# Patient Record
Sex: MALE | Race: WHITE | Employment: OTHER | URBAN - METROPOLITAN AREA
[De-identification: names, ages, dates, MRNs, and addresses within clinical notes are randomized per-mention and may not be internally consistent; named-entity substitution may affect disease eponyms.]

---

## 2018-03-26 ENCOUNTER — OFFICE VISIT (OUTPATIENT)
Dept: FAMILY MEDICINE CLINIC | Age: 68
End: 2018-03-26
Payer: MEDICARE

## 2018-03-26 ENCOUNTER — HOSPITAL ENCOUNTER (OUTPATIENT)
Age: 68
Discharge: HOME | End: 2018-03-28
Payer: MEDICARE

## 2018-03-26 VITALS
DIASTOLIC BLOOD PRESSURE: 84 MMHG | WEIGHT: 219 LBS | HEART RATE: 100 BPM | BODY MASS INDEX: 35.2 KG/M2 | RESPIRATION RATE: 18 BRPM | OXYGEN SATURATION: 97 % | TEMPERATURE: 97.9 F | HEIGHT: 66 IN | SYSTOLIC BLOOD PRESSURE: 120 MMHG

## 2018-03-26 DIAGNOSIS — Z12.5 PROSTATE CANCER SCREENING: ICD-10-CM

## 2018-03-26 DIAGNOSIS — E78.5 HYPERLIPIDEMIA, UNSPECIFIED HYPERLIPIDEMIA TYPE: ICD-10-CM

## 2018-03-26 DIAGNOSIS — I10 ESSENTIAL HYPERTENSION: ICD-10-CM

## 2018-03-26 DIAGNOSIS — Z12.11 COLON CANCER SCREENING: ICD-10-CM

## 2018-03-26 DIAGNOSIS — E78.5 HYPERLIPIDEMIA, UNSPECIFIED HYPERLIPIDEMIA TYPE: Primary | ICD-10-CM

## 2018-03-26 LAB
ALBUMIN SERPL-MCNC: 3.9 G/DL (ref 3.5–5.2)
ALP BLD-CCNC: 54 U/L (ref 40–129)
ALT SERPL-CCNC: 17 U/L (ref 0–40)
ANION GAP SERPL CALCULATED.3IONS-SCNC: 15 MMOL/L (ref 7–16)
AST SERPL-CCNC: 18 U/L (ref 0–39)
BASOPHILS ABSOLUTE: 0.03 E9/L (ref 0–0.2)
BASOPHILS RELATIVE PERCENT: 0.6 % (ref 0–2)
BILIRUB SERPL-MCNC: 2 MG/DL (ref 0–1.2)
BUN BLDV-MCNC: 20 MG/DL (ref 8–23)
CALCIUM SERPL-MCNC: 9 MG/DL (ref 8.6–10.2)
CHLORIDE BLD-SCNC: 102 MMOL/L (ref 98–107)
CHOLESTEROL, TOTAL: 156 MG/DL (ref 0–199)
CO2: 24 MMOL/L (ref 22–29)
CREAT SERPL-MCNC: 1.1 MG/DL (ref 0.7–1.2)
EOSINOPHILS ABSOLUTE: 0.1 E9/L (ref 0.05–0.5)
EOSINOPHILS RELATIVE PERCENT: 2 % (ref 0–6)
GFR AFRICAN AMERICAN: >60
GFR NON-AFRICAN AMERICAN: >60 ML/MIN/1.73
GLUCOSE BLD-MCNC: 123 MG/DL (ref 74–109)
HCT VFR BLD CALC: 42.5 % (ref 37–54)
HDLC SERPL-MCNC: 52 MG/DL
HEMOGLOBIN: 13.3 G/DL (ref 12.5–16.5)
IMMATURE GRANULOCYTES #: 0.01 E9/L
IMMATURE GRANULOCYTES %: 0.2 % (ref 0–5)
LDL CHOLESTEROL CALCULATED: 81 MG/DL (ref 0–99)
LYMPHOCYTES ABSOLUTE: 1.44 E9/L (ref 1.5–4)
LYMPHOCYTES RELATIVE PERCENT: 29.2 % (ref 20–42)
MCH RBC QN AUTO: 25.8 PG (ref 26–35)
MCHC RBC AUTO-ENTMCNC: 31.3 % (ref 32–34.5)
MCV RBC AUTO: 82.4 FL (ref 80–99.9)
MONOCYTES ABSOLUTE: 0.4 E9/L (ref 0.1–0.95)
MONOCYTES RELATIVE PERCENT: 8.1 % (ref 2–12)
NEUTROPHILS ABSOLUTE: 2.95 E9/L (ref 1.8–7.3)
NEUTROPHILS RELATIVE PERCENT: 59.9 % (ref 43–80)
PDW BLD-RTO: 14.3 FL (ref 11.5–15)
PLATELET # BLD: 185 E9/L (ref 130–450)
PMV BLD AUTO: 10.8 FL (ref 7–12)
POTASSIUM SERPL-SCNC: 4.5 MMOL/L (ref 3.5–5)
PROSTATE SPECIFIC ANTIGEN: 1.58 NG/ML (ref 0–4)
RBC # BLD: 5.16 E12/L (ref 3.8–5.8)
SODIUM BLD-SCNC: 141 MMOL/L (ref 132–146)
TOTAL PROTEIN: 6.8 G/DL (ref 6.4–8.3)
TRIGL SERPL-MCNC: 115 MG/DL (ref 0–149)
VLDLC SERPL CALC-MCNC: 23 MG/DL
WBC # BLD: 4.9 E9/L (ref 4.5–11.5)

## 2018-03-26 PROCEDURE — 85025 COMPLETE CBC W/AUTO DIFF WBC: CPT

## 2018-03-26 PROCEDURE — 36415 COLL VENOUS BLD VENIPUNCTURE: CPT | Performed by: FAMILY MEDICINE

## 2018-03-26 PROCEDURE — G8417 CALC BMI ABV UP PARAM F/U: HCPCS | Performed by: FAMILY MEDICINE

## 2018-03-26 PROCEDURE — G8427 DOCREV CUR MEDS BY ELIG CLIN: HCPCS | Performed by: FAMILY MEDICINE

## 2018-03-26 PROCEDURE — 4040F PNEUMOC VAC/ADMIN/RCVD: CPT | Performed by: FAMILY MEDICINE

## 2018-03-26 PROCEDURE — 1036F TOBACCO NON-USER: CPT | Performed by: FAMILY MEDICINE

## 2018-03-26 PROCEDURE — 80053 COMPREHEN METABOLIC PANEL: CPT

## 2018-03-26 PROCEDURE — 3017F COLORECTAL CA SCREEN DOC REV: CPT | Performed by: FAMILY MEDICINE

## 2018-03-26 PROCEDURE — 1123F ACP DISCUSS/DSCN MKR DOCD: CPT | Performed by: FAMILY MEDICINE

## 2018-03-26 PROCEDURE — G8482 FLU IMMUNIZE ORDER/ADMIN: HCPCS | Performed by: FAMILY MEDICINE

## 2018-03-26 PROCEDURE — 99213 OFFICE O/P EST LOW 20 MIN: CPT | Performed by: FAMILY MEDICINE

## 2018-03-26 PROCEDURE — G0103 PSA SCREENING: HCPCS

## 2018-03-26 PROCEDURE — 80061 LIPID PANEL: CPT

## 2018-03-26 ASSESSMENT — ENCOUNTER SYMPTOMS
DIARRHEA: 0
ALLERGIC/IMMUNOLOGIC NEGATIVE: 1
SHORTNESS OF BREATH: 0
NAUSEA: 0
APNEA: 0
SORE THROAT: 0
SINUS PRESSURE: 0
CHEST TIGHTNESS: 0
ABDOMINAL PAIN: 0
VOMITING: 0
BACK PAIN: 0
FACIAL SWELLING: 0
COUGH: 0
BLOOD IN STOOL: 0
WHEEZING: 0
COLOR CHANGE: 0
PHOTOPHOBIA: 0

## 2018-03-26 NOTE — PROGRESS NOTES
are equal, round, and reactive to light. Neck: Normal range of motion. Neck supple. No JVD present. No thyromegaly present. Cardiovascular: Normal rate, regular rhythm and normal heart sounds. Exam reveals no gallop and no friction rub. No murmur heard. Pulmonary/Chest: Effort normal and breath sounds normal. No respiratory distress. He has no wheezes. Abdominal: Soft. Bowel sounds are normal. He exhibits no distension. There is no tenderness. There is no rebound and no guarding. Musculoskeletal: Normal range of motion. Lymphadenopathy:     He has no cervical adenopathy. Neurological: He is alert and oriented to person, place, and time. He has normal reflexes. No cranial nerve deficit. He exhibits normal muscle tone. Coordination normal.   Skin: Skin is warm and dry. No rash noted. No erythema. Psychiatric: He has a normal mood and affect. His behavior is normal. Judgment normal.   Nursing note and vitals reviewed. ASSESSMENT/PLAN:    Xu Collins was seen today for check-up and hypertension. Diagnoses and all orders for this visit:    Hyperlipidemia, unspecified hyperlipidemia type  -     CBC Auto Differential; Future  -     Comprehensive Metabolic Panel; Future    Essential hypertension  -     Comprehensive Metabolic Panel; Future  -     Lipid Panel; Future    Prostate cancer screening  -     PSA SCREENING;  Future            Darryn Linder DO    3/26/2018  9:24 AM

## 2018-05-14 ENCOUNTER — HOSPITAL ENCOUNTER (EMERGENCY)
Age: 68
Discharge: HOME | End: 2018-05-14
Attending: EMERGENCY MEDICINE
Payer: MEDICARE

## 2018-05-14 VITALS
TEMPERATURE: 97.8 F | RESPIRATION RATE: 16 BRPM | SYSTOLIC BLOOD PRESSURE: 147 MMHG | OXYGEN SATURATION: 97 % | HEART RATE: 79 BPM | BODY MASS INDEX: 34.07 KG/M2 | HEIGHT: 66 IN | DIASTOLIC BLOOD PRESSURE: 98 MMHG | WEIGHT: 212 LBS

## 2018-05-14 DIAGNOSIS — I48.91 NEW ONSET A-FIB (HCC): Primary | ICD-10-CM

## 2018-05-14 LAB
ANION GAP SERPL CALCULATED.3IONS-SCNC: 12 MMOL/L (ref 7–16)
APTT: 30.2 SEC (ref 24.5–35.1)
BASOPHILS ABSOLUTE: 0.03 E9/L (ref 0–0.2)
BASOPHILS RELATIVE PERCENT: 0.4 % (ref 0–2)
BUN BLDV-MCNC: 20 MG/DL (ref 8–23)
CALCIUM SERPL-MCNC: 9.3 MG/DL (ref 8.6–10.2)
CHLORIDE BLD-SCNC: 100 MMOL/L (ref 98–107)
CO2: 27 MMOL/L (ref 22–29)
CREAT SERPL-MCNC: 1 MG/DL (ref 0.7–1.2)
EOSINOPHILS ABSOLUTE: 0.11 E9/L (ref 0.05–0.5)
EOSINOPHILS RELATIVE PERCENT: 1.6 % (ref 0–6)
GFR AFRICAN AMERICAN: >60
GFR NON-AFRICAN AMERICAN: >60 ML/MIN/1.73
GLUCOSE BLD-MCNC: 145 MG/DL (ref 74–109)
HCT VFR BLD CALC: 42.8 % (ref 37–54)
HEMOGLOBIN: 14 G/DL (ref 12.5–16.5)
IMMATURE GRANULOCYTES #: 0.03 E9/L
IMMATURE GRANULOCYTES %: 0.4 % (ref 0–5)
INR BLD: 1.1
LYMPHOCYTES ABSOLUTE: 1.63 E9/L (ref 1.5–4)
LYMPHOCYTES RELATIVE PERCENT: 24.4 % (ref 20–42)
MCH RBC QN AUTO: 27.1 PG (ref 26–35)
MCHC RBC AUTO-ENTMCNC: 32.7 % (ref 32–34.5)
MCV RBC AUTO: 82.9 FL (ref 80–99.9)
MONOCYTES ABSOLUTE: 0.44 E9/L (ref 0.1–0.95)
MONOCYTES RELATIVE PERCENT: 6.6 % (ref 2–12)
NEUTROPHILS ABSOLUTE: 4.43 E9/L (ref 1.8–7.3)
NEUTROPHILS RELATIVE PERCENT: 66.6 % (ref 43–80)
PDW BLD-RTO: 14.8 FL (ref 11.5–15)
PLATELET # BLD: 181 E9/L (ref 130–450)
PMV BLD AUTO: 10.4 FL (ref 7–12)
POTASSIUM REFLEX MAGNESIUM: 4.3 MMOL/L (ref 3.5–5)
PROTHROMBIN TIME: 12.8 SEC (ref 9.3–12.4)
RBC # BLD: 5.16 E12/L (ref 3.8–5.8)
SODIUM BLD-SCNC: 139 MMOL/L (ref 132–146)
TROPONIN: <0.01 NG/ML (ref 0–0.03)
TSH SERPL DL<=0.05 MIU/L-ACNC: 1.55 UIU/ML (ref 0.27–4.2)
WBC # BLD: 6.7 E9/L (ref 4.5–11.5)

## 2018-05-14 PROCEDURE — 99285 EMERGENCY DEPT VISIT HI MDM: CPT

## 2018-05-14 PROCEDURE — 85025 COMPLETE CBC W/AUTO DIFF WBC: CPT

## 2018-05-14 PROCEDURE — 84443 ASSAY THYROID STIM HORMONE: CPT

## 2018-05-14 PROCEDURE — 85730 THROMBOPLASTIN TIME PARTIAL: CPT

## 2018-05-14 PROCEDURE — 80048 BASIC METABOLIC PNL TOTAL CA: CPT

## 2018-05-14 PROCEDURE — 84484 ASSAY OF TROPONIN QUANT: CPT

## 2018-05-14 PROCEDURE — 85610 PROTHROMBIN TIME: CPT

## 2018-05-15 ENCOUNTER — OFFICE VISIT (OUTPATIENT)
Dept: FAMILY MEDICINE CLINIC | Age: 68
End: 2018-05-15
Payer: MEDICARE

## 2018-05-15 VITALS
SYSTOLIC BLOOD PRESSURE: 120 MMHG | HEIGHT: 66 IN | DIASTOLIC BLOOD PRESSURE: 86 MMHG | TEMPERATURE: 98 F | OXYGEN SATURATION: 97 % | HEART RATE: 91 BPM | BODY MASS INDEX: 35.03 KG/M2 | WEIGHT: 218 LBS | RESPIRATION RATE: 20 BRPM

## 2018-05-15 DIAGNOSIS — I48.91 NEW ONSET A-FIB (HCC): Primary | ICD-10-CM

## 2018-05-15 DIAGNOSIS — I10 ESSENTIAL HYPERTENSION: ICD-10-CM

## 2018-05-15 DIAGNOSIS — E78.5 HYPERLIPIDEMIA, UNSPECIFIED HYPERLIPIDEMIA TYPE: ICD-10-CM

## 2018-05-15 PROCEDURE — G8427 DOCREV CUR MEDS BY ELIG CLIN: HCPCS | Performed by: FAMILY MEDICINE

## 2018-05-15 PROCEDURE — G8417 CALC BMI ABV UP PARAM F/U: HCPCS | Performed by: FAMILY MEDICINE

## 2018-05-15 PROCEDURE — 99214 OFFICE O/P EST MOD 30 MIN: CPT | Performed by: FAMILY MEDICINE

## 2018-05-15 PROCEDURE — 1036F TOBACCO NON-USER: CPT | Performed by: FAMILY MEDICINE

## 2018-05-15 PROCEDURE — 3017F COLORECTAL CA SCREEN DOC REV: CPT | Performed by: FAMILY MEDICINE

## 2018-05-15 PROCEDURE — 4040F PNEUMOC VAC/ADMIN/RCVD: CPT | Performed by: FAMILY MEDICINE

## 2018-05-15 PROCEDURE — 1123F ACP DISCUSS/DSCN MKR DOCD: CPT | Performed by: FAMILY MEDICINE

## 2018-05-15 ASSESSMENT — ENCOUNTER SYMPTOMS
FACIAL SWELLING: 0
BACK PAIN: 0
SHORTNESS OF BREATH: 0
CHEST TIGHTNESS: 0
NAUSEA: 0
SORE THROAT: 0
ALLERGIC/IMMUNOLOGIC NEGATIVE: 1
SINUS PRESSURE: 0
ABDOMINAL PAIN: 0
DIARRHEA: 0
APNEA: 0
COLOR CHANGE: 0
PHOTOPHOBIA: 0
WHEEZING: 0
COUGH: 0
VOMITING: 0
BLOOD IN STOOL: 0

## 2018-05-16 LAB
EKG ATRIAL RATE: 125 BPM
EKG Q-T INTERVAL: 392 MS
EKG QRS DURATION: 88 MS
EKG QTC CALCULATION (BAZETT): 466 MS
EKG R AXIS: -33 DEGREES
EKG T AXIS: 67 DEGREES
EKG VENTRICULAR RATE: 85 BPM

## 2018-05-19 PROBLEM — I48.19 PERSISTENT ATRIAL FIBRILLATION (HCC): Status: ACTIVE | Noted: 2018-05-19

## 2018-05-22 ENCOUNTER — OFFICE VISIT (OUTPATIENT)
Dept: CARDIOLOGY CLINIC | Age: 68
End: 2018-05-22
Payer: MEDICARE

## 2018-05-22 VITALS
RESPIRATION RATE: 16 BRPM | HEIGHT: 66 IN | DIASTOLIC BLOOD PRESSURE: 82 MMHG | WEIGHT: 217 LBS | HEART RATE: 93 BPM | SYSTOLIC BLOOD PRESSURE: 130 MMHG | BODY MASS INDEX: 34.87 KG/M2

## 2018-05-22 DIAGNOSIS — I48.19 PERSISTENT ATRIAL FIBRILLATION (HCC): ICD-10-CM

## 2018-05-22 PROCEDURE — G8417 CALC BMI ABV UP PARAM F/U: HCPCS | Performed by: INTERNAL MEDICINE

## 2018-05-22 PROCEDURE — 1036F TOBACCO NON-USER: CPT | Performed by: INTERNAL MEDICINE

## 2018-05-22 PROCEDURE — 4040F PNEUMOC VAC/ADMIN/RCVD: CPT | Performed by: INTERNAL MEDICINE

## 2018-05-22 PROCEDURE — 3017F COLORECTAL CA SCREEN DOC REV: CPT | Performed by: INTERNAL MEDICINE

## 2018-05-22 PROCEDURE — 93000 ELECTROCARDIOGRAM COMPLETE: CPT | Performed by: INTERNAL MEDICINE

## 2018-05-22 PROCEDURE — 99215 OFFICE O/P EST HI 40 MIN: CPT | Performed by: INTERNAL MEDICINE

## 2018-05-22 PROCEDURE — G8427 DOCREV CUR MEDS BY ELIG CLIN: HCPCS | Performed by: INTERNAL MEDICINE

## 2018-05-22 PROCEDURE — 1123F ACP DISCUSS/DSCN MKR DOCD: CPT | Performed by: INTERNAL MEDICINE

## 2018-05-22 RX ORDER — OFLOXACIN 3 MG/ML
SOLUTION/ DROPS OPHTHALMIC
Refills: 0 | COMMUNITY
Start: 2018-04-06 | End: 2018-05-30 | Stop reason: ALTCHOICE

## 2018-05-22 RX ORDER — KETOROLAC TROMETHAMINE 5 MG/ML
2 SOLUTION OPHTHALMIC 2 TIMES DAILY
Refills: 0 | COMMUNITY
Start: 2018-04-06 | End: 2018-10-22 | Stop reason: ALTCHOICE

## 2018-05-22 RX ORDER — PREDNISOLONE ACETATE 10 MG/ML
1 SUSPENSION/ DROPS OPHTHALMIC 2 TIMES DAILY
Refills: 0 | COMMUNITY
Start: 2018-04-06 | End: 2018-10-22 | Stop reason: ALTCHOICE

## 2018-05-30 ENCOUNTER — ANESTHESIA (OUTPATIENT)
Dept: CARDIAC CATH/INVASIVE PROCEDURES | Age: 68
End: 2018-05-30

## 2018-05-30 ENCOUNTER — HOSPITAL ENCOUNTER (OUTPATIENT)
Dept: CARDIAC CATH/INVASIVE PROCEDURES | Age: 68
Discharge: HOME | End: 2018-05-30
Payer: MEDICARE

## 2018-05-30 ENCOUNTER — ANESTHESIA EVENT (OUTPATIENT)
Dept: CARDIAC CATH/INVASIVE PROCEDURES | Age: 68
End: 2018-05-30

## 2018-05-30 VITALS
OXYGEN SATURATION: 97 % | WEIGHT: 215 LBS | DIASTOLIC BLOOD PRESSURE: 101 MMHG | HEIGHT: 66 IN | BODY MASS INDEX: 34.55 KG/M2 | SYSTOLIC BLOOD PRESSURE: 148 MMHG | TEMPERATURE: 97.7 F | HEART RATE: 85 BPM | RESPIRATION RATE: 16 BRPM

## 2018-05-30 VITALS
OXYGEN SATURATION: 94 % | DIASTOLIC BLOOD PRESSURE: 81 MMHG | RESPIRATION RATE: 7 BRPM | SYSTOLIC BLOOD PRESSURE: 142 MMHG

## 2018-05-30 PROCEDURE — 3700000000 HC ANESTHESIA ATTENDED CARE

## 2018-05-30 PROCEDURE — 93312 ECHO TRANSESOPHAGEAL: CPT

## 2018-05-30 PROCEDURE — 2709999900 HC NON-CHARGEABLE SUPPLY

## 2018-05-30 PROCEDURE — 92960 CARDIOVERSION ELECTRIC EXT: CPT | Performed by: INTERNAL MEDICINE

## 2018-05-30 PROCEDURE — 93325 DOPPLER ECHO COLOR FLOW MAPG: CPT

## 2018-05-30 PROCEDURE — 2580000003 HC RX 258: Performed by: NURSE ANESTHETIST, CERTIFIED REGISTERED

## 2018-05-30 PROCEDURE — 6360000002 HC RX W HCPCS: Performed by: NURSE ANESTHETIST, CERTIFIED REGISTERED

## 2018-05-30 PROCEDURE — 2580000003 HC RX 258: Performed by: INTERNAL MEDICINE

## 2018-05-30 RX ORDER — PROPOFOL 10 MG/ML
INJECTION, EMULSION INTRAVENOUS PRN
Status: DISCONTINUED | OUTPATIENT
Start: 2018-05-30 | End: 2018-05-30 | Stop reason: SDUPTHER

## 2018-05-30 RX ORDER — SODIUM CHLORIDE 9 MG/ML
INJECTION, SOLUTION INTRAVENOUS CONTINUOUS PRN
Status: DISCONTINUED | OUTPATIENT
Start: 2018-05-30 | End: 2018-05-30 | Stop reason: SDUPTHER

## 2018-05-30 RX ORDER — SODIUM CHLORIDE 9 MG/ML
INJECTION, SOLUTION INTRAVENOUS ONCE
Status: COMPLETED | OUTPATIENT
Start: 2018-05-30 | End: 2018-05-30

## 2018-05-30 RX ADMIN — SODIUM CHLORIDE: 9 INJECTION, SOLUTION INTRAVENOUS at 08:20

## 2018-05-30 RX ADMIN — SODIUM CHLORIDE: 9 INJECTION, SOLUTION INTRAVENOUS at 09:23

## 2018-05-30 RX ADMIN — PROPOFOL 130 MG: 10 INJECTION, EMULSION INTRAVENOUS at 09:35

## 2018-07-28 PROBLEM — I35.9 AORTIC VALVE DISEASE: Status: ACTIVE | Noted: 2018-07-28

## 2018-08-02 ENCOUNTER — OFFICE VISIT (OUTPATIENT)
Dept: CARDIOLOGY CLINIC | Age: 68
End: 2018-08-02
Payer: MEDICARE

## 2018-08-02 VITALS
WEIGHT: 214 LBS | HEART RATE: 68 BPM | RESPIRATION RATE: 14 BRPM | HEIGHT: 66 IN | SYSTOLIC BLOOD PRESSURE: 134 MMHG | BODY MASS INDEX: 34.39 KG/M2 | DIASTOLIC BLOOD PRESSURE: 82 MMHG

## 2018-08-02 DIAGNOSIS — I35.9 AORTIC VALVE DISEASE: ICD-10-CM

## 2018-08-02 DIAGNOSIS — I48.19 PERSISTENT ATRIAL FIBRILLATION (HCC): ICD-10-CM

## 2018-08-02 PROCEDURE — G8417 CALC BMI ABV UP PARAM F/U: HCPCS | Performed by: INTERNAL MEDICINE

## 2018-08-02 PROCEDURE — 99214 OFFICE O/P EST MOD 30 MIN: CPT | Performed by: INTERNAL MEDICINE

## 2018-08-02 PROCEDURE — 93000 ELECTROCARDIOGRAM COMPLETE: CPT | Performed by: INTERNAL MEDICINE

## 2018-08-02 PROCEDURE — 1036F TOBACCO NON-USER: CPT | Performed by: INTERNAL MEDICINE

## 2018-08-02 PROCEDURE — 1123F ACP DISCUSS/DSCN MKR DOCD: CPT | Performed by: INTERNAL MEDICINE

## 2018-08-02 PROCEDURE — 3017F COLORECTAL CA SCREEN DOC REV: CPT | Performed by: INTERNAL MEDICINE

## 2018-08-02 PROCEDURE — 4040F PNEUMOC VAC/ADMIN/RCVD: CPT | Performed by: INTERNAL MEDICINE

## 2018-08-02 PROCEDURE — 1101F PT FALLS ASSESS-DOCD LE1/YR: CPT | Performed by: INTERNAL MEDICINE

## 2018-08-02 PROCEDURE — G8427 DOCREV CUR MEDS BY ELIG CLIN: HCPCS | Performed by: INTERNAL MEDICINE

## 2018-08-02 NOTE — PROGRESS NOTES
Chief Complaint   Patient presents with    Atrial Fibrillation       Patient Active Problem List    Diagnosis Date Noted    Aortic valve disease 07/28/2018     Overview Note:     A. DORINDA 5/30/2018: trileaflet valve, moderate AI, mild MR,  Grossly normal EF      Persistent atrial fibrillation ([address] 05/19/2018     Overview Note:     A .  DORINDA guided DC cardioversion 5/30/2018      Hypertension 07/23/2015    Hyperlipidemia 07/23/2015       Current Outpatient Prescriptions   Medication Sig Dispense Refill    ACETAMINOPHEN PO Take 650 mg by mouth      apixaban (ELIQUIS) 5 MG TABS tablet Take 1 tablet by mouth 2 times daily 90 tablet 3    ramipril (ALTACE) 10 MG capsule TAKE 1 CAPSULE TWICE DAILY 180 capsule 3    atorvastatin (LIPITOR) 40 MG tablet TAKE 1 TABLET ONE TIME DAILY 90 tablet 3    metoprolol tartrate (LOPRESSOR) 25 MG tablet TAKE 1 TABLET TWICE DAILY 180 tablet 3    amLODIPine (NORVASC) 5 MG tablet TAKE 1 TABLET ONE TIME DAILY 90 tablet 3    pantoprazole (PROTONIX) 40 MG tablet TAKE 1 TABLET ONE TIME DAILY 90 tablet 3    cetirizine (ZYRTEC) 5 MG tablet Take 5 mg by mouth daily.  ketorolac (ACULAR) 0.5 % ophthalmic solution Place 2 drops into the left eye 2 times daily   0    prednisoLONE acetate (PRED FORTE) 1 % ophthalmic suspension Place 1 drop into the left eye 2 times daily   0     No current facility-administered medications for this visit.          Allergies   Allergen Reactions    Pcn [Penicillins] Rash       Vitals:    08/02/18 0713   BP: 134/82   Pulse: 68   Resp: 14   Weight: 214 lb (97.1 kg)   Height: 5' 6\" (1.676 m)       Social History     Social History    Marital status:      Spouse name: N/A    Number of children: N/A    Years of education: N/A     Occupational History     [employer]     Social History Main Topics    Smoking status: Former Smoker     Quit date: 5/30/1978    Smokeless tobacco: Never Used    Alcohol use Yes     3 Glasses of wine per week      Comment: gallop and no friction rub. No murmur heard. Pulmonary/Chest: Effort normal and breath sounds normal. No respiratory distress. No wheezes. No rales. No tenderness. Abdominal: Soft. Bowel sounds are normal. No distension and no mass. No tenderness. No rebound and no guarding. Musculoskeletal: Normal range of motion. No edema and no tenderness. Lymphadenopathy:   No cervical adenopathy. No groin adenopathy. Neurological: Alert and oriented to person, place, and time. Skin: Skin is warm and dry. No rash noted. Not diaphoretic. No erythema. Psychiatric: Normal mood and affect. Behavior is normal.     EKG:  Nsr, normal     ASSESSMENT AND PLAN:  Patient Active Problem List   Diagnosis    Hypertension: at target on BB and Norvasc    Hyperlipidemia: on high intensity statin     Persistent atrial fibrillation : DC cardioversion successful. Continue NOAC and BB. Discussed walking program, need to continue with light alcohol use, sleep studies if PCP feels appropriate    Aortic valve disease: moderate by DORINDA.    Will follow     OV 3 months    Margot Bates M.D  [facility]

## 2018-09-04 ENCOUNTER — OFFICE VISIT (OUTPATIENT)
Dept: FAMILY MEDICINE CLINIC | Age: 68
End: 2018-09-04
Payer: MEDICARE

## 2018-09-04 VITALS
TEMPERATURE: 98.6 F | WEIGHT: 216 LBS | BODY MASS INDEX: 34.72 KG/M2 | SYSTOLIC BLOOD PRESSURE: 126 MMHG | DIASTOLIC BLOOD PRESSURE: 70 MMHG | OXYGEN SATURATION: 96 % | HEART RATE: 50 BPM | HEIGHT: 66 IN | RESPIRATION RATE: 16 BRPM

## 2018-09-04 DIAGNOSIS — J20.9 ACUTE BRONCHITIS, UNSPECIFIED ORGANISM: Primary | ICD-10-CM

## 2018-09-04 PROCEDURE — 3017F COLORECTAL CA SCREEN DOC REV: CPT | Performed by: FAMILY MEDICINE

## 2018-09-04 PROCEDURE — 1036F TOBACCO NON-USER: CPT | Performed by: FAMILY MEDICINE

## 2018-09-04 PROCEDURE — 1123F ACP DISCUSS/DSCN MKR DOCD: CPT | Performed by: FAMILY MEDICINE

## 2018-09-04 PROCEDURE — 1101F PT FALLS ASSESS-DOCD LE1/YR: CPT | Performed by: FAMILY MEDICINE

## 2018-09-04 PROCEDURE — 4040F PNEUMOC VAC/ADMIN/RCVD: CPT | Performed by: FAMILY MEDICINE

## 2018-09-04 PROCEDURE — G8427 DOCREV CUR MEDS BY ELIG CLIN: HCPCS | Performed by: FAMILY MEDICINE

## 2018-09-04 PROCEDURE — G8417 CALC BMI ABV UP PARAM F/U: HCPCS | Performed by: FAMILY MEDICINE

## 2018-09-04 PROCEDURE — 99213 OFFICE O/P EST LOW 20 MIN: CPT | Performed by: FAMILY MEDICINE

## 2018-09-04 RX ORDER — DOXYCYCLINE HYCLATE 100 MG
100 TABLET ORAL 2 TIMES DAILY
Qty: 20 TABLET | Refills: 0 | Status: SHIPPED | OUTPATIENT
Start: 2018-09-04 | End: 2018-09-14

## 2018-09-04 RX ORDER — PREDNISONE 20 MG/1
TABLET ORAL
Qty: 11 TABLET | Refills: 0 | Status: SHIPPED | OUTPATIENT
Start: 2018-09-04 | End: 2018-09-26

## 2018-09-04 ASSESSMENT — ENCOUNTER SYMPTOMS
SHORTNESS OF BREATH: 0
BACK PAIN: 0
DIARRHEA: 0
RHINORRHEA: 1
BLOOD IN STOOL: 0
ABDOMINAL PAIN: 0
COLOR CHANGE: 0
ALLERGIC/IMMUNOLOGIC NEGATIVE: 1
SORE THROAT: 0
SINUS PRESSURE: 0
APNEA: 0
COUGH: 1
FACIAL SWELLING: 0
NAUSEA: 0
VOMITING: 0
PHOTOPHOBIA: 0
WHEEZING: 1
CHEST TIGHTNESS: 0

## 2018-09-04 NOTE — PROGRESS NOTES
Chief Complaint:   Chief Complaint   Patient presents with    Wheezing    Cough     x1 day    Drainage       Wheezing    This is a new problem. The current episode started in the past 7 days. The problem occurs intermittently. The problem has been gradually worsening. Associated symptoms include coughing and rhinorrhea. Pertinent negatives include no abdominal pain, chest pain, diarrhea, headaches, rash, shortness of breath, sore throat or vomiting. The treatment provided no relief. Cough   This is a new problem. The current episode started in the past 7 days. The problem has been gradually worsening. The problem occurs constantly. The cough is non-productive. Associated symptoms include rhinorrhea and wheezing. Pertinent negatives include no chest pain, headaches, myalgias, rash, sore throat or shortness of breath. The treatment provided mild relief. Patient Active Problem List   Diagnosis    Hypertension    Hyperlipidemia    Persistent atrial fibrillation (HCC)    Aortic valve disease       Past Medical History:   Diagnosis Date    Acid reflux     Heart murmur     Hyperlipidemia     Hypertension        Past Surgical History:   Procedure Laterality Date    CARDIOVERSION  05/30/2018    EYE SURGERY Left 05/2018    cataracts, retinal tear repair.  HERNIA REPAIR      KIDNEY REMOVAL Right     Half of kidney removed.      KIDNEY SURGERY      TRANSESOPHAGEAL ECHOCARDIOGRAM  05/30/2018       Current Outpatient Prescriptions   Medication Sig Dispense Refill    doxycycline hyclate (VIBRA-TABS) 100 MG tablet Take 1 tablet by mouth 2 times daily for 10 days 20 tablet 0    predniSONE (DELTASONE) 20 MG tablet 2 tabs qd x 3 days  1 tab qd x 3 days 0.5 tab qd x 3 days 11 tablet 0    ACETAMINOPHEN PO Take 650 mg by mouth      apixaban (ELIQUIS) 5 MG TABS tablet Take 1 tablet by mouth 2 times daily 90 tablet 3    ketorolac (ACULAR) 0.5 % ophthalmic solution Place 2 drops into the left eye 2 times frequency and urgency. Musculoskeletal: Negative for arthralgias, back pain, joint swelling and myalgias. Skin: Negative for color change and rash. Allergic/Immunologic: Negative. Neurological: Negative for syncope, weakness, light-headedness and headaches. Hematological: Negative. Psychiatric/Behavioral: Negative for agitation, behavioral problems, confusion and self-injury. The patient is not nervous/anxious. All other systems reviewed and are negative. Physical Exam   Constitutional: He is oriented to person, place, and time. He appears well-developed and well-nourished. No distress. HENT:   Head: Normocephalic and atraumatic. Nose: Nose normal.   Mouth/Throat: Oropharynx is clear and moist.   Eyes: Pupils are equal, round, and reactive to light. Conjunctivae and EOM are normal.   Neck: Normal range of motion. Neck supple. No JVD present. No thyromegaly present. Cardiovascular: Normal rate, regular rhythm and normal heart sounds. Exam reveals no gallop and no friction rub. No murmur heard. Pulmonary/Chest: Effort normal. No respiratory distress. He has wheezes. He has rhonchi. Abdominal: Soft. Bowel sounds are normal. He exhibits no distension. There is no tenderness. There is no rebound and no guarding. Musculoskeletal: Normal range of motion. Lymphadenopathy:     He has no cervical adenopathy. Neurological: He is alert and oriented to person, place, and time. He has normal reflexes. No cranial nerve deficit. He exhibits normal muscle tone. Coordination normal.   Skin: Skin is warm and dry. No rash noted. No erythema. Psychiatric: He has a normal mood and affect. His behavior is normal. Judgment normal.   Nursing note and vitals reviewed. ASSESSMENT/PLAN:    Cheyanne Burrell was seen today for wheezing, cough and drainage.     Diagnoses and all orders for this visit:    Acute bronchitis, unspecified organism  -     doxycycline hyclate (VIBRA-TABS) 100 MG tablet;  Take 1 tablet by mouth 2 times daily for 10 days  -     predniSONE (DELTASONE) 20 MG tablet; 2 tabs qd x 3 days  1 tab qd x 3 days 0.5 tab qd x 3 days            Maggie Willard,     9/4/2018  4:28 PM

## 2018-09-04 NOTE — PROGRESS NOTES
Chief Complaint: No chief complaint on file. HPI    Patient Active Problem List   Diagnosis    Hypertension    Hyperlipidemia    Persistent atrial fibrillation ([address]    Aortic valve disease       Past Medical History:   Diagnosis Date    Acid reflux     Heart murmur     Hyperlipidemia     Hypertension        Past Surgical History:   Procedure Laterality Date    CARDIOVERSION  05/30/2018    EYE SURGERY Left 05/2018    cataracts, retinal tear repair.  HERNIA REPAIR      KIDNEY REMOVAL Right     Half of kidney removed.  KIDNEY SURGERY      TRANSESOPHAGEAL ECHOCARDIOGRAM  05/30/2018       Current Outpatient Prescriptions   Medication Sig Dispense Refill    ACETAMINOPHEN PO Take 650 mg by mouth      apixaban (ELIQUIS) 5 MG TABS tablet Take 1 tablet by mouth 2 times daily 90 tablet 3    ketorolac (ACULAR) 0.5 % ophthalmic solution Place 2 drops into the left eye 2 times daily   0    prednisoLONE acetate (PRED FORTE) 1 % ophthalmic suspension Place 1 drop into the left eye 2 times daily   0    ramipril (ALTACE) 10 MG capsule TAKE 1 CAPSULE TWICE DAILY 180 capsule 3    atorvastatin (LIPITOR) 40 MG tablet TAKE 1 TABLET ONE TIME DAILY 90 tablet 3    metoprolol tartrate (LOPRESSOR) 25 MG tablet TAKE 1 TABLET TWICE DAILY 180 tablet 3    amLODIPine (NORVASC) 5 MG tablet TAKE 1 TABLET ONE TIME DAILY 90 tablet 3    pantoprazole (PROTONIX) 40 MG tablet TAKE 1 TABLET ONE TIME DAILY 90 tablet 3    cetirizine (ZYRTEC) 5 MG tablet Take 5 mg by mouth daily. No current facility-administered medications for this visit.         Allergies   Allergen Reactions    Pcn [Penicillins] Rash       Social History     Social History    Marital status:      Spouse name: N/A    Number of children: N/A    Years of education: N/A     Occupational History     [employer]     Social History Main Topics    Smoking status: Former Smoker     Quit date: 5/30/1978    Smokeless tobacco: Never Used    Alcohol use Yes     3 Glasses of wine per week      Comment: occasionally    Drug use: No    Sexual activity: Not on file     Other Topics Concern    Not on file     Social History Narrative    No narrative on file       Family History   Problem Relation Age of Onset    High Blood Pressure Father     Diabetes Brother          Review of Systems    Physical Exam                          ASSESSMENT/PLAN:    There are no diagnoses linked to this encounter.         Ben Stoddard DO    9/4/2018  4:23 PM

## 2018-09-10 RX ORDER — PANTOPRAZOLE SODIUM 40 MG/1
TABLET, DELAYED RELEASE ORAL
Qty: 90 TABLET | Refills: 3 | Status: SHIPPED | OUTPATIENT
Start: 2018-09-10 | End: 2019-02-15 | Stop reason: SDUPTHER

## 2018-09-10 RX ORDER — AMLODIPINE BESYLATE 5 MG/1
TABLET ORAL
Qty: 90 TABLET | Refills: 3 | Status: SHIPPED | OUTPATIENT
Start: 2018-09-10 | End: 2019-02-15 | Stop reason: SDUPTHER

## 2018-09-10 RX ORDER — ATORVASTATIN CALCIUM 40 MG/1
TABLET, FILM COATED ORAL
Qty: 90 TABLET | Refills: 3 | Status: SHIPPED | OUTPATIENT
Start: 2018-09-10 | End: 2019-06-02 | Stop reason: SDUPTHER

## 2018-09-10 RX ORDER — RAMIPRIL 10 MG/1
CAPSULE ORAL
Qty: 180 CAPSULE | Refills: 3 | Status: SHIPPED | OUTPATIENT
Start: 2018-09-10 | End: 2019-03-24 | Stop reason: SDUPTHER

## 2018-09-26 ENCOUNTER — OFFICE VISIT (OUTPATIENT)
Dept: FAMILY MEDICINE CLINIC | Age: 68
End: 2018-09-26
Payer: MEDICARE

## 2018-09-26 VITALS
SYSTOLIC BLOOD PRESSURE: 124 MMHG | BODY MASS INDEX: 34.55 KG/M2 | HEART RATE: 70 BPM | RESPIRATION RATE: 18 BRPM | WEIGHT: 215 LBS | HEIGHT: 66 IN | DIASTOLIC BLOOD PRESSURE: 70 MMHG

## 2018-09-26 DIAGNOSIS — E78.5 HYPERLIPIDEMIA, UNSPECIFIED HYPERLIPIDEMIA TYPE: ICD-10-CM

## 2018-09-26 DIAGNOSIS — I10 ESSENTIAL HYPERTENSION: Primary | ICD-10-CM

## 2018-09-26 DIAGNOSIS — I48.0 PAF (PAROXYSMAL ATRIAL FIBRILLATION) (HCC): ICD-10-CM

## 2018-09-26 DIAGNOSIS — Z12.11 COLON CANCER SCREENING: ICD-10-CM

## 2018-09-26 DIAGNOSIS — I35.9 AORTIC VALVE DISEASE: ICD-10-CM

## 2018-09-26 DIAGNOSIS — Z23 NEED FOR PROPHYLACTIC VACCINATION AND INOCULATION AGAINST INFLUENZA: ICD-10-CM

## 2018-09-26 DIAGNOSIS — B35.3 TINEA PEDIS OF BOTH FEET: ICD-10-CM

## 2018-09-26 PROCEDURE — 1036F TOBACCO NON-USER: CPT | Performed by: FAMILY MEDICINE

## 2018-09-26 PROCEDURE — 3017F COLORECTAL CA SCREEN DOC REV: CPT | Performed by: FAMILY MEDICINE

## 2018-09-26 PROCEDURE — G8427 DOCREV CUR MEDS BY ELIG CLIN: HCPCS | Performed by: FAMILY MEDICINE

## 2018-09-26 PROCEDURE — 1101F PT FALLS ASSESS-DOCD LE1/YR: CPT | Performed by: FAMILY MEDICINE

## 2018-09-26 PROCEDURE — 90662 IIV NO PRSV INCREASED AG IM: CPT | Performed by: FAMILY MEDICINE

## 2018-09-26 PROCEDURE — G0008 ADMIN INFLUENZA VIRUS VAC: HCPCS | Performed by: FAMILY MEDICINE

## 2018-09-26 PROCEDURE — G8417 CALC BMI ABV UP PARAM F/U: HCPCS | Performed by: FAMILY MEDICINE

## 2018-09-26 PROCEDURE — 1123F ACP DISCUSS/DSCN MKR DOCD: CPT | Performed by: FAMILY MEDICINE

## 2018-09-26 PROCEDURE — 4040F PNEUMOC VAC/ADMIN/RCVD: CPT | Performed by: FAMILY MEDICINE

## 2018-09-26 PROCEDURE — 99214 OFFICE O/P EST MOD 30 MIN: CPT | Performed by: FAMILY MEDICINE

## 2018-09-26 RX ORDER — CLOTRIMAZOLE 1 %
CREAM (GRAM) TOPICAL
Qty: 60 G | Refills: 1 | Status: SHIPPED | OUTPATIENT
Start: 2018-09-26 | End: 2018-10-03

## 2018-09-26 ASSESSMENT — ENCOUNTER SYMPTOMS
BACK PAIN: 0
DIARRHEA: 0
HEARTBURN: 1
COUGH: 0
COLOR CHANGE: 0
APNEA: 0
VOMITING: 0
ABDOMINAL PAIN: 0
WHEEZING: 0
ALLERGIC/IMMUNOLOGIC NEGATIVE: 1
NAUSEA: 0
CHEST TIGHTNESS: 0
FACIAL SWELLING: 0
BLOOD IN STOOL: 0
SORE THROAT: 0
SINUS PRESSURE: 0
PHOTOPHOBIA: 0
SHORTNESS OF BREATH: 0

## 2018-09-26 NOTE — PROGRESS NOTES
Vaccine Information Sheet, \"Influenza - Inactivated\"  given to [employer], or parent/legal guardian of  [employer] and verbalized understanding. Patient responses:    Have you ever had a reaction to a flu vaccine? No  Are you able to eat eggs without adverse effects? Yes  Do you have any current illness? No  Have you ever had Guillian [place] Syndrome? No    Flu vaccine given per order. Please see immunization tab.
HENT: Negative for congestion, facial swelling, hearing loss, nosebleeds, sinus pressure and sore throat. Eyes: Negative for photophobia and visual disturbance. Respiratory: Negative for apnea, cough, chest tightness, shortness of breath and wheezing. Cardiovascular: Negative for chest pain, palpitations and leg swelling. Gastrointestinal: Positive for heartburn. Negative for abdominal pain, blood in stool, diarrhea, nausea and vomiting. Genitourinary: Negative for difficulty urinating, frequency and urgency. Musculoskeletal: Negative for arthralgias, back pain, joint swelling and myalgias. Skin: Positive for rash. Negative for color change. Allergic/Immunologic: Negative. Neurological: Negative for syncope, weakness, light-headedness and headaches. Hematological: Negative. Psychiatric/Behavioral: Negative for agitation, behavioral problems, confusion and self-injury. The patient is not nervous/anxious. All other systems reviewed and are negative. Physical Exam   Constitutional: He is oriented to person, place, and time. He appears well-developed and well-nourished. No distress. HENT:   Head: Normocephalic and atraumatic. Nose: Nose normal.   Mouth/Throat: Oropharynx is clear and moist.   Eyes: Pupils are equal, round, and reactive to light. Conjunctivae and EOM are normal.   Neck: Normal range of motion. Neck supple. No JVD present. No thyromegaly present. Cardiovascular: Normal rate and regular rhythm. Exam reveals no gallop and no friction rub. Murmur heard. Pulmonary/Chest: Effort normal and breath sounds normal. No respiratory distress. He has no wheezes. Abdominal: Soft. Bowel sounds are normal. He exhibits no distension. There is no tenderness. There is no rebound and no guarding. Musculoskeletal: Normal range of motion. Lymphadenopathy:     He has no cervical adenopathy. Neurological: He is alert and oriented to person, place, and time.  He has normal

## 2018-10-14 ENCOUNTER — HOSPITAL ENCOUNTER (OUTPATIENT)
Age: 68
Discharge: HOME | End: 2018-10-16
Payer: MEDICARE

## 2018-10-14 ENCOUNTER — HOSPITAL ENCOUNTER (OUTPATIENT)
Dept: GENERAL RADIOLOGY | Age: 68
Discharge: HOME | End: 2018-10-16
Payer: MEDICARE

## 2018-10-14 DIAGNOSIS — N20.0 CALCULUS OF KIDNEY: ICD-10-CM

## 2018-10-14 PROCEDURE — 74018 RADEX ABDOMEN 1 VIEW: CPT

## 2018-10-15 ENCOUNTER — HOSPITAL ENCOUNTER (OUTPATIENT)
Age: 68
Discharge: HOME | End: 2018-10-17
Payer: MEDICARE

## 2018-10-15 ENCOUNTER — OFFICE VISIT (OUTPATIENT)
Dept: FAMILY MEDICINE CLINIC | Age: 68
End: 2018-10-15
Payer: MEDICARE

## 2018-10-15 ENCOUNTER — HOSPITAL ENCOUNTER (OUTPATIENT)
Dept: CT IMAGING | Age: 68
Discharge: HOME | End: 2018-10-17
Payer: MEDICARE

## 2018-10-15 VITALS
TEMPERATURE: 98.2 F | WEIGHT: 216 LBS | DIASTOLIC BLOOD PRESSURE: 80 MMHG | BODY MASS INDEX: 34.72 KG/M2 | SYSTOLIC BLOOD PRESSURE: 114 MMHG | OXYGEN SATURATION: 94 % | HEART RATE: 97 BPM | HEIGHT: 66 IN | RESPIRATION RATE: 18 BRPM

## 2018-10-15 DIAGNOSIS — N20.0 KIDNEY STONE: ICD-10-CM

## 2018-10-15 DIAGNOSIS — Q62.11 HYDRONEPHROSIS WITH URETEROPELVIC JUNCTION (UPJ) OBSTRUCTION: ICD-10-CM

## 2018-10-15 DIAGNOSIS — N20.0 KIDNEY STONE: Primary | ICD-10-CM

## 2018-10-15 DIAGNOSIS — R10.9 ACUTE RIGHT FLANK PAIN: ICD-10-CM

## 2018-10-15 DIAGNOSIS — R30.0 DYSURIA: ICD-10-CM

## 2018-10-15 LAB
ANION GAP SERPL CALCULATED.3IONS-SCNC: 15 MMOL/L (ref 7–16)
BASOPHILS ABSOLUTE: 0.03 E9/L (ref 0–0.2)
BASOPHILS RELATIVE PERCENT: 0.3 % (ref 0–2)
BILIRUBIN, POC: ABNORMAL
BLOOD URINE, POC: ABNORMAL
BUN BLDV-MCNC: 23 MG/DL (ref 8–23)
CALCIUM SERPL-MCNC: 9.2 MG/DL (ref 8.6–10.2)
CHLORIDE BLD-SCNC: 100 MMOL/L (ref 98–107)
CLARITY, POC: CLEAR
CO2: 25 MMOL/L (ref 22–29)
COLOR, POC: ABNORMAL
CREAT SERPL-MCNC: 1.6 MG/DL (ref 0.7–1.2)
EOSINOPHILS ABSOLUTE: 0.05 E9/L (ref 0.05–0.5)
EOSINOPHILS RELATIVE PERCENT: 0.5 % (ref 0–6)
GFR AFRICAN AMERICAN: 52
GFR NON-AFRICAN AMERICAN: 43 ML/MIN/1.73
GLUCOSE BLD-MCNC: 87 MG/DL (ref 74–109)
GLUCOSE URINE, POC: ABNORMAL
HCT VFR BLD CALC: 42.3 % (ref 37–54)
HEMOGLOBIN: 13.3 G/DL (ref 12.5–16.5)
IMMATURE GRANULOCYTES #: 0.05 E9/L
IMMATURE GRANULOCYTES %: 0.5 % (ref 0–5)
KETONES, POC: ABNORMAL
LEUKOCYTE EST, POC: ABNORMAL
LYMPHOCYTES ABSOLUTE: 2.01 E9/L (ref 1.5–4)
LYMPHOCYTES RELATIVE PERCENT: 18.7 % (ref 20–42)
MCH RBC QN AUTO: 27.1 PG (ref 26–35)
MCHC RBC AUTO-ENTMCNC: 31.4 % (ref 32–34.5)
MCV RBC AUTO: 86.2 FL (ref 80–99.9)
MONOCYTES ABSOLUTE: 1.46 E9/L (ref 0.1–0.95)
MONOCYTES RELATIVE PERCENT: 13.6 % (ref 2–12)
NEUTROPHILS ABSOLUTE: 7.16 E9/L (ref 1.8–7.3)
NEUTROPHILS RELATIVE PERCENT: 66.4 % (ref 43–80)
NITRITE, POC: ABNORMAL
PDW BLD-RTO: 14.2 FL (ref 11.5–15)
PH, POC: 6
PLATELET # BLD: 209 E9/L (ref 130–450)
PMV BLD AUTO: 11.1 FL (ref 7–12)
POTASSIUM SERPL-SCNC: 4.2 MMOL/L (ref 3.5–5)
PROTEIN, POC: ABNORMAL
RBC # BLD: 4.91 E12/L (ref 3.8–5.8)
SODIUM BLD-SCNC: 140 MMOL/L (ref 132–146)
SPECIFIC GRAVITY, POC: 1
UROBILINOGEN, POC: ABNORMAL
WBC # BLD: 10.8 E9/L (ref 4.5–11.5)

## 2018-10-15 PROCEDURE — 85025 COMPLETE CBC W/AUTO DIFF WBC: CPT

## 2018-10-15 PROCEDURE — 74176 CT ABD & PELVIS W/O CONTRAST: CPT

## 2018-10-15 PROCEDURE — 1123F ACP DISCUSS/DSCN MKR DOCD: CPT | Performed by: FAMILY MEDICINE

## 2018-10-15 PROCEDURE — 1036F TOBACCO NON-USER: CPT | Performed by: FAMILY MEDICINE

## 2018-10-15 PROCEDURE — 3017F COLORECTAL CA SCREEN DOC REV: CPT | Performed by: FAMILY MEDICINE

## 2018-10-15 PROCEDURE — G8417 CALC BMI ABV UP PARAM F/U: HCPCS | Performed by: FAMILY MEDICINE

## 2018-10-15 PROCEDURE — 81002 URINALYSIS NONAUTO W/O SCOPE: CPT | Performed by: FAMILY MEDICINE

## 2018-10-15 PROCEDURE — 87088 URINE BACTERIA CULTURE: CPT

## 2018-10-15 PROCEDURE — G8482 FLU IMMUNIZE ORDER/ADMIN: HCPCS | Performed by: FAMILY MEDICINE

## 2018-10-15 PROCEDURE — 80048 BASIC METABOLIC PNL TOTAL CA: CPT

## 2018-10-15 PROCEDURE — 99214 OFFICE O/P EST MOD 30 MIN: CPT | Performed by: FAMILY MEDICINE

## 2018-10-15 PROCEDURE — 1101F PT FALLS ASSESS-DOCD LE1/YR: CPT | Performed by: FAMILY MEDICINE

## 2018-10-15 PROCEDURE — G8427 DOCREV CUR MEDS BY ELIG CLIN: HCPCS | Performed by: FAMILY MEDICINE

## 2018-10-15 PROCEDURE — 4040F PNEUMOC VAC/ADMIN/RCVD: CPT | Performed by: FAMILY MEDICINE

## 2018-10-15 ASSESSMENT — ENCOUNTER SYMPTOMS
ALLERGIC/IMMUNOLOGIC NEGATIVE: 1
COUGH: 0
CHEST TIGHTNESS: 0
APNEA: 0
SINUS PRESSURE: 0
BACK PAIN: 0
WHEEZING: 0
COLOR CHANGE: 0
DIARRHEA: 0
ABDOMINAL PAIN: 0
BLOOD IN STOOL: 0
SORE THROAT: 0
FACIAL SWELLING: 0
PHOTOPHOBIA: 0
NAUSEA: 0
VOMITING: 0
SHORTNESS OF BREATH: 0

## 2018-10-15 NOTE — PROGRESS NOTES
History    Marital status:      Spouse name: N/A    Number of children: N/A    Years of education: N/A     Occupational History     [employer]     Social History Main Topics    Smoking status: Former Smoker     Packs/day: 0.25     Years: 0.50     Quit date: 5/30/1978    Smokeless tobacco: Never Used    Alcohol use Yes     3 Glasses of wine per week      Comment: occasionally    Drug use: No    Sexual activity: Not Asked     Other Topics Concern    None     Social History Narrative    None       Family History   Problem Relation Age of Onset    High Blood Pressure Father     Diabetes Brother          Review of Systems   Constitutional: Negative. HENT: Negative for congestion, facial swelling, hearing loss, nosebleeds, sinus pressure and sore throat. Eyes: Negative for photophobia and visual disturbance. Respiratory: Negative for apnea, cough, chest tightness, shortness of breath and wheezing. Cardiovascular: Negative for chest pain, palpitations and leg swelling. Gastrointestinal: Negative for abdominal pain, blood in stool, diarrhea, nausea and vomiting. Genitourinary: Positive for flank pain. Negative for difficulty urinating, frequency and urgency. Musculoskeletal: Negative for arthralgias, back pain, joint swelling and myalgias. Skin: Negative for color change and rash. Allergic/Immunologic: Negative. Neurological: Negative for syncope, weakness, light-headedness and headaches. Hematological: Negative. Psychiatric/Behavioral: Negative for agitation, behavioral problems, confusion and self-injury. The patient is not nervous/anxious. All other systems reviewed and are negative. Physical Exam   Constitutional: He is oriented to person, place, and time. He appears well-developed and well-nourished. No distress. HENT:   Head: Normocephalic and atraumatic.    Nose: Nose normal.   Mouth/Throat: Oropharynx is clear and moist.   Eyes: Pupils are equal, round, and reactive to light. Conjunctivae and EOM are normal.   Neck: Normal range of motion. Neck supple. No JVD present. No thyromegaly present. Cardiovascular: Normal rate, regular rhythm and normal heart sounds. Exam reveals no gallop and no friction rub. No murmur heard. Pulmonary/Chest: Effort normal and breath sounds normal. No respiratory distress. He has no wheezes. Abdominal: Soft. Bowel sounds are normal. He exhibits no distension. There is no tenderness. There is no rebound and no guarding. Musculoskeletal: Normal range of motion. Lymphadenopathy:     He has no cervical adenopathy. Neurological: He is alert and oriented to person, place, and time. He has normal reflexes. No cranial nerve deficit. He exhibits normal muscle tone. Coordination normal.   Skin: Skin is warm and dry. No rash noted. No erythema. Psychiatric: He has a normal mood and affect. His behavior is normal. Judgment normal.   Nursing note and vitals reviewed. ASSESSMENT/PLAN:    Shadia Murillo was seen today for nephrolithiasis. Diagnoses and all orders for this visit:    Kidney stone  -     POCT Urinalysis no Micro  -     CT ABDOMEN PELVIS WO CONTRAST; Future  -     [facility]- Rupa Red MD ([facility])    Dysuria  -     Urine Culture; Future  -     CT ABDOMEN PELVIS WO CONTRAST; Future  -     [facility]- Rupa Red MD ([facility])  -     CBC Auto Differential; Future  -     BASIC METABOLIC PANEL; Future    Acute right flank pain  -     CT ABDOMEN PELVIS WO CONTRAST; Future  -     [facility]- Rupa Red MD ([facility])    Hydronephrosis with ureteropelvic junction (UPJ) obstruction  -     [facility]- Rupa Red MD ([facility])  -     CBC Auto Differential; Future  -     BASIC METABOLIC PANEL;  Future            Lisandra Pate DO    10/15/2018  3:48 PM

## 2018-10-17 ENCOUNTER — HOSPITAL ENCOUNTER (OUTPATIENT)
Age: 68
Discharge: HOME | End: 2018-10-19
Payer: MEDICARE

## 2018-10-17 PROCEDURE — 88112 CYTOPATH CELL ENHANCE TECH: CPT

## 2018-10-18 ENCOUNTER — TELEPHONE (OUTPATIENT)
Dept: CARDIOLOGY CLINIC | Age: 68
End: 2018-10-18

## 2018-10-18 LAB — URINE CULTURE, ROUTINE: NORMAL

## 2018-10-22 ENCOUNTER — NURSE ONLY (OUTPATIENT)
Dept: FAMILY MEDICINE CLINIC | Age: 68
End: 2018-10-22
Payer: MEDICARE

## 2018-10-22 ENCOUNTER — HOSPITAL ENCOUNTER (OUTPATIENT)
Age: 68
Discharge: HOME | End: 2018-10-24
Payer: MEDICARE

## 2018-10-22 DIAGNOSIS — N28.9 LOW KIDNEY FUNCTION: Primary | ICD-10-CM

## 2018-10-22 DIAGNOSIS — N28.9 LOW KIDNEY FUNCTION: ICD-10-CM

## 2018-10-22 LAB
ANION GAP SERPL CALCULATED.3IONS-SCNC: 9 MMOL/L (ref 7–16)
BUN BLDV-MCNC: 17 MG/DL (ref 8–23)
CALCIUM SERPL-MCNC: 9.4 MG/DL (ref 8.6–10.2)
CHLORIDE BLD-SCNC: 105 MMOL/L (ref 98–107)
CO2: 28 MMOL/L (ref 22–29)
CREAT SERPL-MCNC: 1.3 MG/DL (ref 0.7–1.2)
GFR AFRICAN AMERICAN: >60
GFR NON-AFRICAN AMERICAN: 54 ML/MIN/1.73
GLUCOSE BLD-MCNC: 93 MG/DL (ref 74–109)
POTASSIUM SERPL-SCNC: 4.6 MMOL/L (ref 3.5–5)
SODIUM BLD-SCNC: 142 MMOL/L (ref 132–146)

## 2018-10-22 PROCEDURE — 80048 BASIC METABOLIC PNL TOTAL CA: CPT

## 2018-10-22 PROCEDURE — 36415 COLL VENOUS BLD VENIPUNCTURE: CPT | Performed by: FAMILY MEDICINE

## 2018-10-24 ENCOUNTER — ANESTHESIA EVENT (OUTPATIENT)
Dept: OPERATING ROOM | Age: 68
End: 2018-10-24
Payer: MEDICARE

## 2018-10-25 ENCOUNTER — HOSPITAL ENCOUNTER (OUTPATIENT)
Age: 68
Setting detail: OUTPATIENT SURGERY
Discharge: HOME | End: 2018-10-25
Attending: UROLOGY | Admitting: UROLOGY
Payer: MEDICARE

## 2018-10-25 ENCOUNTER — ANESTHESIA (OUTPATIENT)
Dept: OPERATING ROOM | Age: 68
End: 2018-10-25
Payer: MEDICARE

## 2018-10-25 VITALS
OXYGEN SATURATION: 96 % | WEIGHT: 210 LBS | TEMPERATURE: 97 F | RESPIRATION RATE: 20 BRPM | DIASTOLIC BLOOD PRESSURE: 77 MMHG | SYSTOLIC BLOOD PRESSURE: 158 MMHG | HEART RATE: 63 BPM | BODY MASS INDEX: 33.75 KG/M2 | HEIGHT: 66 IN

## 2018-10-25 VITALS
DIASTOLIC BLOOD PRESSURE: 62 MMHG | SYSTOLIC BLOOD PRESSURE: 109 MMHG | RESPIRATION RATE: 9 BRPM | OXYGEN SATURATION: 97 %

## 2018-10-25 DIAGNOSIS — Z01.812 PRE-OPERATIVE LABORATORY EXAMINATION: Primary | ICD-10-CM

## 2018-10-25 LAB
HCT VFR BLD CALC: 37.3 % (ref 37–54)
HEMOGLOBIN: 11.7 G/DL (ref 12.5–16.5)

## 2018-10-25 PROCEDURE — 87088 URINE BACTERIA CULTURE: CPT

## 2018-10-25 PROCEDURE — 6360000002 HC RX W HCPCS: Performed by: ANESTHESIOLOGY

## 2018-10-25 PROCEDURE — 3700000001 HC ADD 15 MINUTES (ANESTHESIA): Performed by: UROLOGY

## 2018-10-25 PROCEDURE — 2709999900 HC NON-CHARGEABLE SUPPLY: Performed by: UROLOGY

## 2018-10-25 PROCEDURE — 6360000002 HC RX W HCPCS: Performed by: UROLOGY

## 2018-10-25 PROCEDURE — C2617 STENT, NON-COR, TEM W/O DEL: HCPCS | Performed by: UROLOGY

## 2018-10-25 PROCEDURE — 36415 COLL VENOUS BLD VENIPUNCTURE: CPT

## 2018-10-25 PROCEDURE — C1758 CATHETER, URETERAL: HCPCS | Performed by: UROLOGY

## 2018-10-25 PROCEDURE — 7100000011 HC PHASE II RECOVERY - ADDTL 15 MIN: Performed by: UROLOGY

## 2018-10-25 PROCEDURE — 7100000000 HC PACU RECOVERY - FIRST 15 MIN: Performed by: UROLOGY

## 2018-10-25 PROCEDURE — 2580000003 HC RX 258: Performed by: UROLOGY

## 2018-10-25 PROCEDURE — 85014 HEMATOCRIT: CPT

## 2018-10-25 PROCEDURE — 2580000003 HC RX 258: Performed by: ANESTHESIOLOGY

## 2018-10-25 PROCEDURE — 7100000001 HC PACU RECOVERY - ADDTL 15 MIN: Performed by: UROLOGY

## 2018-10-25 PROCEDURE — 3700000000 HC ANESTHESIA ATTENDED CARE: Performed by: UROLOGY

## 2018-10-25 PROCEDURE — 85018 HEMOGLOBIN: CPT

## 2018-10-25 PROCEDURE — 3600000002 HC SURGERY LEVEL 2 BASE: Performed by: UROLOGY

## 2018-10-25 PROCEDURE — 2500000003 HC RX 250 WO HCPCS: Performed by: ANESTHESIOLOGY

## 2018-10-25 PROCEDURE — 7100000010 HC PHASE II RECOVERY - FIRST 15 MIN: Performed by: UROLOGY

## 2018-10-25 PROCEDURE — 3600000012 HC SURGERY LEVEL 2 ADDTL 15MIN: Performed by: UROLOGY

## 2018-10-25 DEVICE — URETERAL STENT
Type: IMPLANTABLE DEVICE | Site: URETER | Status: FUNCTIONAL
Brand: PERCUFLEX™

## 2018-10-25 RX ORDER — LIDOCAINE HYDROCHLORIDE 20 MG/ML
INJECTION, SOLUTION EPIDURAL; INFILTRATION; INTRACAUDAL; PERINEURAL PRN
Status: DISCONTINUED | OUTPATIENT
Start: 2018-10-25 | End: 2018-10-25 | Stop reason: SDUPTHER

## 2018-10-25 RX ORDER — SODIUM CHLORIDE 9 MG/ML
INJECTION, SOLUTION INTRAVENOUS CONTINUOUS
Status: DISCONTINUED | OUTPATIENT
Start: 2018-10-25 | End: 2018-10-25 | Stop reason: HOSPADM

## 2018-10-25 RX ORDER — PROPOFOL 10 MG/ML
INJECTION, EMULSION INTRAVENOUS CONTINUOUS PRN
Status: DISCONTINUED | OUTPATIENT
Start: 2018-10-25 | End: 2018-10-25 | Stop reason: SDUPTHER

## 2018-10-25 RX ORDER — OXYCODONE HYDROCHLORIDE AND ACETAMINOPHEN 5; 325 MG/1; MG/1
1 TABLET ORAL
Status: DISCONTINUED | OUTPATIENT
Start: 2018-10-25 | End: 2018-10-25 | Stop reason: HOSPADM

## 2018-10-25 RX ORDER — SODIUM CHLORIDE, SODIUM LACTATE, POTASSIUM CHLORIDE, CALCIUM CHLORIDE 600; 310; 30; 20 MG/100ML; MG/100ML; MG/100ML; MG/100ML
INJECTION, SOLUTION INTRAVENOUS CONTINUOUS
Status: DISCONTINUED | OUTPATIENT
Start: 2018-10-25 | End: 2018-10-25 | Stop reason: HOSPADM

## 2018-10-25 RX ORDER — PROMETHAZINE HYDROCHLORIDE 25 MG/ML
6.25 INJECTION, SOLUTION INTRAMUSCULAR; INTRAVENOUS
Status: DISCONTINUED | OUTPATIENT
Start: 2018-10-25 | End: 2018-10-25 | Stop reason: HOSPADM

## 2018-10-25 RX ORDER — DEXAMETHASONE SODIUM PHOSPHATE 10 MG/ML
INJECTION INTRAMUSCULAR; INTRAVENOUS PRN
Status: DISCONTINUED | OUTPATIENT
Start: 2018-10-25 | End: 2018-10-25 | Stop reason: SDUPTHER

## 2018-10-25 RX ORDER — OXYCODONE HYDROCHLORIDE AND ACETAMINOPHEN 5; 325 MG/1; MG/1
1 TABLET ORAL EVERY 4 HOURS PRN
Status: DISCONTINUED | OUTPATIENT
Start: 2018-10-25 | End: 2018-10-25 | Stop reason: HOSPADM

## 2018-10-25 RX ORDER — SODIUM CHLORIDE 9 MG/ML
INJECTION, SOLUTION INTRAVENOUS CONTINUOUS PRN
Status: DISCONTINUED | OUTPATIENT
Start: 2018-10-25 | End: 2018-10-25 | Stop reason: SDUPTHER

## 2018-10-25 RX ORDER — MEPERIDINE HYDROCHLORIDE 50 MG/ML
12.5 INJECTION INTRAMUSCULAR; INTRAVENOUS; SUBCUTANEOUS EVERY 5 MIN PRN
Status: DISCONTINUED | OUTPATIENT
Start: 2018-10-25 | End: 2018-10-25 | Stop reason: HOSPADM

## 2018-10-25 RX ORDER — DIPHENHYDRAMINE HYDROCHLORIDE 50 MG/ML
12.5 INJECTION INTRAMUSCULAR; INTRAVENOUS
Status: DISCONTINUED | OUTPATIENT
Start: 2018-10-25 | End: 2018-10-25 | Stop reason: HOSPADM

## 2018-10-25 RX ORDER — CIPROFLOXACIN 2 MG/ML
200 INJECTION, SOLUTION INTRAVENOUS
Status: COMPLETED | OUTPATIENT
Start: 2018-10-25 | End: 2018-10-25

## 2018-10-25 RX ORDER — FENTANYL CITRATE 50 UG/ML
INJECTION, SOLUTION INTRAMUSCULAR; INTRAVENOUS PRN
Status: DISCONTINUED | OUTPATIENT
Start: 2018-10-25 | End: 2018-10-25 | Stop reason: SDUPTHER

## 2018-10-25 RX ORDER — FENTANYL CITRATE 50 UG/ML
25 INJECTION, SOLUTION INTRAMUSCULAR; INTRAVENOUS EVERY 5 MIN PRN
Status: DISCONTINUED | OUTPATIENT
Start: 2018-10-25 | End: 2018-10-25 | Stop reason: HOSPADM

## 2018-10-25 RX ORDER — ONDANSETRON 2 MG/ML
INJECTION INTRAMUSCULAR; INTRAVENOUS PRN
Status: DISCONTINUED | OUTPATIENT
Start: 2018-10-25 | End: 2018-10-25 | Stop reason: SDUPTHER

## 2018-10-25 RX ORDER — FENTANYL CITRATE 50 UG/ML
50 INJECTION, SOLUTION INTRAMUSCULAR; INTRAVENOUS EVERY 5 MIN PRN
Status: DISCONTINUED | OUTPATIENT
Start: 2018-10-25 | End: 2018-10-25 | Stop reason: HOSPADM

## 2018-10-25 RX ORDER — PROPOFOL 10 MG/ML
INJECTION, EMULSION INTRAVENOUS PRN
Status: DISCONTINUED | OUTPATIENT
Start: 2018-10-25 | End: 2018-10-25 | Stop reason: SDUPTHER

## 2018-10-25 RX ORDER — MIDAZOLAM HYDROCHLORIDE 1 MG/ML
INJECTION INTRAMUSCULAR; INTRAVENOUS PRN
Status: DISCONTINUED | OUTPATIENT
Start: 2018-10-25 | End: 2018-10-25 | Stop reason: SDUPTHER

## 2018-10-25 RX ADMIN — SODIUM CHLORIDE: 9 INJECTION, SOLUTION INTRAVENOUS at 09:15

## 2018-10-25 RX ADMIN — FENTANYL CITRATE 50 MCG: 50 INJECTION, SOLUTION INTRAMUSCULAR; INTRAVENOUS at 08:26

## 2018-10-25 RX ADMIN — PROPOFOL 180 MG: 10 INJECTION, EMULSION INTRAVENOUS at 08:03

## 2018-10-25 RX ADMIN — FENTANYL CITRATE 50 MCG: 50 INJECTION, SOLUTION INTRAMUSCULAR; INTRAVENOUS at 08:13

## 2018-10-25 RX ADMIN — PHENYLEPHRINE HYDROCHLORIDE 100 MCG: 10 INJECTION INTRAVENOUS at 08:46

## 2018-10-25 RX ADMIN — PROPOFOL 80 MCG/KG/MIN: 10 INJECTION, EMULSION INTRAVENOUS at 08:36

## 2018-10-25 RX ADMIN — ONDANSETRON 4 MG: 2 INJECTION, SOLUTION INTRAMUSCULAR; INTRAVENOUS at 08:26

## 2018-10-25 RX ADMIN — PHENYLEPHRINE HYDROCHLORIDE 100 MCG: 10 INJECTION INTRAVENOUS at 08:39

## 2018-10-25 RX ADMIN — SODIUM CHLORIDE: 9 INJECTION, SOLUTION INTRAVENOUS at 07:59

## 2018-10-25 RX ADMIN — CIPROFLOXACIN 200 MG: 2 INJECTION, SOLUTION INTRAVENOUS at 07:56

## 2018-10-25 RX ADMIN — MIDAZOLAM HYDROCHLORIDE 2 MG: 1 INJECTION, SOLUTION INTRAMUSCULAR; INTRAVENOUS at 07:59

## 2018-10-25 RX ADMIN — DEXAMETHASONE SODIUM PHOSPHATE 10 MG: 10 INJECTION INTRAMUSCULAR; INTRAVENOUS at 08:03

## 2018-10-25 RX ADMIN — PHENYLEPHRINE HYDROCHLORIDE 100 MCG: 10 INJECTION INTRAVENOUS at 08:57

## 2018-10-25 RX ADMIN — PHENYLEPHRINE HYDROCHLORIDE 100 MCG: 10 INJECTION INTRAVENOUS at 08:53

## 2018-10-25 RX ADMIN — SODIUM CHLORIDE: 9 INJECTION, SOLUTION INTRAVENOUS at 07:14

## 2018-10-25 RX ADMIN — LIDOCAINE HYDROCHLORIDE 100 MG: 20 INJECTION, SOLUTION EPIDURAL; INFILTRATION; INTRACAUDAL; PERINEURAL at 08:03

## 2018-10-25 ASSESSMENT — PULMONARY FUNCTION TESTS
PIF_VALUE: 0
PIF_VALUE: 15
PIF_VALUE: 0
PIF_VALUE: 15
PIF_VALUE: 0
PIF_VALUE: 0
PIF_VALUE: 17
PIF_VALUE: 0
PIF_VALUE: 15
PIF_VALUE: 0
PIF_VALUE: 0
PIF_VALUE: 8
PIF_VALUE: 0
PIF_VALUE: 8
PIF_VALUE: 14
PIF_VALUE: 0
PIF_VALUE: 0
PIF_VALUE: 15
PIF_VALUE: 0
PIF_VALUE: 15
PIF_VALUE: 16
PIF_VALUE: 0
PIF_VALUE: 15
PIF_VALUE: 0
PIF_VALUE: 15
PIF_VALUE: 15
PIF_VALUE: 0
PIF_VALUE: 15
PIF_VALUE: 0
PIF_VALUE: 1
PIF_VALUE: 0
PIF_VALUE: 16
PIF_VALUE: 15
PIF_VALUE: 0
PIF_VALUE: 15
PIF_VALUE: 1
PIF_VALUE: 0
PIF_VALUE: 1
PIF_VALUE: 15
PIF_VALUE: 0
PIF_VALUE: 15
PIF_VALUE: 15
PIF_VALUE: 0
PIF_VALUE: 15
PIF_VALUE: 0
PIF_VALUE: 0
PIF_VALUE: 15
PIF_VALUE: 15
PIF_VALUE: 0
PIF_VALUE: 0
PIF_VALUE: 1
PIF_VALUE: 0
PIF_VALUE: 15
PIF_VALUE: 0
PIF_VALUE: 15
PIF_VALUE: 15
PIF_VALUE: 0
PIF_VALUE: 0

## 2018-10-25 ASSESSMENT — PAIN - FUNCTIONAL ASSESSMENT: PAIN_FUNCTIONAL_ASSESSMENT: 0-10

## 2018-10-25 ASSESSMENT — PAIN SCALES - GENERAL
PAINLEVEL_OUTOF10: 0
PAINLEVEL_OUTOF10: 0
PAINLEVEL_OUTOF10: 2
PAINLEVEL_OUTOF10: 2
PAINLEVEL_OUTOF10: 0

## 2018-10-25 ASSESSMENT — PAIN DESCRIPTION - DESCRIPTORS
DESCRIPTORS: DISCOMFORT;OTHER (COMMENT)
DESCRIPTORS: DISCOMFORT;OTHER (COMMENT)

## 2018-10-25 ASSESSMENT — PAIN DESCRIPTION - LOCATION
LOCATION: PENIS
LOCATION: PENIS

## 2018-10-25 NOTE — H&P
(primary) hypertension)   Gastro-esophageal reflux disease without esophagitis (Gastro-esophageal reflux disease without esophagitis)   Obesity, unspecified (Obesity, unspecified)   Pure hypercholesterolemia, unspecified (Pure hypercholesterolemia, unspecified)   Unspecified atrial fibrillation (Unspecified atrial fibrillation)         FAMILY HISTORY: Diabetes - Runs in Family  Hypertension - Runs in Family  Osteoporosis - Aunt      SOCIAL HISTORY: Marital Status:   Preferred Language: English; Ethnicity: Not  Or ; Race: White  Current Smoking Status: Patient has never smoked.      Tobacco Use Assessment Completed: Used Tobacco in last 30 days? Does not use smokeless tobacco.  Drinks 2 drinks per week. Does not use drugs. Drinks 2 caffeinated drinks per day. Has not had a blood transfusion. Patient's occupation is/was [employer]. Notes: TWO CHILDREN. GRADUATED FROM [employer]      REVIEW OF SYSTEMS:     Constitutional:   Patient denies fever, chills, and weight loss. Eyes:   Patient reports wearing glasses. Ears, Nose, Mouth, Throat:   Patient denies hearing loss. Cardiovascular:   Patient reports irregular heartbeat. Patient denies chest pains and swollen ankles. Respiratory:   Patient denies shortness of breath, wheezing, and chronic cough. Gastrointestinal:   Patient denies abdominal pain, nausea/vomiting, and change in bowels. Genitourinary:   Patient reports blood in urine. Patient denies incontinence, painful urination, auguste catheter, and suprapubic catheter. Musculoskeletal:   Patient denies using cane, using walker, and using wheelchair. Integumentary/Skin:   Patient denies rash, persistent itching, and skin cancer history. Neurological:   Patient denies numbness, tingling, dizziness, and altered mental status. Hematologic/Lymphatic:   Patient denies swollen glands, abnormal bleeding, and history blood transfusion.

## 2018-10-25 NOTE — OP NOTE
[address] [place] [address]                 [address] [place], [address] [place] [address]                               OPERATIVE REPORT    PATIENT NAME: Tony Pfeiffer                   :         1950  MED REC NO:   [id number]                            ROOM:  ACCOUNT NO:   [de-identified]                           ADMIT DATE:  10/22/2018  PROVIDER:     Janessa Garcia MD    DATE OF PROCEDURE:  10/25/2018    PREOPERATIVE DIAGNOSIS:  Right upper ureteral calculus. POSTOPERATIVE DIAGNOSIS:  Right upper ureteral calculus. OPERATION:  Cystopanendoscopy, attempted stent, stone manipulation,  stent placement, shockwave lithotripsy. SURGEON:  Janessa Garcia M.D. ANESTHESIA:  General.    OPERATION REPORT:  With the patient in the lithotomy position under  satisfactory anesthesia, the genitalia were prepped with Betadine and  draped in a sterile fashion. A 21-Sammarinese panendoscope passed easily  through the pendulous and membranous urethra. There were no  strictures or lesions seen. The prostatic fossa showed trilobar  development with some enlargement of the prostatic lateral lobes. Upon entering the bladder, the bladder was 1 to 2+ trabeculated. The  mucosal pattern was normal.  The trigone was symmetrical.  The  ureteral orifices with normal configuration and location. An  open-ended catheter was passed through the right ureteral orifice and  the fluoroscopy was directed up to the large calculus which was in the  upper ureter. I attempted to do hydraulic manipulation. This was  unsuccessful. I subsequently placed a wire, which was manipulated  around the wire into the area of the renal pelvis. Again, I tried  manipulation which was unsuccessful and placed a second wire around  the stone and again tried to nudge calculus into the renal pelvis.    When this was unsuccessful, I placed a 28-cm 6-Sammarinese double-J stent,  one end curled in the renal pelvis, the other in

## 2018-10-25 NOTE — PROGRESS NOTES
Patient recovering Phase 2 in  PACU & now in [facility]. Family called bedside.
remainder of the day due to having had anesthesia. PARKING INSTRUCTIONS:   · [x] Arrival Time 0600  · [x] Parking lot 1 is located on [facility] (the corner of [facility] and [facility]). To enter, press the button and the gate will lift. A free token will be provided to exit the lot. One car per patient is allowed to park in this lot. All other cars are to park on [address] [place] [address] Street either in the parking garage or the handicap lot. [] Free  parking is available on [address] [place] [address]. · [] To reach the [facility] lobby from [address] [place] [address], upon entering the hospital, take elevator B to the 3rd floor. EDUCATION INSTRUCTIONS:      [] Knee or hip replacement booklet & exercise pamphlets given. [] Barak Baca placed in chart. [] Pre-admission Testing educational folder given  [] Incentive Spirometry,coughing & deep breathing exercises reviewed. []Medication information sheet(s)   []Fluoroscopy-Xray used in surgery reviewed with patient. Educational pamphlet placed in chart. []Pain: Post-op pain is normal and to be expected. You will be asked to rate your pain from 0-10(a zero is not acceptable-education is needed). Your post-op pain goal is:  [] Ask your nurse for your pain medication. [] Joint camp offered. [] Joint replacement booklets given. []Other     MEDICATION INSTRUCTIONS:   [x]Bring a complete list of your medications, please write the last time you took the medicine, give this list to the nurse. [x] Take the following medications the morning of surgery with 1-2 ounces of water: SEE MED LIST  [] Stop herbal supplements and vitamins 5 days before your surgery. [] DO NOT take any diabetic medicine the morning of surgery. Follow instructions for insulin the day before surgery. [] If you are diabetic and your blood sugar is low or you feel symptomatic, you may drink 1-2 ounces of apple juice or take a glucose tablet.   The morning of your

## 2018-10-25 NOTE — ANESTHESIA PRE PROCEDURE
Department of Anesthesiology  Preprocedure Note       Name:  Kelly Laser   Age:  77 y.o.  :  1950                                          MRN:  [id number]         Date:  10/25/2018      Surgeon: Jeff Encinas):  Ibrahima Orozco MD    Procedure: ESWL EXTRACORPEAL SHOCK WAVE LITHOTRIPSY WITH CYSTO AND RIGHT STENT PLACEMENT (Right )    Medications prior to admission:   Prior to Admission medications    Medication Sig Start Date End Date Taking? Authorizing Provider   pantoprazole (PROTONIX) 40 MG tablet TAKE 1 TABLET EVERY DAY 9/10/18  Yes jessica Alpha, DO   metoprolol tartrate (LOPRESSOR) 25 MG tablet TAKE 1 TABLET TWICE DAILY 9/10/18  Yes Jannell Alpha, DO   ramipril (ALTACE) 10 MG capsule TAKE 1 CAPSULE TWICE DAILY 9/10/18  Yes Jannell Alpha, DO   ACETAMINOPHEN PO Take 650 mg by mouth 2 times daily as needed    Yes Historical Provider, MD   cetirizine (ZYRTEC) 5 MG tablet Take 5 mg by mouth daily. Yes Historical Provider, MD   atorvastatin (LIPITOR) 40 MG tablet TAKE 1 TABLET EVERY DAY 9/10/18   Jannell Alpha, DO   amLODIPine (NORVASC) 5 MG tablet TAKE 1 TABLET EVERY DAY 9/10/18   Jannell Alpha, DO   apixaban (ELIQUIS) 5 MG TABS tablet Take 1 tablet by mouth 2 times daily 18   Marcus Sanchez MD       Current medications:    Current Facility-Administered Medications   Medication Dose Route Frequency Provider Last Rate Last Dose    0.9 % sodium chloride infusion   Intravenous Continuous Atiya Miller  mL/hr at 10/25/18 0714      ciprofloxacin (CIPRO) IVPB 200 mg  200 mg Intravenous On Call to [address], MD           Allergies:     Allergies   Allergen Reactions    Pcn [Penicillins] Rash       Problem List:    Patient Active Problem List   Diagnosis Code    Hypertension I10    Hyperlipidemia E78.5    Persistent atrial fibrillation ([facility] [address] I48.1    Aortic valve disease I35.9       Past Medical History:        Diagnosis Date    Acid reflux     Heart murmur     Hyperlipidemia     Hypertension        Past Surgical History:        Procedure Laterality Date    CARDIOVERSION  05/30/2018    EYE SURGERY Left 05/2018    cataracts, retinal tear repair.  HERNIA REPAIR      KIDNEY REMOVAL Right     Half of kidney removed.  KIDNEY SURGERY      TRANSESOPHAGEAL ECHOCARDIOGRAM  05/30/2018       Social History:    Social History   Substance Use Topics    Smoking status: Former Smoker     Packs/day: 0.25     Years: 0.50     Quit date: 5/30/1978    Smokeless tobacco: Never Used    Alcohol use Yes     3 Glasses of wine per week      Comment: occasionally                                Counseling given: Not Answered      Vital Signs (Current):   Vitals:    10/22/18 1145 10/25/18 0650   BP:  128/73   Pulse:  51   Resp:  18   Temp:  97.3 °F (36.3 °C)   TempSrc:  Temporal   SpO2:  96%   Weight: 210 lb (95.3 kg) 210 lb (95.3 kg)   Height: 5' 6\" (1.676 m) 5' 6\" (1.676 m)                                              BP Readings from Last 3 Encounters:   10/25/18 128/73   10/15/18 114/80   09/26/18 124/70       NPO Status: Time of last liquid consumption: 2230                        Time of last solid consumption: 1900                        Date of last liquid consumption: 10/24/18                        Date of last solid food consumption: 10/24/18    BMI:   Wt Readings from Last 3 Encounters:   10/25/18 210 lb (95.3 kg)   10/15/18 216 lb (98 kg)   09/26/18 215 lb (97.5 kg)     Body mass index is 33.89 kg/m².     CBC:   Lab Results   Component Value Date    WBC 10.8 10/15/2018    RBC 4.91 10/15/2018    HGB 13.3 10/15/2018    HCT 42.3 10/15/2018    MCV 86.2 10/15/2018    RDW 14.2 10/15/2018     10/15/2018       CMP:   Lab Results   Component Value Date     10/22/2018    K 4.6 10/22/2018    K 4.3 05/14/2018     10/22/2018    CO2 28 10/22/2018    BUN 17 10/22/2018    CREATININE 1.3 10/22/2018    GFRAA >60 10/22/2018    LABGLOM 54 10/22/2018    GLUCOSE 93 10/22/2018

## 2018-10-27 LAB — URINE CULTURE, ROUTINE: NORMAL

## 2018-10-30 ENCOUNTER — HOSPITAL ENCOUNTER (OUTPATIENT)
Age: 68
Discharge: HOME | End: 2018-11-01
Payer: MEDICARE

## 2018-10-30 ENCOUNTER — HOSPITAL ENCOUNTER (OUTPATIENT)
Dept: GENERAL RADIOLOGY | Age: 68
Discharge: HOME | End: 2018-11-01
Payer: MEDICARE

## 2018-10-30 DIAGNOSIS — R52 PAIN: ICD-10-CM

## 2018-10-30 PROCEDURE — 74018 RADEX ABDOMEN 1 VIEW: CPT

## 2018-11-05 ENCOUNTER — OFFICE VISIT (OUTPATIENT)
Dept: CARDIOLOGY CLINIC | Age: 68
End: 2018-11-05
Payer: MEDICARE

## 2018-11-05 VITALS
HEART RATE: 72 BPM | WEIGHT: 213 LBS | RESPIRATION RATE: 16 BRPM | DIASTOLIC BLOOD PRESSURE: 74 MMHG | HEIGHT: 66 IN | SYSTOLIC BLOOD PRESSURE: 126 MMHG | BODY MASS INDEX: 34.23 KG/M2

## 2018-11-05 DIAGNOSIS — I48.19 PERSISTENT ATRIAL FIBRILLATION (HCC): Primary | ICD-10-CM

## 2018-11-05 PROCEDURE — 1036F TOBACCO NON-USER: CPT | Performed by: INTERNAL MEDICINE

## 2018-11-05 PROCEDURE — 1101F PT FALLS ASSESS-DOCD LE1/YR: CPT | Performed by: INTERNAL MEDICINE

## 2018-11-05 PROCEDURE — 1123F ACP DISCUSS/DSCN MKR DOCD: CPT | Performed by: INTERNAL MEDICINE

## 2018-11-05 PROCEDURE — 99213 OFFICE O/P EST LOW 20 MIN: CPT | Performed by: INTERNAL MEDICINE

## 2018-11-05 PROCEDURE — 3017F COLORECTAL CA SCREEN DOC REV: CPT | Performed by: INTERNAL MEDICINE

## 2018-11-05 PROCEDURE — 93000 ELECTROCARDIOGRAM COMPLETE: CPT | Performed by: INTERNAL MEDICINE

## 2018-11-05 PROCEDURE — G8427 DOCREV CUR MEDS BY ELIG CLIN: HCPCS | Performed by: INTERNAL MEDICINE

## 2018-11-05 PROCEDURE — G8417 CALC BMI ABV UP PARAM F/U: HCPCS | Performed by: INTERNAL MEDICINE

## 2018-11-05 PROCEDURE — 4040F PNEUMOC VAC/ADMIN/RCVD: CPT | Performed by: INTERNAL MEDICINE

## 2018-11-05 PROCEDURE — G8482 FLU IMMUNIZE ORDER/ADMIN: HCPCS | Performed by: INTERNAL MEDICINE

## 2018-12-14 ENCOUNTER — TELEPHONE (OUTPATIENT)
Dept: CARDIOLOGY CLINIC | Age: 68
End: 2018-12-14

## 2019-02-15 RX ORDER — AMLODIPINE BESYLATE 5 MG/1
TABLET ORAL
Qty: 90 TABLET | Refills: 3 | Status: SHIPPED | OUTPATIENT
Start: 2019-02-15 | End: 2019-10-15 | Stop reason: SINTOL

## 2019-02-15 RX ORDER — PANTOPRAZOLE SODIUM 40 MG/1
TABLET, DELAYED RELEASE ORAL
Qty: 90 TABLET | Refills: 3 | Status: SHIPPED
Start: 2019-02-15 | End: 2020-11-29 | Stop reason: SDUPTHER

## 2019-02-27 ENCOUNTER — TELEPHONE (OUTPATIENT)
Dept: FAMILY MEDICINE CLINIC | Age: 69
End: 2019-02-27

## 2019-02-27 DIAGNOSIS — L98.9 SKIN LESION OF SCALP: Primary | ICD-10-CM

## 2019-03-25 RX ORDER — RAMIPRIL 10 MG/1
10 CAPSULE ORAL 2 TIMES DAILY
Qty: 180 CAPSULE | Refills: 3 | Status: SHIPPED
Start: 2019-03-25 | End: 2020-04-28

## 2019-04-29 ENCOUNTER — HOSPITAL ENCOUNTER (OUTPATIENT)
Dept: GENERAL RADIOLOGY | Age: 69
Discharge: HOME | End: 2019-05-01
Payer: MEDICARE

## 2019-04-29 ENCOUNTER — HOSPITAL ENCOUNTER (OUTPATIENT)
Age: 69
Discharge: HOME | End: 2019-04-29
Payer: MEDICARE

## 2019-04-29 DIAGNOSIS — R52 PAIN: ICD-10-CM

## 2019-04-29 PROCEDURE — 74018 RADEX ABDOMEN 1 VIEW: CPT

## 2019-06-03 RX ORDER — ATORVASTATIN CALCIUM 40 MG/1
40 TABLET, FILM COATED ORAL DAILY
Qty: 90 TABLET | Refills: 3 | Status: SHIPPED
Start: 2019-06-03 | End: 2020-04-28

## 2019-10-15 ENCOUNTER — OFFICE VISIT (OUTPATIENT)
Dept: PRIMARY CARE CLINIC | Age: 69
End: 2019-10-15
Payer: MEDICARE

## 2019-10-15 ENCOUNTER — HOSPITAL ENCOUNTER (OUTPATIENT)
Age: 69
Discharge: HOME | End: 2019-10-17
Payer: MEDICARE

## 2019-10-15 VITALS
HEIGHT: 66 IN | BODY MASS INDEX: 32.3 KG/M2 | OXYGEN SATURATION: 98 % | RESPIRATION RATE: 18 BRPM | WEIGHT: 201 LBS | SYSTOLIC BLOOD PRESSURE: 118 MMHG | TEMPERATURE: 98.2 F | DIASTOLIC BLOOD PRESSURE: 72 MMHG | HEART RATE: 75 BPM

## 2019-10-15 DIAGNOSIS — Z00.00 ROUTINE GENERAL MEDICAL EXAMINATION AT A HEALTH CARE FACILITY: ICD-10-CM

## 2019-10-15 DIAGNOSIS — E78.5 HYPERLIPIDEMIA, UNSPECIFIED HYPERLIPIDEMIA TYPE: ICD-10-CM

## 2019-10-15 DIAGNOSIS — Z12.5 PROSTATE CANCER SCREENING: ICD-10-CM

## 2019-10-15 DIAGNOSIS — I48.19 PERSISTENT ATRIAL FIBRILLATION (HCC): ICD-10-CM

## 2019-10-15 DIAGNOSIS — Z23 NEED FOR PROPHYLACTIC VACCINATION AND INOCULATION AGAINST INFLUENZA: Primary | ICD-10-CM

## 2019-10-15 DIAGNOSIS — I10 ESSENTIAL HYPERTENSION: ICD-10-CM

## 2019-10-15 DIAGNOSIS — Z12.11 COLON CANCER SCREENING: ICD-10-CM

## 2019-10-15 LAB
ALBUMIN SERPL-MCNC: 4.3 G/DL (ref 3.5–5.2)
ALP BLD-CCNC: 60 U/L (ref 40–129)
ALT SERPL-CCNC: 27 U/L (ref 0–40)
ANION GAP SERPL CALCULATED.3IONS-SCNC: 13 MMOL/L (ref 7–16)
AST SERPL-CCNC: 22 U/L (ref 0–39)
BASOPHILS ABSOLUTE: 0.02 E9/L (ref 0–0.2)
BASOPHILS RELATIVE PERCENT: 0.4 % (ref 0–2)
BILIRUB SERPL-MCNC: 1.5 MG/DL (ref 0–1.2)
BUN BLDV-MCNC: 19 MG/DL (ref 8–23)
CALCIUM SERPL-MCNC: 9.4 MG/DL (ref 8.6–10.2)
CHLORIDE BLD-SCNC: 107 MMOL/L (ref 98–107)
CHOLESTEROL, TOTAL: 147 MG/DL (ref 0–199)
CO2: 25 MMOL/L (ref 22–29)
CREAT SERPL-MCNC: 1.1 MG/DL (ref 0.7–1.2)
EOSINOPHILS ABSOLUTE: 0.08 E9/L (ref 0.05–0.5)
EOSINOPHILS RELATIVE PERCENT: 1.5 % (ref 0–6)
GFR AFRICAN AMERICAN: >60
GFR NON-AFRICAN AMERICAN: >60 ML/MIN/1.73
GLUCOSE BLD-MCNC: 106 MG/DL (ref 74–99)
HCT VFR BLD CALC: 41.6 % (ref 37–54)
HDLC SERPL-MCNC: 50 MG/DL
HEMOGLOBIN: 12.7 G/DL (ref 12.5–16.5)
IMMATURE GRANULOCYTES #: 0.02 E9/L
IMMATURE GRANULOCYTES %: 0.4 % (ref 0–5)
LDL CHOLESTEROL CALCULATED: 82 MG/DL (ref 0–99)
LYMPHOCYTES ABSOLUTE: 1.32 E9/L (ref 1.5–4)
LYMPHOCYTES RELATIVE PERCENT: 25.3 % (ref 20–42)
MCH RBC QN AUTO: 26.9 PG (ref 26–35)
MCHC RBC AUTO-ENTMCNC: 30.5 % (ref 32–34.5)
MCV RBC AUTO: 88.1 FL (ref 80–99.9)
MONOCYTES ABSOLUTE: 0.43 E9/L (ref 0.1–0.95)
MONOCYTES RELATIVE PERCENT: 8.2 % (ref 2–12)
NEUTROPHILS ABSOLUTE: 3.35 E9/L (ref 1.8–7.3)
NEUTROPHILS RELATIVE PERCENT: 64.2 % (ref 43–80)
PDW BLD-RTO: 14 FL (ref 11.5–15)
PLATELET # BLD: 195 E9/L (ref 130–450)
PMV BLD AUTO: 10.9 FL (ref 7–12)
POTASSIUM SERPL-SCNC: 4.9 MMOL/L (ref 3.5–5)
PROSTATE SPECIFIC ANTIGEN: 1.83 NG/ML (ref 0–4)
RBC # BLD: 4.72 E12/L (ref 3.8–5.8)
SODIUM BLD-SCNC: 145 MMOL/L (ref 132–146)
TOTAL PROTEIN: 7.6 G/DL (ref 6.4–8.3)
TRIGL SERPL-MCNC: 77 MG/DL (ref 0–149)
TSH SERPL DL<=0.05 MIU/L-ACNC: 1.41 UIU/ML (ref 0.27–4.2)
VLDLC SERPL CALC-MCNC: 15 MG/DL
WBC # BLD: 5.2 E9/L (ref 4.5–11.5)

## 2019-10-15 PROCEDURE — 85025 COMPLETE CBC W/AUTO DIFF WBC: CPT

## 2019-10-15 PROCEDURE — 36415 COLL VENOUS BLD VENIPUNCTURE: CPT

## 2019-10-15 PROCEDURE — G0439 PPPS, SUBSEQ VISIT: HCPCS | Performed by: FAMILY MEDICINE

## 2019-10-15 PROCEDURE — 3017F COLORECTAL CA SCREEN DOC REV: CPT | Performed by: FAMILY MEDICINE

## 2019-10-15 PROCEDURE — G8482 FLU IMMUNIZE ORDER/ADMIN: HCPCS | Performed by: FAMILY MEDICINE

## 2019-10-15 PROCEDURE — 84443 ASSAY THYROID STIM HORMONE: CPT

## 2019-10-15 PROCEDURE — G0008 ADMIN INFLUENZA VIRUS VAC: HCPCS | Performed by: FAMILY MEDICINE

## 2019-10-15 PROCEDURE — 90653 IIV ADJUVANT VACCINE IM: CPT | Performed by: FAMILY MEDICINE

## 2019-10-15 PROCEDURE — G0103 PSA SCREENING: HCPCS

## 2019-10-15 PROCEDURE — 1123F ACP DISCUSS/DSCN MKR DOCD: CPT | Performed by: FAMILY MEDICINE

## 2019-10-15 PROCEDURE — 80061 LIPID PANEL: CPT

## 2019-10-15 PROCEDURE — 80053 COMPREHEN METABOLIC PANEL: CPT

## 2019-10-15 PROCEDURE — 4040F PNEUMOC VAC/ADMIN/RCVD: CPT | Performed by: FAMILY MEDICINE

## 2019-10-15 ASSESSMENT — LIFESTYLE VARIABLES
AUDIT TOTAL SCORE: 3
HOW OFTEN DO YOU HAVE A DRINK CONTAINING ALCOHOL: 3
HOW OFTEN DURING THE LAST YEAR HAVE YOU BEEN UNABLE TO REMEMBER WHAT HAPPENED THE NIGHT BEFORE BECAUSE YOU HAD BEEN DRINKING: 0
AUDIT-C TOTAL SCORE: 3
HAS A RELATIVE, FRIEND, DOCTOR, OR ANOTHER HEALTH PROFESSIONAL EXPRESSED CONCERN ABOUT YOUR DRINKING OR SUGGESTED YOU CUT DOWN: 0
HAVE YOU OR SOMEONE ELSE BEEN INJURED AS A RESULT OF YOUR DRINKING: 0
HOW MANY STANDARD DRINKS CONTAINING ALCOHOL DO YOU HAVE ON A TYPICAL DAY: 0
HOW OFTEN DURING THE LAST YEAR HAVE YOU HAD A FEELING OF GUILT OR REMORSE AFTER DRINKING: 0
HOW OFTEN DURING THE LAST YEAR HAVE YOU NEEDED AN ALCOHOLIC DRINK FIRST THING IN THE MORNING TO GET YOURSELF GOING AFTER A NIGHT OF HEAVY DRINKING: 0
HOW OFTEN DURING THE LAST YEAR HAVE YOU FAILED TO DO WHAT WAS NORMALLY EXPECTED FROM YOU BECAUSE OF DRINKING: 0
HOW OFTEN DO YOU HAVE SIX OR MORE DRINKS ON ONE OCCASION: 0
HOW OFTEN DURING THE LAST YEAR HAVE YOU FOUND THAT YOU WERE NOT ABLE TO STOP DRINKING ONCE YOU HAD STARTED: 0

## 2019-10-15 ASSESSMENT — PATIENT HEALTH QUESTIONNAIRE - PHQ9
SUM OF ALL RESPONSES TO PHQ QUESTIONS 1-9: 0
SUM OF ALL RESPONSES TO PHQ QUESTIONS 1-9: 0

## 2019-11-04 ENCOUNTER — OFFICE VISIT (OUTPATIENT)
Dept: CARDIOLOGY CLINIC | Age: 69
End: 2019-11-04
Payer: MEDICARE

## 2019-11-04 VITALS
SYSTOLIC BLOOD PRESSURE: 138 MMHG | RESPIRATION RATE: 16 BRPM | HEART RATE: 69 BPM | HEIGHT: 66 IN | WEIGHT: 197 LBS | BODY MASS INDEX: 31.66 KG/M2 | DIASTOLIC BLOOD PRESSURE: 84 MMHG

## 2019-11-04 DIAGNOSIS — I48.19 PERSISTENT ATRIAL FIBRILLATION (HCC): Primary | ICD-10-CM

## 2019-11-04 DIAGNOSIS — I35.1 AORTIC VALVE INSUFFICIENCY, ETIOLOGY OF CARDIAC VALVE DISEASE UNSPECIFIED: ICD-10-CM

## 2019-11-04 PROCEDURE — 4040F PNEUMOC VAC/ADMIN/RCVD: CPT | Performed by: INTERNAL MEDICINE

## 2019-11-04 PROCEDURE — 99214 OFFICE O/P EST MOD 30 MIN: CPT | Performed by: INTERNAL MEDICINE

## 2019-11-04 PROCEDURE — 93000 ELECTROCARDIOGRAM COMPLETE: CPT | Performed by: INTERNAL MEDICINE

## 2019-11-04 PROCEDURE — 1123F ACP DISCUSS/DSCN MKR DOCD: CPT | Performed by: INTERNAL MEDICINE

## 2019-11-04 PROCEDURE — G8417 CALC BMI ABV UP PARAM F/U: HCPCS | Performed by: INTERNAL MEDICINE

## 2019-11-04 PROCEDURE — 1036F TOBACCO NON-USER: CPT | Performed by: INTERNAL MEDICINE

## 2019-11-04 PROCEDURE — G8427 DOCREV CUR MEDS BY ELIG CLIN: HCPCS | Performed by: INTERNAL MEDICINE

## 2019-11-04 PROCEDURE — G8482 FLU IMMUNIZE ORDER/ADMIN: HCPCS | Performed by: INTERNAL MEDICINE

## 2019-11-04 PROCEDURE — 3017F COLORECTAL CA SCREEN DOC REV: CPT | Performed by: INTERNAL MEDICINE

## 2019-11-06 ENCOUNTER — TELEPHONE (OUTPATIENT)
Dept: CARDIOLOGY CLINIC | Age: 69
End: 2019-11-06

## 2019-11-06 ENCOUNTER — HOSPITAL ENCOUNTER (OUTPATIENT)
Dept: CARDIOLOGY | Age: 69
Discharge: HOME | End: 2019-11-06
Payer: MEDICARE

## 2019-11-06 DIAGNOSIS — I35.1 AORTIC VALVE INSUFFICIENCY, ETIOLOGY OF CARDIAC VALVE DISEASE UNSPECIFIED: ICD-10-CM

## 2019-11-06 LAB
LV EF: 55 %
LVEF MODALITY: NORMAL

## 2019-11-06 PROCEDURE — 93306 TTE W/DOPPLER COMPLETE: CPT

## 2019-11-07 ENCOUNTER — TELEPHONE (OUTPATIENT)
Dept: CARDIOLOGY CLINIC | Age: 69
End: 2019-11-07

## 2019-12-20 ENCOUNTER — OFFICE VISIT (OUTPATIENT)
Dept: PRIMARY CARE CLINIC | Age: 69
End: 2019-12-20
Payer: MEDICARE

## 2019-12-20 VITALS
HEART RATE: 64 BPM | WEIGHT: 202 LBS | SYSTOLIC BLOOD PRESSURE: 110 MMHG | BODY MASS INDEX: 32.47 KG/M2 | RESPIRATION RATE: 16 BRPM | TEMPERATURE: 97.9 F | HEIGHT: 66 IN | OXYGEN SATURATION: 98 % | DIASTOLIC BLOOD PRESSURE: 74 MMHG

## 2019-12-20 DIAGNOSIS — J02.9 PHARYNGITIS, UNSPECIFIED ETIOLOGY: Primary | ICD-10-CM

## 2019-12-20 PROCEDURE — 4040F PNEUMOC VAC/ADMIN/RCVD: CPT | Performed by: FAMILY MEDICINE

## 2019-12-20 PROCEDURE — 99213 OFFICE O/P EST LOW 20 MIN: CPT | Performed by: FAMILY MEDICINE

## 2019-12-20 PROCEDURE — 1036F TOBACCO NON-USER: CPT | Performed by: FAMILY MEDICINE

## 2019-12-20 PROCEDURE — 3017F COLORECTAL CA SCREEN DOC REV: CPT | Performed by: FAMILY MEDICINE

## 2019-12-20 PROCEDURE — G8427 DOCREV CUR MEDS BY ELIG CLIN: HCPCS | Performed by: FAMILY MEDICINE

## 2019-12-20 PROCEDURE — G8482 FLU IMMUNIZE ORDER/ADMIN: HCPCS | Performed by: FAMILY MEDICINE

## 2019-12-20 PROCEDURE — 1123F ACP DISCUSS/DSCN MKR DOCD: CPT | Performed by: FAMILY MEDICINE

## 2019-12-20 PROCEDURE — G8417 CALC BMI ABV UP PARAM F/U: HCPCS | Performed by: FAMILY MEDICINE

## 2019-12-20 RX ORDER — DOXYCYCLINE HYCLATE 100 MG
100 TABLET ORAL 2 TIMES DAILY
Qty: 20 TABLET | Refills: 0 | Status: SHIPPED | OUTPATIENT
Start: 2019-12-20 | End: 2019-12-30

## 2019-12-20 ASSESSMENT — ENCOUNTER SYMPTOMS
WHEEZING: 0
SORE THROAT: 1
SHORTNESS OF BREATH: 0
DIARRHEA: 0
SINUS PRESSURE: 0
FACIAL SWELLING: 0
COLOR CHANGE: 0
APNEA: 0
PHOTOPHOBIA: 0
COUGH: 0
NAUSEA: 0
ALLERGIC/IMMUNOLOGIC NEGATIVE: 1
VOMITING: 0
BACK PAIN: 0
BLOOD IN STOOL: 0
CHEST TIGHTNESS: 0
ABDOMINAL PAIN: 0

## 2020-01-02 PROBLEM — I38 VALVULAR HEART DISEASE: Status: ACTIVE | Noted: 2020-01-02

## 2020-01-02 PROBLEM — I71.21 THORACIC ASCENDING AORTIC ANEURYSM: Status: ACTIVE | Noted: 2020-01-02

## 2020-01-09 ENCOUNTER — OFFICE VISIT (OUTPATIENT)
Dept: CARDIOLOGY CLINIC | Age: 70
End: 2020-01-09
Payer: MEDICARE

## 2020-01-09 ENCOUNTER — TELEPHONE (OUTPATIENT)
Dept: CARDIOLOGY CLINIC | Age: 70
End: 2020-01-09

## 2020-01-09 VITALS
HEIGHT: 66 IN | WEIGHT: 200 LBS | DIASTOLIC BLOOD PRESSURE: 82 MMHG | SYSTOLIC BLOOD PRESSURE: 136 MMHG | BODY MASS INDEX: 32.14 KG/M2 | HEART RATE: 77 BPM | RESPIRATION RATE: 16 BRPM

## 2020-01-09 PROBLEM — I71.20 THORACIC AORTIC ANEURYSM WITHOUT RUPTURE: Status: ACTIVE | Noted: 2020-01-09

## 2020-01-09 PROCEDURE — G8482 FLU IMMUNIZE ORDER/ADMIN: HCPCS | Performed by: INTERNAL MEDICINE

## 2020-01-09 PROCEDURE — 99214 OFFICE O/P EST MOD 30 MIN: CPT | Performed by: INTERNAL MEDICINE

## 2020-01-09 PROCEDURE — 4040F PNEUMOC VAC/ADMIN/RCVD: CPT | Performed by: INTERNAL MEDICINE

## 2020-01-09 PROCEDURE — G8417 CALC BMI ABV UP PARAM F/U: HCPCS | Performed by: INTERNAL MEDICINE

## 2020-01-09 PROCEDURE — 1036F TOBACCO NON-USER: CPT | Performed by: INTERNAL MEDICINE

## 2020-01-09 PROCEDURE — 93000 ELECTROCARDIOGRAM COMPLETE: CPT | Performed by: INTERNAL MEDICINE

## 2020-01-09 PROCEDURE — 1123F ACP DISCUSS/DSCN MKR DOCD: CPT | Performed by: INTERNAL MEDICINE

## 2020-01-09 PROCEDURE — G8427 DOCREV CUR MEDS BY ELIG CLIN: HCPCS | Performed by: INTERNAL MEDICINE

## 2020-01-09 PROCEDURE — 3017F COLORECTAL CA SCREEN DOC REV: CPT | Performed by: INTERNAL MEDICINE

## 2020-01-09 NOTE — PROGRESS NOTES
Chief Complaint   Patient presents with    Atrial Fibrillation       Patient Active Problem List    Diagnosis Date Noted    Valvular heart disease 01/02/2020     Overview Note:     A. Echo 11/7/2019: mild to moderate MR, mild AI, EF 55%      Thoracic ascending aortic aneurysm ([address] 01/02/2020     Overview Note:     A. Echo 11/2019: 4.3 cm       Persistent atrial fibrillation 05/19/2018     Overview Note:     A. CHADS-VASC = 2  B .  DORINDA guided DC cardioversion 5/30/2018      Hypertension 07/23/2015    Hyperlipidemia 07/23/2015       Current Outpatient Medications   Medication Sig Dispense Refill    apixaban (ELIQUIS) 5 MG TABS tablet Take 1 tablet by mouth 2 times daily 180 tablet 3    atorvastatin (LIPITOR) 40 MG tablet Take 1 tablet by mouth daily 90 tablet 3    metoprolol tartrate (LOPRESSOR) 25 MG tablet Take 1 tablet by mouth 2 times daily 180 tablet 3    ramipril (ALTACE) 10 MG capsule Take 1 capsule by mouth 2 times daily 180 capsule 3    ACETAMINOPHEN PO Take 650 mg by mouth 2 times daily as needed       cetirizine (ZYRTEC) 5 MG tablet Take 5 mg by mouth daily.       pantoprazole (PROTONIX) 40 MG tablet TAKE 1 TABLET EVERY DAY (Patient not taking: Reported on 1/9/2020) 90 tablet 3     Current Facility-Administered Medications   Medication Dose Route Frequency Provider Last Rate Last Dose    perflutren lipid microspheres (DEFINITY) injection 1.65 mg  1.5 mL Intravenous ONCE PRN Dianelys Johansen MD            Allergies   Allergen Reactions    Pcn [Penicillins] Rash       Vitals:    01/09/20 0711   BP: 136/82   Pulse: 77   Resp: 16   Weight: 200 lb (90.7 kg)   Height: 5' 6\" (1.676 m)       Social History     Socioeconomic History    Marital status:      Spouse name: Not on file    Number of children: Not on file    Years of education: Not on file    Highest education level: Not on file   Occupational History     Employer: [employer]   Social Needs    Financial resource strain: Not on file   Maria T Ricardo Food insecurity:     Worry: Not on file     Inability: Not on file    Transportation needs:     Medical: Not on file     Non-medical: Not on file   Tobacco Use    Smoking status: Former Smoker     Packs/day: 0.25     Years: 0.50     Pack years: 0.12     Last attempt to quit: 1978     Years since quittin.6    Smokeless tobacco: Never Used   Substance and Sexual Activity    Alcohol use: Yes     Types: 3 Glasses of wine per week     Comment: occasionally    Drug use: No    Sexual activity: Not on file   Lifestyle    Physical activity:     Days per week: Not on file     Minutes per session: Not on file    Stress: Not on file   Relationships    Social connections:     Talks on phone: Not on file     Gets together: Not on file     Attends Mosque service: Not on file     Active member of club or organization: Not on file     Attends meetings of clubs or organizations: Not on file     Relationship status: Not on file    Intimate partner violence:     Fear of current or ex partner: Not on file     Emotionally abused: Not on file     Physically abused: Not on file     Forced sexual activity: Not on file   Other Topics Concern    Not on file   Social History Narrative    Not on file       Family History   Problem Relation Age of Onset    High Blood Pressure Father     Diabetes Brother          SUBJECTIVE: Betito Marquez presents to the office today for chronic cardiac diagnoses . He complains of no major symptosm  he denies   chest pain, chest pressure/discomfort, claudication, dyspnea, exertional chest pressure/discomfort, fatigue, irregular heart beat, lower extremity edema, near-syncope, orthopnea, palpitations, paroxysmal nocturnal dyspnea, syncope and tachypnea. NOW WALKING FOR EXERCISE  AND HAS LOST 25 LBS. No lbeeds, no neuro symps since eliquis. Review of Systems:   Heart: as above   Lungs: as above   Eyes: denies changes in vision or discharge.    Ears: denies changes in hearing or pain.   Nose: denies epistaxis or masses   Throat: denies sore throat or trouble swallowing. Neuro: denies numbness, tingling, tremors. Skin: denies rashes or itching. : denies hematuria, dysuria   GI: denies vomiting, diarrhea   Psych: denies mood changed, anxiety, depression. all others negative. Physical Exam   /82   Pulse 77   Resp 16   Ht 5' 6\" (1.676 m)   Wt 200 lb (90.7 kg)   BMI 32.28 kg/m²   Constitutional: Oriented to person, place, and time. Well-developed and well-nourished. No distress. Head: Normocephalic and atraumatic. Eyes: EOM are normal. Pupils are equal, round, and reactive to light. Neck: Normal range of motion. Neck supple. No hepatojugular reflux and no JVD present. Carotid bruit is not present. No tracheal deviation present. No thyromegaly present. Cardiovascular: Normal rate, regular rhythm, normal heart sounds and intact distal pulses. Exam reveals no gallop and no friction rub. Soft blowing murmur AI murmur heard in sitting position . Pulmonary/Chest: Effort normal and breath sounds normal. No respiratory distress. No wheezes. No rales. No tenderness. Abdominal: Soft. Bowel sounds are normal. No distension and no mass. No tenderness. No rebound and no guarding. Musculoskeletal: Normal range of motion. No edema and no tenderness. Lymphadenopathy:   No cervical adenopathy. No groin adenopathy. Neurological: Alert and oriented to person, place, and time. Skin: Skin is warm and dry. No rash noted. Not diaphoretic. No erythema. Psychiatric: Normal mood and affect. Behavior is normal.     EKG:  Nsr , normal     ASSESSMENT AND PLAN:  Patient Active Problem List   Diagnosis    Hypertension: at target    Hyperlipidemia: on high intensity statin     Persistent atrial fibrillation : DC cardioversion successful. Continue NOAC and BB.   * Aortic valve disease: mild by TTE, but audible today. Asymptomatic.   Will follow  Ascending thoracic aneurysm; Detected by TTE.   Need CT scan.will arrange       OV 6 months    Maximiliano Lynne M.D  [facility]

## 2020-01-15 ENCOUNTER — OFFICE VISIT (OUTPATIENT)
Dept: PRIMARY CARE CLINIC | Age: 70
End: 2020-01-15
Payer: MEDICARE

## 2020-01-15 VITALS
RESPIRATION RATE: 16 BRPM | BODY MASS INDEX: 32.14 KG/M2 | HEIGHT: 66 IN | DIASTOLIC BLOOD PRESSURE: 78 MMHG | TEMPERATURE: 97.1 F | OXYGEN SATURATION: 98 % | HEART RATE: 72 BPM | SYSTOLIC BLOOD PRESSURE: 124 MMHG | WEIGHT: 200 LBS

## 2020-01-15 PROCEDURE — G8427 DOCREV CUR MEDS BY ELIG CLIN: HCPCS | Performed by: FAMILY MEDICINE

## 2020-01-15 PROCEDURE — 1123F ACP DISCUSS/DSCN MKR DOCD: CPT | Performed by: FAMILY MEDICINE

## 2020-01-15 PROCEDURE — 3017F COLORECTAL CA SCREEN DOC REV: CPT | Performed by: FAMILY MEDICINE

## 2020-01-15 PROCEDURE — G8417 CALC BMI ABV UP PARAM F/U: HCPCS | Performed by: FAMILY MEDICINE

## 2020-01-15 PROCEDURE — 99213 OFFICE O/P EST LOW 20 MIN: CPT | Performed by: FAMILY MEDICINE

## 2020-01-15 PROCEDURE — G8482 FLU IMMUNIZE ORDER/ADMIN: HCPCS | Performed by: FAMILY MEDICINE

## 2020-01-15 PROCEDURE — 1036F TOBACCO NON-USER: CPT | Performed by: FAMILY MEDICINE

## 2020-01-15 PROCEDURE — 4040F PNEUMOC VAC/ADMIN/RCVD: CPT | Performed by: FAMILY MEDICINE

## 2020-01-15 RX ORDER — OSELTAMIVIR PHOSPHATE 75 MG/1
75 CAPSULE ORAL 2 TIMES DAILY
Qty: 10 CAPSULE | Refills: 0 | Status: SHIPPED | OUTPATIENT
Start: 2020-01-15 | End: 2020-01-20

## 2020-01-15 ASSESSMENT — ENCOUNTER SYMPTOMS
WHEEZING: 0
BLOOD IN STOOL: 0
PHOTOPHOBIA: 0
VOMITING: 0
ALLERGIC/IMMUNOLOGIC NEGATIVE: 1
SINUS PRESSURE: 0
NAUSEA: 0
CHEST TIGHTNESS: 0
COLOR CHANGE: 0
FACIAL SWELLING: 0
BACK PAIN: 0
DIARRHEA: 0
APNEA: 0
SORE THROAT: 1
ABDOMINAL PAIN: 0
COUGH: 0
SHORTNESS OF BREATH: 0

## 2020-01-15 ASSESSMENT — PATIENT HEALTH QUESTIONNAIRE - PHQ9
2. FEELING DOWN, DEPRESSED OR HOPELESS: 0
SUM OF ALL RESPONSES TO PHQ QUESTIONS 1-9: 0
SUM OF ALL RESPONSES TO PHQ QUESTIONS 1-9: 0
1. LITTLE INTEREST OR PLEASURE IN DOING THINGS: 0
SUM OF ALL RESPONSES TO PHQ9 QUESTIONS 1 & 2: 0

## 2020-01-15 NOTE — PROGRESS NOTES
Chief Complaint:   Chief Complaint   Patient presents with    Cough     dry cough/ x3days     Headache    Generalized Body Aches     x3days     Pharyngitis    Congestion     x3days        Cough   This is a new problem. The current episode started yesterday. The cough is non-productive. Associated symptoms include headaches and a sore throat. Pertinent negatives include no myalgias, rash, shortness of breath or wheezing. Generalized Body Aches   This is a new problem. The current episode started in the past 7 days. Associated symptoms include headaches and a sore throat. Pertinent negatives include no abdominal pain, arthralgias, congestion, coughing, joint swelling, myalgias, nausea, rash, vomiting or weakness. Patient Active Problem List   Diagnosis    Hypertension    Hyperlipidemia    Persistent atrial fibrillation    Valvular heart disease    Thoracic ascending aortic aneurysm [facility])       Past Medical History:   Diagnosis Date    Hyperlipidemia     Hypertension        Past Surgical History:   Procedure Laterality Date    CARDIOVERSION  05/30/2018    EYE SURGERY Left 05/2018    cataracts, retinal tear repair.  HERNIA REPAIR      KIDNEY REMOVAL Right     Half of kidney removed.      KIDNEY SURGERY      LITHOTRIPSY      [place] FRAGMENT KIDNEY STONE/ ESWL Right 10/25/2018    ESWL EXTRACORPEAL SHOCK WAVE LITHOTRIPSY WITH CYSTO AND RIGHT STENT PLACEMENT performed by Bashir Thomas MD at [place] [address] TRANSESOPHAGEAL ECHOCARDIOGRAM  05/30/2018       Current Outpatient Medications   Medication Sig Dispense Refill    oseltamivir (TAMIFLU) 75 MG capsule Take 1 capsule by mouth 2 times daily for 5 days 10 capsule 0    metoprolol tartrate (LOPRESSOR) 25 MG tablet Take 1 tablet by mouth 2 times daily 180 tablet 3    apixaban (ELIQUIS) 5 MG TABS tablet Take 1 tablet by mouth 2 times daily 180 tablet 3    atorvastatin (LIPITOR) 40 MG tablet Take 1 tablet by mouth daily 90 tablet 3    ramipril (ALTACE) 10 MG capsule Take 1 capsule by mouth 2 times daily 180 capsule 3    pantoprazole (PROTONIX) 40 MG tablet TAKE 1 TABLET EVERY DAY 90 tablet 3    ACETAMINOPHEN PO Take 650 mg by mouth 2 times daily as needed       cetirizine (ZYRTEC) 5 MG tablet Take 5 mg by mouth daily.        Current Facility-Administered Medications   Medication Dose Route Frequency Provider Last Rate Last Dose    perflutren lipid microspheres (DEFINITY) injection 1.65 mg  1.5 mL Intravenous ONCE PRN Dilcia Soriano MD           Allergies   Allergen Reactions    Pcn [Penicillins] Rash       Social History     Socioeconomic History    Marital status:      Spouse name: None    Number of children: None    Years of education: None    Highest education level: None   Occupational History     Employer: [employer]   Social Needs    Financial resource strain: None    Food insecurity:     Worry: None     Inability: None    Transportation needs:     Medical: None     Non-medical: None   Tobacco Use    Smoking status: Former Smoker     Packs/day: 0.25     Years: 0.50     Pack years: 0.12     Last attempt to quit: 1978     Years since quittin.6    Smokeless tobacco: Never Used   Substance and Sexual Activity    Alcohol use: Yes     Types: 3 Glasses of wine per week     Comment: occasionally    Drug use: No    Sexual activity: None   Lifestyle    Physical activity:     Days per week: None     Minutes per session: None    Stress: None   Relationships    Social connections:     Talks on phone: None     Gets together: None     Attends Episcopal service: None     Active member of club or organization: None     Attends meetings of clubs or organizations: None     Relationship status: None    Intimate partner violence:     Fear of current or ex partner: None     Emotionally abused: None     Physically abused: None     Forced sexual activity: None   Other Topics Concern    None   Social History Narrative    None       Family History   Problem Relation Age of Onset    High Blood Pressure Father     Diabetes Brother          Review of Systems   Constitutional: Negative. HENT: Positive for sore throat. Negative for congestion, facial swelling, hearing loss, nosebleeds and sinus pressure. Eyes: Negative for photophobia and visual disturbance. Respiratory: Negative for apnea, cough, chest tightness, shortness of breath and wheezing. Cardiovascular: Negative for palpitations and leg swelling. Gastrointestinal: Negative for abdominal pain, blood in stool, diarrhea, nausea and vomiting. Genitourinary: Negative for difficulty urinating, frequency and urgency. Musculoskeletal: Negative for arthralgias, back pain, joint swelling and myalgias. Skin: Negative for color change and rash. Allergic/Immunologic: Negative. Neurological: Positive for headaches. Negative for syncope, weakness and light-headedness. Hematological: Negative. Psychiatric/Behavioral: Negative for agitation, behavioral problems, confusion and self-injury. The patient is not nervous/anxious. All other systems reviewed and are negative. Physical Exam  Vitals signs and nursing note reviewed. Constitutional:       General: He is not in acute distress. Appearance: He is well-developed. HENT:      Head: Normocephalic and atraumatic. Nose: Congestion and rhinorrhea present. Eyes:      Conjunctiva/sclera: Conjunctivae normal.      Pupils: Pupils are equal, round, and reactive to light. Neck:      Musculoskeletal: Normal range of motion and neck supple. Thyroid: No thyromegaly. Vascular: No JVD. Cardiovascular:      Rate and Rhythm: Normal rate and regular rhythm. Heart sounds: Normal heart sounds. No murmur. No friction rub. No gallop. Pulmonary:      Effort: Pulmonary effort is normal. No respiratory distress. Breath sounds: Normal breath sounds. No wheezing.    Abdominal:      General: Bowel sounds are

## 2020-04-28 RX ORDER — ATORVASTATIN CALCIUM 40 MG/1
TABLET, FILM COATED ORAL
Qty: 90 TABLET | Refills: 3 | Status: SHIPPED
Start: 2020-04-28 | End: 2021-06-10 | Stop reason: SDUPTHER

## 2020-04-28 RX ORDER — RAMIPRIL 10 MG/1
CAPSULE ORAL
Qty: 180 CAPSULE | Refills: 3 | Status: SHIPPED
Start: 2020-04-28 | End: 2021-04-29 | Stop reason: SDUPTHER

## 2020-06-30 ENCOUNTER — OFFICE VISIT (OUTPATIENT)
Dept: CARDIOLOGY CLINIC | Age: 70
End: 2020-06-30
Payer: MEDICARE

## 2020-06-30 VITALS
HEIGHT: 66 IN | WEIGHT: 201.8 LBS | HEART RATE: 65 BPM | RESPIRATION RATE: 16 BRPM | BODY MASS INDEX: 32.43 KG/M2 | SYSTOLIC BLOOD PRESSURE: 128 MMHG | DIASTOLIC BLOOD PRESSURE: 78 MMHG

## 2020-06-30 PROCEDURE — G8427 DOCREV CUR MEDS BY ELIG CLIN: HCPCS | Performed by: INTERNAL MEDICINE

## 2020-06-30 PROCEDURE — 99214 OFFICE O/P EST MOD 30 MIN: CPT | Performed by: INTERNAL MEDICINE

## 2020-06-30 PROCEDURE — 3017F COLORECTAL CA SCREEN DOC REV: CPT | Performed by: INTERNAL MEDICINE

## 2020-06-30 PROCEDURE — 1123F ACP DISCUSS/DSCN MKR DOCD: CPT | Performed by: INTERNAL MEDICINE

## 2020-06-30 PROCEDURE — 93000 ELECTROCARDIOGRAM COMPLETE: CPT | Performed by: INTERNAL MEDICINE

## 2020-06-30 PROCEDURE — G8417 CALC BMI ABV UP PARAM F/U: HCPCS | Performed by: INTERNAL MEDICINE

## 2020-06-30 PROCEDURE — 1036F TOBACCO NON-USER: CPT | Performed by: INTERNAL MEDICINE

## 2020-06-30 PROCEDURE — 4040F PNEUMOC VAC/ADMIN/RCVD: CPT | Performed by: INTERNAL MEDICINE

## 2020-07-21 ENCOUNTER — TELEPHONE (OUTPATIENT)
Dept: ADMINISTRATIVE | Age: 70
End: 2020-07-21

## 2020-07-29 ENCOUNTER — HOSPITAL ENCOUNTER (OUTPATIENT)
Age: 70
Discharge: HOME | End: 2020-07-29
Payer: MEDICARE

## 2020-07-29 LAB
ANION GAP SERPL CALCULATED.3IONS-SCNC: 11 MMOL/L (ref 7–16)
BUN BLDV-MCNC: 21 MG/DL (ref 8–23)
CALCIUM SERPL-MCNC: 9.2 MG/DL (ref 8.6–10.2)
CHLORIDE BLD-SCNC: 102 MMOL/L (ref 98–107)
CO2: 26 MMOL/L (ref 22–29)
CREAT SERPL-MCNC: 1.3 MG/DL (ref 0.7–1.2)
GFR AFRICAN AMERICAN: >60
GFR NON-AFRICAN AMERICAN: 54 ML/MIN/1.73
GLUCOSE BLD-MCNC: 95 MG/DL (ref 74–99)
POTASSIUM SERPL-SCNC: 4.3 MMOL/L (ref 3.5–5)
SODIUM BLD-SCNC: 139 MMOL/L (ref 132–146)

## 2020-07-29 PROCEDURE — 80048 BASIC METABOLIC PNL TOTAL CA: CPT

## 2020-07-29 PROCEDURE — 36415 COLL VENOUS BLD VENIPUNCTURE: CPT

## 2020-07-30 ENCOUNTER — HOSPITAL ENCOUNTER (OUTPATIENT)
Dept: CT IMAGING | Age: 70
Discharge: HOME | End: 2020-08-01
Payer: MEDICARE

## 2020-07-30 ENCOUNTER — TELEPHONE (OUTPATIENT)
Dept: CARDIOLOGY CLINIC | Age: 70
End: 2020-07-30

## 2020-07-30 PROCEDURE — 71275 CT ANGIOGRAPHY CHEST: CPT

## 2020-07-30 PROCEDURE — 6360000004 HC RX CONTRAST MEDICATION: Performed by: RADIOLOGY

## 2020-07-30 RX ADMIN — IOPAMIDOL 80 ML: 755 INJECTION, SOLUTION INTRAVENOUS at 07:53

## 2020-10-07 ENCOUNTER — OFFICE VISIT (OUTPATIENT)
Dept: PRIMARY CARE CLINIC | Age: 70
End: 2020-10-07
Payer: MEDICARE

## 2020-10-07 VITALS
RESPIRATION RATE: 16 BRPM | OXYGEN SATURATION: 97 % | SYSTOLIC BLOOD PRESSURE: 126 MMHG | DIASTOLIC BLOOD PRESSURE: 74 MMHG | TEMPERATURE: 97.6 F | HEART RATE: 89 BPM | HEIGHT: 66 IN | BODY MASS INDEX: 33.11 KG/M2 | WEIGHT: 206 LBS

## 2020-10-07 PROCEDURE — 1036F TOBACCO NON-USER: CPT | Performed by: FAMILY MEDICINE

## 2020-10-07 PROCEDURE — 4040F PNEUMOC VAC/ADMIN/RCVD: CPT | Performed by: FAMILY MEDICINE

## 2020-10-07 PROCEDURE — 99213 OFFICE O/P EST LOW 20 MIN: CPT | Performed by: FAMILY MEDICINE

## 2020-10-07 PROCEDURE — G8427 DOCREV CUR MEDS BY ELIG CLIN: HCPCS | Performed by: FAMILY MEDICINE

## 2020-10-07 PROCEDURE — 3017F COLORECTAL CA SCREEN DOC REV: CPT | Performed by: FAMILY MEDICINE

## 2020-10-07 PROCEDURE — G8417 CALC BMI ABV UP PARAM F/U: HCPCS | Performed by: FAMILY MEDICINE

## 2020-10-07 PROCEDURE — 1123F ACP DISCUSS/DSCN MKR DOCD: CPT | Performed by: FAMILY MEDICINE

## 2020-10-07 PROCEDURE — G8484 FLU IMMUNIZE NO ADMIN: HCPCS | Performed by: FAMILY MEDICINE

## 2020-10-07 ASSESSMENT — ENCOUNTER SYMPTOMS
WHEEZING: 0
DIARRHEA: 0
BLOOD IN STOOL: 0
BACK PAIN: 0
FACIAL SWELLING: 0
ABDOMINAL PAIN: 0
COUGH: 0
CHEST TIGHTNESS: 0
SHORTNESS OF BREATH: 0
COLOR CHANGE: 0
ALLERGIC/IMMUNOLOGIC NEGATIVE: 1
SORE THROAT: 0
SINUS PRESSURE: 0
NAUSEA: 0
APNEA: 0
PHOTOPHOBIA: 0
VOMITING: 0

## 2020-10-07 NOTE — PROGRESS NOTES
Chief Complaint:   Chief Complaint   Patient presents with    Hand Pain     trigger finger L hand        Hand Pain    The incident occurred more than 1 week ago. There was no injury mechanism. The pain is at a severity of 4/10. The pain is moderate. Pertinent negatives include no chest pain. Patient Active Problem List   Diagnosis    Hypertension    Hyperlipidemia    Persistent atrial fibrillation    Valvular heart disease    Thoracic ascending aortic aneurysm [facility])       Past Medical History:   Diagnosis Date    Hyperlipidemia     Hypertension        Past Surgical History:   Procedure Laterality Date    CARDIOVERSION  05/30/2018    EYE SURGERY Left 05/2018    cataracts, retinal tear repair.  HERNIA REPAIR      KIDNEY REMOVAL Right     Half of kidney removed.  KIDNEY SURGERY      LITHOTRIPSY      [place] FRAGMENT KIDNEY STONE/ ESWL Right 10/25/2018    ESWL EXTRACORPEAL SHOCK WAVE LITHOTRIPSY WITH CYSTO AND RIGHT STENT PLACEMENT performed by Lindsey Burleson MD at [facility] TRANSESOPHAGEAL ECHOCARDIOGRAM  05/30/2018       Current Outpatient Medications   Medication Sig Dispense Refill    atorvastatin (LIPITOR) 40 MG tablet TAKE 1 TABLET DAILY 90 tablet 3    metoprolol tartrate (LOPRESSOR) 25 MG tablet TAKE 1 TABLET TWICE A  tablet 3    ramipril (ALTACE) 10 MG capsule TAKE 1 CAPSULE TWICE DAILY 180 capsule 3    apixaban (ELIQUIS) 5 MG TABS tablet Take 1 tablet by mouth 2 times daily 180 tablet 3    ACETAMINOPHEN PO Take 650 mg by mouth 2 times daily as needed       cetirizine (ZYRTEC) 5 MG tablet Take 5 mg by mouth daily.       pantoprazole (PROTONIX) 40 MG tablet TAKE 1 TABLET EVERY DAY 90 tablet 3     Current Facility-Administered Medications   Medication Dose Route Frequency Provider Last Rate Last Dose    perflutren lipid microspheres (DEFINITY) injection 1.65 mg  1.5 mL Intravenous ONCE PRN Evaristo Houston MD           Allergies   Allergen Reactions    Pcn [Penicillins] Rash       Social History     Socioeconomic History    Marital status:      Spouse name: None    Number of children: None    Years of education: None    Highest education level: None   Occupational History     Employer: [employer]   Social Needs    Financial resource strain: None    Food insecurity     Worry: None     Inability: None    Transportation needs     Medical: None     Non-medical: None   Tobacco Use    Smoking status: Former Smoker     Packs/day: 0.25     Years: 0.50     Pack years: 0.12     Last attempt to quit: 1978     Years since quittin.3    Smokeless tobacco: Never Used   Substance and Sexual Activity    Alcohol use: Yes     Types: 3 Glasses of wine per week     Comment: occasionally    Drug use: No    Sexual activity: None   Lifestyle    Physical activity     Days per week: None     Minutes per session: None    Stress: None   Relationships    Social connections     Talks on phone: None     Gets together: None     Attends Zoroastrianism service: None     Active member of club or organization: None     Attends meetings of clubs or organizations: None     Relationship status: None    Intimate partner violence     Fear of current or ex partner: None     Emotionally abused: None     Physically abused: None     Forced sexual activity: None   Other Topics Concern    None   Social History Narrative    None       Family History   Problem Relation Age of Onset    High Blood Pressure Father     Diabetes Brother          Review of Systems   Constitutional: Negative. HENT: Negative for congestion, facial swelling, hearing loss, nosebleeds, sinus pressure and sore throat. Eyes: Negative for photophobia and visual disturbance. Respiratory: Negative for apnea, cough, chest tightness, shortness of breath and wheezing. Cardiovascular: Negative for chest pain, palpitations and leg swelling.    Gastrointestinal: Negative for abdominal pain, blood in stool, diarrhea, nausea and vomiting. Genitourinary: Negative for difficulty urinating, frequency and urgency. Musculoskeletal: Positive for arthralgias. Negative for back pain, joint swelling and myalgias. Skin: Negative for color change and rash. Allergic/Immunologic: Negative. Neurological: Negative for syncope, weakness, light-headedness and headaches. Hematological: Negative. Psychiatric/Behavioral: Negative for agitation, behavioral problems, confusion and self-injury. The patient is not nervous/anxious. All other systems reviewed and are negative. Physical Exam  Vitals signs and nursing note reviewed. Constitutional:       General: He is not in acute distress. Appearance: He is well-developed. HENT:      Head: Normocephalic and atraumatic. Nose: Nose normal.   Eyes:      Conjunctiva/sclera: Conjunctivae normal.      Pupils: Pupils are equal, round, and reactive to light. Neck:      Musculoskeletal: Normal range of motion and neck supple. Thyroid: No thyromegaly. Vascular: No JVD. Cardiovascular:      Rate and Rhythm: Normal rate and regular rhythm. Heart sounds: Normal heart sounds. No murmur. No friction rub. No gallop. Pulmonary:      Effort: Pulmonary effort is normal. No respiratory distress. Breath sounds: Normal breath sounds. No wheezing. Abdominal:      General: Bowel sounds are normal. There is no distension. Palpations: Abdomen is soft. Tenderness: There is no abdominal tenderness. There is no guarding or rebound. Musculoskeletal:      Left hand: He exhibits decreased range of motion. Hands:    Lymphadenopathy:      Cervical: No cervical adenopathy. Skin:     General: Skin is warm and dry. Findings: No erythema or rash. Neurological:      Mental Status: He is alert and oriented to person, place, and time. Cranial Nerves: No cranial nerve deficit. Motor: No abnormal muscle tone.       Coordination: Coordination normal. Deep Tendon Reflexes: Reflexes are normal and symmetric. Psychiatric:         Behavior: Behavior normal.         Judgment: Judgment normal.                               ASSESSMENT/PLAN:    Salima Ochoa was seen today for hand pain.     Diagnoses and all orders for this visit:    Trigger index finger of left hand  -     Leandra Watt MD, Orthopaedics, [place] ([facility])            Charley Elmore DO    10/7/2020  8:56 AM

## 2020-11-27 ENCOUNTER — TELEPHONE (OUTPATIENT)
Dept: PRIMARY CARE CLINIC | Age: 70
End: 2020-11-27

## 2020-11-27 NOTE — TELEPHONE ENCOUNTER
Patient is requesting a new prescription for his medication pantoprazole 40 MG. His last refill has . He would like a 90 day script. He uses [facility]. Please follow up with patient.

## 2020-11-29 RX ORDER — PANTOPRAZOLE SODIUM 40 MG/1
TABLET, DELAYED RELEASE ORAL
Qty: 90 TABLET | Refills: 3 | Status: SHIPPED
Start: 2020-11-29 | End: 2021-02-18 | Stop reason: SDUPTHER

## 2020-12-27 ENCOUNTER — HOSPITAL ENCOUNTER (EMERGENCY)
Age: 70
Discharge: HOME | End: 2020-12-27
Attending: EMERGENCY MEDICINE
Payer: MEDICARE

## 2020-12-27 ENCOUNTER — APPOINTMENT (OUTPATIENT)
Dept: CT IMAGING | Age: 70
End: 2020-12-27
Payer: MEDICARE

## 2020-12-27 VITALS
OXYGEN SATURATION: 97 % | DIASTOLIC BLOOD PRESSURE: 88 MMHG | SYSTOLIC BLOOD PRESSURE: 178 MMHG | TEMPERATURE: 98.1 F | RESPIRATION RATE: 17 BRPM | HEART RATE: 78 BPM

## 2020-12-27 LAB
ANION GAP SERPL CALCULATED.3IONS-SCNC: 6 MMOL/L (ref 7–16)
APTT: 34.7 SEC (ref 24.5–35.1)
BASOPHILS ABSOLUTE: 0.04 E9/L (ref 0–0.2)
BASOPHILS RELATIVE PERCENT: 0.6 % (ref 0–2)
BUN BLDV-MCNC: 19 MG/DL (ref 8–23)
CALCIUM SERPL-MCNC: 9.2 MG/DL (ref 8.6–10.2)
CHLORIDE BLD-SCNC: 104 MMOL/L (ref 98–107)
CO2: 29 MMOL/L (ref 22–29)
CREAT SERPL-MCNC: 1 MG/DL (ref 0.7–1.2)
EKG ATRIAL RATE: 69 BPM
EKG P AXIS: 66 DEGREES
EKG P-R INTERVAL: 206 MS
EKG Q-T INTERVAL: 412 MS
EKG QRS DURATION: 94 MS
EKG QTC CALCULATION (BAZETT): 441 MS
EKG R AXIS: -25 DEGREES
EKG T AXIS: 17 DEGREES
EKG VENTRICULAR RATE: 69 BPM
EOSINOPHILS ABSOLUTE: 0.11 E9/L (ref 0.05–0.5)
EOSINOPHILS RELATIVE PERCENT: 1.6 % (ref 0–6)
GFR AFRICAN AMERICAN: >60
GFR NON-AFRICAN AMERICAN: >60 ML/MIN/1.73
GLUCOSE BLD-MCNC: 105 MG/DL (ref 74–99)
HCT VFR BLD CALC: 40 % (ref 37–54)
HEMOGLOBIN: 12.6 G/DL (ref 12.5–16.5)
IMMATURE GRANULOCYTES #: 0.01 E9/L
IMMATURE GRANULOCYTES %: 0.1 % (ref 0–5)
INR BLD: 1.2
LYMPHOCYTES ABSOLUTE: 1.85 E9/L (ref 1.5–4)
LYMPHOCYTES RELATIVE PERCENT: 27 % (ref 20–42)
MCH RBC QN AUTO: 27.1 PG (ref 26–35)
MCHC RBC AUTO-ENTMCNC: 31.5 % (ref 32–34.5)
MCV RBC AUTO: 86 FL (ref 80–99.9)
MONOCYTES ABSOLUTE: 0.79 E9/L (ref 0.1–0.95)
MONOCYTES RELATIVE PERCENT: 11.5 % (ref 2–12)
NEUTROPHILS ABSOLUTE: 4.05 E9/L (ref 1.8–7.3)
NEUTROPHILS RELATIVE PERCENT: 59.2 % (ref 43–80)
PDW BLD-RTO: 14 FL (ref 11.5–15)
PLATELET # BLD: 176 E9/L (ref 130–450)
PMV BLD AUTO: 11 FL (ref 7–12)
POTASSIUM REFLEX MAGNESIUM: 4.7 MMOL/L (ref 3.5–5)
PROTHROMBIN TIME: 14.4 SEC (ref 9.3–12.4)
RBC # BLD: 4.65 E12/L (ref 3.8–5.8)
SODIUM BLD-SCNC: 139 MMOL/L (ref 132–146)
TROPONIN: <0.01 NG/ML (ref 0–0.03)
WBC # BLD: 6.9 E9/L (ref 4.5–11.5)

## 2020-12-27 PROCEDURE — 85610 PROTHROMBIN TIME: CPT

## 2020-12-27 PROCEDURE — 85730 THROMBOPLASTIN TIME PARTIAL: CPT

## 2020-12-27 PROCEDURE — 80048 BASIC METABOLIC PNL TOTAL CA: CPT

## 2020-12-27 PROCEDURE — 85025 COMPLETE CBC W/AUTO DIFF WBC: CPT

## 2020-12-27 PROCEDURE — 84484 ASSAY OF TROPONIN QUANT: CPT

## 2020-12-27 PROCEDURE — 93010 ELECTROCARDIOGRAM REPORT: CPT | Performed by: INTERNAL MEDICINE

## 2020-12-27 PROCEDURE — 99283 EMERGENCY DEPT VISIT LOW MDM: CPT

## 2020-12-27 PROCEDURE — 93005 ELECTROCARDIOGRAM TRACING: CPT | Performed by: NURSE PRACTITIONER

## 2020-12-27 RX ORDER — TETRACAINE HYDROCHLORIDE 5 MG/ML
2 SOLUTION OPHTHALMIC ONCE
Status: DISCONTINUED | OUTPATIENT
Start: 2020-12-27 | End: 2020-12-27

## 2020-12-27 NOTE — ED NOTES
Patient given visual acuity screening. Right eye was 20/25,  Left eye 20/50. States he feels like there is a film over that eye.        Josiah Miguel RN  12/27/20 [id number]

## 2020-12-27 NOTE — ED PROVIDER NOTES
Surgical History:  has a past surgical history that includes Kidney removal (Right); hernia repair; Kidney surgery; eye surgery (Left, 05/2018); transesophageal echocardiogram (05/30/2018); Cardioversion (05/30/2018); pr fragment kidney stone/ eswl (Right, 10/25/2018); and Lithotripsy. Social History:  reports that he quit smoking about 42 years ago. He has a 0.13 pack-year smoking history. He has never used smokeless tobacco. He reports current alcohol use. He reports that he does not use drugs. Family History: family history includes Diabetes in his brother; High Blood Pressure in his father. Allergies: Pcn [penicillins]    Physical Exam   Oxygen Saturation Interpretation: Normal.        ED Triage Vitals [12/27/20 1316]   BP Temp Temp Source Pulse Resp SpO2 Height Weight   (!) 202/153 97.9 °F (36.6 °C) Oral 75 16 97 % -- --         Constitutional:  Alert, development consistent with age. HENT:  NC/NT. Airway patent. Neck:  Normal ROM. Supple. Respiratory: Chest expansion is symmetrical. Lung sounds are clear  Cardiac: irregular rate  Eyes:         Pupils: equal, round, reactive to light and accommodation. Eyelids: Left upper and lower Swelling/redness:  None. Conjunctiva: Left normal.          Sclera: Left non-icteric . Cornea: Left normal.       EOM:  Intact Bilaterally. Fundoscopic:  not well visualized       Visual Acuity:  Right eye was 20/25,  Left eye 20/50  Integument:  No rashes, erythema present, unless noted elsewhere. Lymphatics: No lymphangitis or adenopathy noted. Neurological:  Oriented. Motor functions intact.     Lab / Imaging Results   (All laboratory and radiology results have been personally reviewed by myself)  Labs:  Results for orders placed or performed during the hospital encounter of 12/27/20   CBC Auto Differential   Result Value Ref Range    WBC 6.9 4.5 - 11.5 E9/L    RBC 4.65 3.80 - 5.80 E12/L    Hemoglobin 12.6 12.5 - 16.5 g/dL 12/27/20       Time: 1450-reviewed all results with patient and follow-up with ophthalmologist.  He states verbal understanding and will call them first in the morning for 2 be further evaluated tomorrow. He states he is able to see better but continues to feel like there is a screen on his central vision on the left eye  Patients condition is improving. Consult(s):   IP CONSULT TO OPHTHALMOLOGY  1420- Spoke with Dr. Epi Jessica who covers for Dr. Darcie Pal. He feels it is a Posterior Vitreous separtion due to description, results and patient's age. He feel that is unlikely hemorrhage due to not being diabetic. He states he will see him in the office tomorrow to dilate his eye. Procedure(s):   1350- Intraocular pressures    Right 24 mmHg    Left 23 mmHg     1402-Dr. Lindo at bedside for ultrasound of the left eye. Mixed density noted optic nerve was evaluated. MDM:   Patient presents to the ED for vision changes on the left eye. Differential diagnoses included but not limited to retinal tear versus detachment versus hemorrhage. Workup in the ED revealed CBC was unremarkable. BMP was unremarkable. INR was 1.2. Troponin was less than 0.01. EKG was A. fib. Ultrasound was completed by ER attending revealed mixed density. Intracranial pressures were within normal limits. Consulted ophthalmologist and states they will see him tomorrow in the office to confirm posterior vitreous separation with dilating his eye. There is no neurovascular deficits or compromise. Patient continues to be non-toxic on re-evaluation. Findings were discussed with the patient and reasons to immediately return to the ED were articulated to them.  They will follow-up with their PMD and ophthalmologist. Plan of Care/Counseling:  I reviewed today's visit with the patient in addition to providing specific details for the plan of care and counseling regarding the diagnosis and prognosis. Questions are answered at this time and are agreeable with the plan. Assessment      1. Posterior vitreous detachment of left eye    2. Vision changes      Plan   Discharge to home. Patient condition is stable    New Medications     Discharge Medication List as of 12/27/2020  4:23 PM        Electronically signed by SO Wall CNP   DD: 12/27/20  **This report was transcribed using voice recognition software. Every effort was made to ensure accuracy; however, inadvertent computerized transcription errors may be present.   END OF ED PROVIDER NOTE       SO Wall CNP  12/27/20 5904

## 2020-12-27 NOTE — ED NOTES
FIRST PROVIDER CONTACT ASSESSMENT NOTE      Department of Emergency Medicine   12/27/20  1:19 PM EST    Chief Complaint: Loss of Vision (sudden left eye vision loss x 2 hours. now having floaters and halo. HX retinal tear and MD hole in the eye )      History of Present Illness:   Iona Rose is a 65 y.o. male who presents to the ED for a bright flash of the left eye and floaters. He states he can see peripheral but he sees floaters when looking straight forward. He denies any other symptoms. He denies any lightheadedness, dizziness, slurred speech, shortness breath, chest pain, extremity weakness. Medical History:  has a past medical history of Hyperlipidemia and Hypertension. Surgical History:  has a past surgical history that includes Kidney removal (Right); hernia repair; Kidney surgery; eye surgery (Left, 05/2018); transesophageal echocardiogram (05/30/2018); Cardioversion (05/30/2018); pr fragment kidney stone/ eswl (Right, 10/25/2018); and Lithotripsy. Social History:  reports that he quit smoking about 42 years ago. He has a 0.13 pack-year smoking history. He has never used smokeless tobacco. He reports current alcohol use. He reports that he does not use drugs. Family History: family history includes Diabetes in his brother; High Blood Pressure in his father. *ALLERGIES*     Pcn [penicillins]     Physical Exam:      VS:  BP (!) 202/153   Pulse 75   Temp 97.9 °F (36.6 °C) (Oral)   Resp 16   SpO2 97%      Initial Plan of Care:  Discussed with Dr. Edmond Vargas appropriate CT scan.   Initiate Treatment-Testing, Proceed toTreatment Area When Bed Available for ED Attending/MLP to Continue Care    -----------------END OF FIRST PROVIDER CONTACT ASSESSMENT NOTE--------------  Electronically signed by SO Ortiz CNP   DD: 12/27/20             SO Ortiz CNP  12/27/20 8641

## 2020-12-27 NOTE — ED NOTES
Name: Rafael Moyer  : 1950  MRN: [id number]    Date: 2020    Benefits of immediately proceeding with Radiology exam outweigh the risks and therefore the following is being waived:      [] Pregnancy test    [] Protocol for Iodine allergy    [] MRI questionnaire    [x] BUN/Creatinine        Mennie Sandifer, DO Mennie Sandifer, DO  20 [phone]

## 2020-12-27 NOTE — ED NOTES
Bed: 18  Expected date:   Expected time:   Means of arrival:   Comments:     Ghassan Dodge RN  12/27/20 3205

## 2021-01-11 ENCOUNTER — OFFICE VISIT (OUTPATIENT)
Dept: CARDIOLOGY CLINIC | Age: 71
End: 2021-01-11
Payer: MEDICARE

## 2021-01-11 VITALS
RESPIRATION RATE: 18 BRPM | WEIGHT: 211 LBS | SYSTOLIC BLOOD PRESSURE: 150 MMHG | HEIGHT: 66 IN | DIASTOLIC BLOOD PRESSURE: 100 MMHG | HEART RATE: 63 BPM | BODY MASS INDEX: 33.91 KG/M2

## 2021-01-11 DIAGNOSIS — I71.21 THORACIC ASCENDING AORTIC ANEURYSM: ICD-10-CM

## 2021-01-11 DIAGNOSIS — I48.19 PERSISTENT ATRIAL FIBRILLATION (HCC): Primary | ICD-10-CM

## 2021-01-11 PROCEDURE — 1036F TOBACCO NON-USER: CPT | Performed by: INTERNAL MEDICINE

## 2021-01-11 PROCEDURE — G8484 FLU IMMUNIZE NO ADMIN: HCPCS | Performed by: INTERNAL MEDICINE

## 2021-01-11 PROCEDURE — 93000 ELECTROCARDIOGRAM COMPLETE: CPT | Performed by: INTERNAL MEDICINE

## 2021-01-11 PROCEDURE — G8427 DOCREV CUR MEDS BY ELIG CLIN: HCPCS | Performed by: INTERNAL MEDICINE

## 2021-01-11 PROCEDURE — 4040F PNEUMOC VAC/ADMIN/RCVD: CPT | Performed by: INTERNAL MEDICINE

## 2021-01-11 PROCEDURE — 3017F COLORECTAL CA SCREEN DOC REV: CPT | Performed by: INTERNAL MEDICINE

## 2021-01-11 PROCEDURE — G8417 CALC BMI ABV UP PARAM F/U: HCPCS | Performed by: INTERNAL MEDICINE

## 2021-01-11 PROCEDURE — 1123F ACP DISCUSS/DSCN MKR DOCD: CPT | Performed by: INTERNAL MEDICINE

## 2021-01-11 PROCEDURE — 99214 OFFICE O/P EST MOD 30 MIN: CPT | Performed by: INTERNAL MEDICINE

## 2021-01-11 NOTE — PROGRESS NOTES
Chief Complaint   Patient presents with    Atrial Fibrillation    Hypertension       Patient Active Problem List    Diagnosis Date Noted    Valvular heart disease 01/02/2020     Overview Note:     A. Echo 11/7/2019: mild to moderate MR, mild AI, EF 55%      Thoracic ascending aortic aneurysm ([address] 01/02/2020     Overview Note:     A. Echo 11/2019: 4.3 cm   B. CT scan 7/2020: 4.3 cm       Persistent atrial fibrillation 05/19/2018     Overview Note:     A. CHADS-VASC = 2  B .  DORINDA guided DC cardioversion 5/30/2018      Hypertension 07/23/2015    Hyperlipidemia 07/23/2015       Current Outpatient Medications   Medication Sig Dispense Refill    pantoprazole (PROTONIX) 40 MG tablet TAKE 1 TABLET EVERY DAY 90 tablet 3    atorvastatin (LIPITOR) 40 MG tablet TAKE 1 TABLET DAILY 90 tablet 3    metoprolol tartrate (LOPRESSOR) 25 MG tablet TAKE 1 TABLET TWICE A  tablet 3    ramipril (ALTACE) 10 MG capsule TAKE 1 CAPSULE TWICE DAILY 180 capsule 3    apixaban (ELIQUIS) 5 MG TABS tablet Take 1 tablet by mouth 2 times daily 180 tablet 3    ACETAMINOPHEN PO Take 650 mg by mouth 2 times daily as needed       cetirizine (ZYRTEC) 5 MG tablet Take 5 mg by mouth daily.        Current Facility-Administered Medications   Medication Dose Route Frequency Provider Last Rate Last Admin    perflutren lipid microspheres (DEFINITY) injection 1.65 mg  1.5 mL Intravenous ONCE PRN Janae Johnson MD            Allergies   Allergen Reactions    Pcn [Penicillins] Rash       Vitals:    01/11/21 0726   BP: (!) 150/100   Pulse: 63   Resp: 18   Weight: 211 lb (95.7 kg)   Height: 5' 6\" (1.676 m)       Social History     Socioeconomic History    Marital status:      Spouse name: Not on file    Number of children: Not on file    Years of education: Not on file    Highest education level: Not on file   Occupational History     Employer: [employer]   Social Needs    Financial resource strain: Not on file    Food insecurity Worry: Not on file     Inability: Not on file    Transportation needs     Medical: Not on file     Non-medical: Not on file   Tobacco Use    Smoking status: Former Smoker     Packs/day: 0.25     Years: 0.50     Pack years: 0.12     Quit date: 1978     Years since quittin.6    Smokeless tobacco: Never Used   Substance and Sexual Activity    Alcohol use: Yes     Types: 3 Glasses of wine per week     Comment: occasionally    Drug use: No    Sexual activity: Not on file   Lifestyle    Physical activity     Days per week: Not on file     Minutes per session: Not on file    Stress: Not on file   Relationships    Social connections     Talks on phone: Not on file     Gets together: Not on file     Attends [employer] service: Not on file     Active member of club or organization: Not on file     Attends meetings of clubs or organizations: Not on file     Relationship status: Not on file    Intimate partner violence     Fear of current or ex partner: Not on file     Emotionally abused: Not on file     Physically abused: Not on file     Forced sexual activity: Not on file   Other Topics Concern    Not on file   Social History Narrative    Not on file       Family History   Problem Relation Age of Onset    High Blood Pressure Father     Diabetes Brother          SUBJECTIVE: John Mccarty presents to the office today for chronic cardiac diagnoses . He complains of no major symptosm  he denies   chest pain, chest pressure/discomfort, claudication, dyspnea, exertional chest pressure/discomfort, fatigue, irregular heart beat, lower extremity edema, near-syncope, orthopnea, palpitations, paroxysmal nocturnal dyspnea, syncope and tachypnea. since lost his job in October he is NO LONGER WALKING FOR EXERCISE  AND HAS GAINED BACK 10 25 LBS. No bleeds, no neuro symps since eliquis.  Claims home BPs 140/80    Review of Systems:   Heart: as above   Lungs: as above   Eyes: denies changes in vision or discharge. Ears: denies changes in hearing or pain. Nose: denies epistaxis or masses   Throat: denies sore throat or trouble swallowing. Neuro: denies numbness, tingling, tremors. Skin: denies rashes or itching. : denies hematuria, dysuria   GI: denies vomiting, diarrhea   Psych: denies mood changed, anxiety, depression. all others negative. Physical Exam   BP (!) 150/100   Pulse 63   Resp 18   Ht 5' 6\" (1.676 m)   Wt 211 lb (95.7 kg)   BMI 34.06 kg/m²   Constitutional: Oriented to person, place, and time. Well-developed and well-nourished. No distress. Head: Normocephalic and atraumatic. Eyes: EOM are normal. Pupils are equal, round, and reactive to light. Neck: Normal range of motion. Neck supple. No hepatojugular reflux and no JVD present. Carotid bruit is not present. No tracheal deviation present. No thyromegaly present. Cardiovascular: Normal rate, regular rhythm, normal heart sounds and intact distal pulses. Exam reveals no gallop and no friction rub. Soft blowing murmur AI murmur heard in sitting position . No MR murmur heard  Pulmonary/Chest: Effort normal and breath sounds normal. No respiratory distress. No wheezes. No rales. No tenderness. Abdominal: Soft. Bowel sounds are normal. No distension and no mass. No tenderness. No rebound and no guarding. Musculoskeletal: Normal range of motion. No edema and no tenderness. Neurological: Alert and oriented to person, place, and time. Skin: Skin is warm and dry. No rash noted. Not diaphoretic. No erythema. Psychiatric: Normal mood and affect. Behavior is normal.     EKG:  NSR, left axis deviation, remote septal MI pattern     ASSESSMENT AND PLAN:  Patient Active Problem List   Diagnosis    Hypertension: at target on two agents by home readings,, but I have asked him to up the frequency of sampling. May need addition of thiazide . Discussed home BP sampling technique as well.      Hyperlipidemia: on high intensity statin .  Paroxysmal atrial fibrillation : DC cardioversion successful 2018  . Continue NOAC and BB.     * Valve disease, Stage B:  AI mild by TTE in 2019 , but audible today. No MR heard. Asymptomatic. Will follow  Ascending thoracic aneurysm:  4.3 cm by CT scan 7/2020.         OV 6 months    Yeni Kasper M.D  [facility]

## 2021-01-28 ASSESSMENT — LIFESTYLE VARIABLES
HOW OFTEN DO YOU HAVE A DRINK CONTAINING ALCOHOL: 2
HOW OFTEN DURING THE LAST YEAR HAVE YOU FAILED TO DO WHAT WAS NORMALLY EXPECTED FROM YOU BECAUSE OF DRINKING: NEVER
AUDIT-C TOTAL SCORE: 2
HOW OFTEN DO YOU HAVE A DRINK CONTAINING ALCOHOL: TWO TO FOUR TIMES A MONTH
HAS A RELATIVE, FRIEND, DOCTOR, OR ANOTHER HEALTH PROFESSIONAL EXPRESSED CONCERN ABOUT YOUR DRINKING OR SUGGESTED YOU CUT DOWN: NO
HAVE YOU OR SOMEONE ELSE BEEN INJURED AS A RESULT OF YOUR DRINKING: 0
HOW OFTEN DURING THE LAST YEAR HAVE YOU BEEN UNABLE TO REMEMBER WHAT HAPPENED THE NIGHT BEFORE BECAUSE YOU HAD BEEN DRINKING: NEVER
AUDIT TOTAL SCORE: 0
HAVE YOU OR SOMEONE ELSE BEEN INJURED AS A RESULT OF YOUR DRINKING: NO
AUDIT TOTAL SCORE: 2
AUDIT-C TOTAL SCORE: 0
HOW OFTEN DO YOU HAVE SIX OR MORE DRINKS ON ONE OCCASION: NEVER
HOW OFTEN DURING THE LAST YEAR HAVE YOU FOUND THAT YOU WERE NOT ABLE TO STOP DRINKING ONCE YOU HAD STARTED: NEVER
HOW OFTEN DURING THE LAST YEAR HAVE YOU HAD A FEELING OF GUILT OR REMORSE AFTER DRINKING: 0
HOW OFTEN DURING THE LAST YEAR HAVE YOU HAD A FEELING OF GUILT OR REMORSE AFTER DRINKING: NEVER
HOW OFTEN DURING THE LAST YEAR HAVE YOU FOUND THAT YOU WERE NOT ABLE TO STOP DRINKING ONCE YOU HAD STARTED: 0
HOW OFTEN DURING THE LAST YEAR HAVE YOU NEEDED AN ALCOHOLIC DRINK FIRST THING IN THE MORNING TO GET YOURSELF GOING AFTER A NIGHT OF HEAVY DRINKING: NEVER
HOW MANY STANDARD DRINKS CONTAINING ALCOHOL DO YOU HAVE ON A TYPICAL DAY: ONE OR TWO

## 2021-01-28 ASSESSMENT — PATIENT HEALTH QUESTIONNAIRE - PHQ9
SUM OF ALL RESPONSES TO PHQ QUESTIONS 1-9: 0
SUM OF ALL RESPONSES TO PHQ QUESTIONS 1-9: 0
SUM OF ALL RESPONSES TO PHQ9 QUESTIONS 1 & 2: 0

## 2021-02-01 ENCOUNTER — OFFICE VISIT (OUTPATIENT)
Dept: PRIMARY CARE CLINIC | Age: 71
End: 2021-02-01
Payer: MEDICARE

## 2021-02-01 VITALS
SYSTOLIC BLOOD PRESSURE: 130 MMHG | DIASTOLIC BLOOD PRESSURE: 80 MMHG | OXYGEN SATURATION: 98 % | BODY MASS INDEX: 34.01 KG/M2 | RESPIRATION RATE: 16 BRPM | TEMPERATURE: 97.2 F | WEIGHT: 211.6 LBS | HEART RATE: 74 BPM | HEIGHT: 66 IN

## 2021-02-01 DIAGNOSIS — E78.5 HYPERLIPIDEMIA, UNSPECIFIED HYPERLIPIDEMIA TYPE: Primary | ICD-10-CM

## 2021-02-01 DIAGNOSIS — K62.5 RECTAL BLEED: ICD-10-CM

## 2021-02-01 DIAGNOSIS — Z12.5 PROSTATE CANCER SCREENING: ICD-10-CM

## 2021-02-01 DIAGNOSIS — I10 ESSENTIAL HYPERTENSION: ICD-10-CM

## 2021-02-01 DIAGNOSIS — I48.19 PERSISTENT ATRIAL FIBRILLATION (HCC): ICD-10-CM

## 2021-02-01 DIAGNOSIS — Z00.00 ROUTINE GENERAL MEDICAL EXAMINATION AT A HEALTH CARE FACILITY: ICD-10-CM

## 2021-02-01 DIAGNOSIS — E78.5 HYPERLIPIDEMIA, UNSPECIFIED HYPERLIPIDEMIA TYPE: ICD-10-CM

## 2021-02-01 LAB
ALBUMIN SERPL-MCNC: 4.1 G/DL (ref 3.5–5.2)
ALP BLD-CCNC: 55 U/L (ref 40–129)
ALT SERPL-CCNC: 18 U/L (ref 0–40)
ANION GAP SERPL CALCULATED.3IONS-SCNC: 9 MMOL/L (ref 7–16)
AST SERPL-CCNC: 26 U/L (ref 0–39)
BASOPHILS ABSOLUTE: 0.02 E9/L (ref 0–0.2)
BASOPHILS RELATIVE PERCENT: 0.4 % (ref 0–2)
BILIRUB SERPL-MCNC: 1.5 MG/DL (ref 0–1.2)
BUN BLDV-MCNC: 19 MG/DL (ref 8–23)
CALCIUM SERPL-MCNC: 9 MG/DL (ref 8.6–10.2)
CHLORIDE BLD-SCNC: 102 MMOL/L (ref 98–107)
CHOLESTEROL, TOTAL: 150 MG/DL (ref 0–199)
CO2: 27 MMOL/L (ref 22–29)
CREAT SERPL-MCNC: 1.2 MG/DL (ref 0.7–1.2)
EOSINOPHILS ABSOLUTE: 0.1 E9/L (ref 0.05–0.5)
EOSINOPHILS RELATIVE PERCENT: 1.8 % (ref 0–6)
GFR AFRICAN AMERICAN: >60
GFR NON-AFRICAN AMERICAN: 59 ML/MIN/1.73
GLUCOSE BLD-MCNC: 88 MG/DL (ref 74–99)
HCT VFR BLD CALC: 37.4 % (ref 37–54)
HDLC SERPL-MCNC: 64 MG/DL
HEMOGLOBIN: 11.7 G/DL (ref 12.5–16.5)
IMMATURE GRANULOCYTES #: 0.02 E9/L
IMMATURE GRANULOCYTES %: 0.4 % (ref 0–5)
LDL CHOLESTEROL CALCULATED: 64 MG/DL (ref 0–99)
LYMPHOCYTES ABSOLUTE: 1.81 E9/L (ref 1.5–4)
LYMPHOCYTES RELATIVE PERCENT: 32 % (ref 20–42)
MCH RBC QN AUTO: 26.4 PG (ref 26–35)
MCHC RBC AUTO-ENTMCNC: 31.3 % (ref 32–34.5)
MCV RBC AUTO: 84.2 FL (ref 80–99.9)
MONOCYTES ABSOLUTE: 0.75 E9/L (ref 0.1–0.95)
MONOCYTES RELATIVE PERCENT: 13.3 % (ref 2–12)
NEUTROPHILS ABSOLUTE: 2.95 E9/L (ref 1.8–7.3)
NEUTROPHILS RELATIVE PERCENT: 52.1 % (ref 43–80)
PDW BLD-RTO: 13.3 FL (ref 11.5–15)
PLATELET # BLD: 187 E9/L (ref 130–450)
PMV BLD AUTO: 11.3 FL (ref 7–12)
POTASSIUM SERPL-SCNC: 4.4 MMOL/L (ref 3.5–5)
PROSTATE SPECIFIC ANTIGEN: 1.66 NG/ML (ref 0–4)
RBC # BLD: 4.44 E12/L (ref 3.8–5.8)
SODIUM BLD-SCNC: 138 MMOL/L (ref 132–146)
TOTAL PROTEIN: 7.1 G/DL (ref 6.4–8.3)
TRIGL SERPL-MCNC: 112 MG/DL (ref 0–149)
TSH SERPL DL<=0.05 MIU/L-ACNC: 1.71 UIU/ML (ref 0.27–4.2)
VLDLC SERPL CALC-MCNC: 22 MG/DL
WBC # BLD: 5.7 E9/L (ref 4.5–11.5)

## 2021-02-01 PROCEDURE — G0439 PPPS, SUBSEQ VISIT: HCPCS | Performed by: FAMILY MEDICINE

## 2021-02-01 PROCEDURE — 3017F COLORECTAL CA SCREEN DOC REV: CPT | Performed by: FAMILY MEDICINE

## 2021-02-01 PROCEDURE — 1123F ACP DISCUSS/DSCN MKR DOCD: CPT | Performed by: FAMILY MEDICINE

## 2021-02-01 PROCEDURE — 4040F PNEUMOC VAC/ADMIN/RCVD: CPT | Performed by: FAMILY MEDICINE

## 2021-02-01 PROCEDURE — G8484 FLU IMMUNIZE NO ADMIN: HCPCS | Performed by: FAMILY MEDICINE

## 2021-02-01 NOTE — PROGRESS NOTES
Medicare Annual Wellness Visit  Name: Zbigniew Burt Date: 2021   MRN: [id number] Sex: Male   Age: 66 y.o. Ethnicity: Non-/Non    : 1950 Race: White      Roland GROSS Theresa Oliver is here for Medicare AWV    Screenings for behavioral, psychosocial and functional/safety risks, and cognitive dysfunction are all negative except as indicated below. These results, as well as other patient data from the [address] form, are documented in Flowsheets linked to this Encounter. Allergies   Allergen Reactions    Pcn [Penicillins] Rash         Prior to Visit Medications    Medication Sig Taking? Authorizing Provider   pantoprazole (PROTONIX) 40 MG tablet TAKE 1 TABLET EVERY DAY Yes Kandis Hayden,    atorvastatin (LIPITOR) 40 MG tablet TAKE 1 TABLET DAILY Yes Kandis Hayden,    metoprolol tartrate (LOPRESSOR) 25 MG tablet TAKE 1 TABLET TWICE A DAY Yes Kandis Hayden DO   ramipril (ALTACE) 10 MG capsule TAKE 1 CAPSULE TWICE DAILY Yes Kandis Hayden DO   apixaban (ELIQUIS) 5 MG TABS tablet Take 1 tablet by mouth 2 times daily Yes Zakia Avelar MD   ACETAMINOPHEN PO Take 650 mg by mouth 2 times daily as needed  Yes Historical Provider, MD   cetirizine (ZYRTEC) 5 MG tablet Take 5 mg by mouth daily. Yes Historical Provider, MD         Past Medical History:   Diagnosis Date    Hyperlipidemia     Hypertension        Past Surgical History:   Procedure Laterality Date    CARDIOVERSION  2018    EYE SURGERY Left 2018    cataracts, retinal tear repair.  HERNIA REPAIR      KIDNEY REMOVAL Right     Half of kidney removed.      KIDNEY SURGERY      LITHOTRIPSY      NC FRAGMENT KIDNEY STONE/ ESWL Right 10/25/2018    ESWL EXTRACORPEAL SHOCK WAVE LITHOTRIPSY WITH CYSTO AND RIGHT STENT PLACEMENT performed [facility]il [facility] MD at [facility] TRANSESOPHAGEAL ECHOCARDIOGRAM  2018         Family History   Problem Relation Age of Onset    High Blood Pressure Father     Diabetes Brother        CareTeam (Including outside providers/suppliers regularly involved in providing care):   Patient Care Team:  Silva Ruiz DO as PCP - General  Silva Ruiz DO as PCP - [facility]  Prakash Mckeon MD as Consulting Physician (Cardiology)    Wt Readings from Last 3 Encounters:   02/01/21 211 lb 9.6 oz (96 kg)   01/11/21 211 lb (95.7 kg)   10/07/20 206 lb (93.4 kg)     Vitals:    02/01/21 1121   BP: 130/80   Pulse: 74   Resp: 16   Temp: 97.2 °F (36.2 °C)   SpO2: 98%   Weight: 211 lb 9.6 oz (96 kg)   Height: 5' 6\" (1.676 m)     Body mass index is 34.15 kg/m². Based upon direct observation of the patient, evaluation of cognition reveals recent and remote memory intact.     General Appearance: alert and oriented to person, place and time, well developed and well- nourished, in no acute distress  Skin: warm and dry, no rash or erythema  Head: normocephalic and atraumatic  Eyes: pupils equal, round, and reactive to light, extraocular eye movements intact, conjunctivae normal  ENT: tympanic membrane, external ear and ear canal normal bilaterally, nose without deformity, nasal mucosa and turbinates normal without polyps  Neck: supple and non-tender without mass, no thyromegaly or thyroid nodules, no cervical lymphadenopathy  Pulmonary/Chest: clear to auscultation bilaterally- no wheezes, rales or rhonchi, normal air movement, no respiratory distress  Cardiovascular: normal rate, regular rhythm, normal S1 and S2, no murmurs, rubs, clicks, or gallops, distal pulses intact, no carotid bruits  Abdomen: soft, non-tender, non-distended, normal bowel sounds, no masses or organomegaly  Extremities: no cyanosis, clubbing or edema  Musculoskeletal: normal range of motion, no joint swelling, deformity or tenderness  Neurologic: reflexes normal and symmetric, no cranial nerve deficit, gait, coordination and speech normal    Patient's complete Health Risk Assessment and screening values have been reviewed and are found in [facility]. The following problems were reviewed today and where indicated follow up appointments were made and/or referrals ordered. Positive Risk Factor Screenings with Interventions:            General Health and ACP:  General  In general, how would you say your health is?: Very Good  In the past 7 days, have you experienced any of the following?  New or Increased Pain, New or Increased Fatigue, Loneliness, Social Isolation, Stress or Anger?: (!) New or Increased Pain  Do you get the social and emotional support that you need?: Yes  Do you have a Living Will?: (!) No  Advance Directives     Power of  Living Will ACP-Advance Directive ACP-Power of     Not on File Not on File Not on File Not on File      General Health Risk Interventions:  · Fatigue: regular exercise recommended- 3-5 times per week, 30-45 minutes per session    Health Habits/Nutrition:  Health Habits/Nutrition  Do you exercise for at least 20 minutes 2-3 times per week?: (!) No  Have you lost any weight without trying in the past 3 months?: No  Do you eat fewer than 2 meals per day?: No  Have you seen a dentist within the past year?: Yes  Body mass index: (!) 34.15  Health Habits/Nutrition Interventions:  · Inadequate physical activity:  patient agrees to exercise for at least 150 minutes/week     Safety:  Safety  Do you have working smoke detectors?: Yes  Have all throw rugs been removed or fastened?: (!) No  Do you have non-slip mats or surfaces in all bathtubs/showers?: Yes  Do all of your stairways have a railing or banister?: Yes  Are your doorways, halls and stairs free of clutter?: Yes  Do you always fasten your seatbelt when you are in a car?: Yes  Safety Interventions:  · Home safety tips provided     Personalized Preventive Plan   Current Health Maintenance Status  Immunization History   Administered Date(s) Administered    Influenza Vaccine, unspecified formulation 10/08/2014    Influenza, High Dose (Fluzone 65 yrs and older) 09/26/2017, 09/26/2018    Influenza, Triv, inactivated, subunit, adjuvanted, IM (Fluad 65 yrs and older) 10/15/2019    Pneumococcal Conjugate 13-valent ([id number]) 02/03/2017    Pneumococcal Polysaccharide ([id number]) 10/08/2014        Health Maintenance   Topic Date Due    Hepatitis C screen  1950    COVID-19 Vaccine (1 of 2) 01/22/1964    DTaP/Tdap/Td vaccine (1 - Tdap) 01/22/1967    Colon cancer screen colonoscopy  01/22/1998    Annual Wellness Visit (AWV)  06/19/2019    Lipid screen  10/15/2020    Potassium monitoring  12/27/2021    Creatinine monitoring  12/27/2021    Flu vaccine  Completed    Shingles Vaccine  Completed    Pneumococcal 65+ years Vaccine  Completed    AAA screen  Completed    Hepatitis A vaccine  Aged Out    Hepatitis B vaccine  Aged Out    Hib vaccine  Aged Out    Meningococcal (ACWY) vaccine  Aged Out     Recommendations for [employer] Due: see orders and patient instructions/AVS.  . Recommended screening schedule for the next 5-10 years is provided to the patient in written form: see Patient Instructions/AVS.    Дмитрий Gordillo was seen today for medicare awv. Diagnoses and all orders for this visit:    Hyperlipidemia, unspecified hyperlipidemia type  -     Comprehensive Metabolic Panel; Future  -     CBC Auto Differential; Future  -     Lipid Panel; Future  -     TSH without Reflex; Future    Essential hypertension  -     Comprehensive Metabolic Panel; Future  -     CBC Auto Differential; Future  -     Lipid Panel; Future  -     TSH without Reflex; Future    Persistent atrial fibrillation  -     Comprehensive Metabolic Panel; Future  -     CBC Auto Differential; Future  -     Lipid Panel; Future  -     TSH without Reflex; Future    Prostate cancer screening  -     PSA SCREENING;  Future    Routine general medical examination at a health care facility    Rectal bleed  -     External Referral To Gastroenterology c/o rectal bleeding- dr Sukhjinder Harp

## 2021-02-01 NOTE — PATIENT INSTRUCTIONS
Personalized Preventive Plan for Erick Doctor Severiano Viveros - 2/1/2021  Medicare offers a range of preventive health benefits. Some of the tests and screenings are paid in full while other may be subject to a deductible, co-insurance, and/or copay. Some of these benefits include a comprehensive review of your medical history including lifestyle, illnesses that may run in your family, and various assessments and screenings as appropriate. After reviewing your medical record and screening and assessments performed today your provider may have ordered immunizations, labs, imaging, and/or referrals for you. A list of these orders (if applicable) as well as your Preventive Care list are included within your After Visit Summary for your review. Other Preventive Recommendations:    · A preventive eye exam performed by an eye specialist is recommended every 1-2 years to screen for glaucoma; cataracts, macular degeneration, and other eye disorders. · A preventive dental visit is recommended every 6 months. · Try to get at least 150 minutes of exercise per week or 10,000 steps per day on a pedometer . · Order or download the FREE \"Exercise & Physical Activity: Your Everyday Guide\" from [employer]. Call 2-957.402.6052 or search [employer] online. · You need 0803-3550 mg of calcium and 8388-6384 IU of vitamin D per day. It is possible to meet your calcium requirement with diet alone, but a vitamin D supplement is usually necessary to meet this goal.  · When exposed to the sun, use a sunscreen that protects against both UVA and UVB radiation with an SPF of 30 or greater. Reapply every 2 to 3 hours or after sweating, drying off with a towel, or swimming. · Always wear a seat belt when traveling in a car. Always wear a helmet when riding a bicycle or motorcycle.

## 2021-02-18 RX ORDER — PANTOPRAZOLE SODIUM 40 MG/1
TABLET, DELAYED RELEASE ORAL
Qty: 90 TABLET | Refills: 3 | Status: SHIPPED
Start: 2021-02-18 | End: 2021-02-19 | Stop reason: SDUPTHER

## 2021-02-19 RX ORDER — PANTOPRAZOLE SODIUM 40 MG/1
TABLET, DELAYED RELEASE ORAL
Qty: 90 TABLET | Refills: 3 | Status: SHIPPED
Start: 2021-02-19 | End: 2022-02-23

## 2021-02-23 ENCOUNTER — HOSPITAL ENCOUNTER (EMERGENCY)
Age: 71
Discharge: HOME | End: 2021-02-23
Payer: MEDICARE

## 2021-02-23 ENCOUNTER — APPOINTMENT (OUTPATIENT)
Dept: CT IMAGING | Age: 71
End: 2021-02-23
Payer: MEDICARE

## 2021-02-23 VITALS
RESPIRATION RATE: 16 BRPM | TEMPERATURE: 96.9 F | SYSTOLIC BLOOD PRESSURE: 177 MMHG | HEART RATE: 68 BPM | OXYGEN SATURATION: 96 % | DIASTOLIC BLOOD PRESSURE: 85 MMHG

## 2021-02-23 DIAGNOSIS — Z79.01 CHRONIC ANTICOAGULATION: ICD-10-CM

## 2021-02-23 DIAGNOSIS — S00.81XA FACIAL ABRASION, INITIAL ENCOUNTER: Primary | ICD-10-CM

## 2021-02-23 DIAGNOSIS — S00.83XA CONTUSION OF FACE, INITIAL ENCOUNTER: ICD-10-CM
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
## 2021-02-23 PROCEDURE — 90715 TDAP VACCINE 7 YRS/> IM: CPT | Performed by: NURSE PRACTITIONER

## 2021-02-23 PROCEDURE — 6360000002 HC RX W HCPCS: Performed by: NURSE PRACTITIONER

## 2021-02-23 PROCEDURE — 99283 EMERGENCY DEPT VISIT LOW MDM: CPT

## 2021-02-23 PROCEDURE — 90471 IMMUNIZATION ADMIN: CPT | Performed by: NURSE PRACTITIONER

## 2021-02-23 PROCEDURE — 6370000000 HC RX 637 (ALT 250 FOR IP): Performed by: NURSE PRACTITIONER

## 2021-02-23 PROCEDURE — 70450 CT HEAD/BRAIN W/O DYE: CPT

## 2021-02-23 PROCEDURE — 72125 CT NECK SPINE W/O DYE: CPT

## 2021-02-23 PROCEDURE — 70486 CT MAXILLOFACIAL W/O DYE: CPT

## 2021-02-23 RX ORDER — DIAPER,BRIEF,INFANT-TODD,DISP
EACH MISCELLANEOUS ONCE
Status: COMPLETED | OUTPATIENT
Start: 2021-02-23 | End: 2021-02-23

## 2021-02-23 RX ORDER — ACETAMINOPHEN 500 MG
1000 TABLET ORAL ONCE
Status: DISCONTINUED | OUTPATIENT
Start: 2021-02-23 | End: 2021-02-23

## 2021-02-23 RX ADMIN — BACITRACIN: 500 OINTMENT TOPICAL at 12:32

## 2021-02-23 RX ADMIN — TETANUS TOXOID, REDUCED DIPHTHERIA TOXOID AND ACELLULAR PERTUSSIS VACCINE, ADSORBED 0.5 ML: 5; 2.5; 8; 8; 2.5 SUSPENSION INTRAMUSCULAR at 12:32

## 2021-02-23 NOTE — ED PROVIDER NOTES
[facility]  Department of Emergency Medicine   ED  Encounter Note  Admit Date/RoomTime: 2021 11:09 AM  ED Room:     NAME: John Mccarty  : 1950  MRN: [id number]     Chief Complaint:  Fall (slip on ice this morning, hit face on eloquis. -LOC) and Head Injury (abrasion on nose. )    History of Present Illness       Elio Polanco is a 66 y.o. old male who presents to the emergency department by private vehicle with spouse, for a mechanical fall which occured 10:45 AM prior to arrival. He reportedly fell while walking while on an icy driveway at home prior to incident with complaints of abrasions to his nose and right knee. He reports he put his hands out to brace the fall and landed directly on his right knee and face and denies any LOC, headache, dizziness, blurred vision, nausea, vomiting, chest pain or shortness of breath. Patient was ambulatory on arrival and has no bony tenderness to the knees. The patients tetanus status is unknown. Since onset the symptoms have been stable and persistent. His pain is aggraveated by pressure on or palpation of the face of and relieved by nothing, as no treatment has been provided prior to this visit. He denies any abdominal pain, back pain, extremity injury, numbness or weakness. He takes Eliquis for a history of atrial fib. ROS   Pertinent positives and negatives are stated within HPI, all other systems reviewed and are negative. Past Medical History:  has a past medical history of Hyperlipidemia and Hypertension. Surgical History:  has a past surgical history that includes Kidney removal (Right); hernia repair; Kidney surgery; eye surgery (Left, 2018); transesophageal echocardiogram (2018); Cardioversion (2018); pr fragment kidney stone/ eswl (Right, 10/25/2018); and Lithotripsy. Social History:  reports that he quit smoking about 42 years ago.  He has a 0.13 pack-year smoking history. He has never used smokeless tobacco. He reports current alcohol use. He reports that he does not use drugs. Family History: family history includes Diabetes in his brother; High Blood Pressure in his father. Allergies: Pcn [penicillins]    Physical Exam   Oxygen Saturation Interpretation: Normal.        ED Triage Vitals   BP Temp Temp src Pulse Resp SpO2 Height Weight   02/23/21 1111 02/23/21 1110 -- 02/23/21 1110 02/23/21 1110 02/23/21 1110 -- --   (!) 177/85 96.9 °F (36.1 °C)  68 16 96 %           Physical Exam  Constitutional:  Alert, development consistent with age. HEENT:  NC/NT. PERRLA. 4/3. Airway patent. No racoon's or sarabia's sign noted. No septal hematoma or hemotympanum. Superficial abrasion noted over the nose with mild tenderness. No intranasal abrasions noted. Neck:  No midline or paravertebral tenderness. Normal ROM. Supple. Chest:  Symmetrical without visible rash or tenderness. Respiratory:  Lungs Clear to auscultation and breath sounds equal.  CV:  Regular rate and rhythm, normal heart sounds, without pathological murmurs, ectopy, gallops, or rubs. GI:  Abdomen Soft, nontender, good bowel sounds. No firm or pulsatile mass. Pelvis:  Stable, nontender to palpation. Back:  No midline or paravertebral tenderness in the thoracic, lumbar or sacral spine. No costovertebral tenderness. Extremities: No tenderness or edema noted. Bilateral hand grasp symmetrically strong. Strength in upper and lower extremities 5/5. Superficial abrasion noted to the right patella region with full range of motion and no bony tenderness or laxity. 2+ pedal and posterior tibial pulses intact. No wrist pain with palpation full range of motion. Integument:  Normal turgor. Warm, dry, without visible rash, unless noted elsewhere. Lymphatic: no lymphadenopathy noted  Neurological:  Oriented x3, GCS 15. Motor functions intact. Gait is steady. Upper and lower body strength 5/5.     Kody Estrada / Anmol Barber Results   (All laboratory and radiology results have been personally reviewed by myself)  Labs:  No results found for this visit on 02/23/21. Imaging: All Radiology results interpreted by Radiologist unless otherwise noted. CT HEAD WO CONTRAST   Final Result   1. There is no acute intracranial abnormality. Specifically, there is no   intracranial hemorrhage. 2. Atrophy and periventricular leukomalacia,      CT CERVICAL SPINE WO CONTRAST   Final Result   1. There is no acute compression fracture or subluxation of the cervical   spine. 2. Multilevel degenerative disc and degenerative joint disease. CT FACIAL BONES WO CONTRAST   Final Result   No acute maxillofacial fracture with mild soft tissue swelling at the central   aspect of the forehead along the nasal bridge. ED Course / Medical Decision Making     Medications   Tetanus-Diphth-Acell Pertussis (BOOSTRIX) injection 0.5 mL (0.5 mLs Intramuscular Given 2/23/21 1232)   bacitracin zinc ointment ( Topical Given 2/23/21 1232)        Re-examination:  2/23/21     Time: Discussed findings with patient and spouse and given specific closed head injury instructions and signs and symptoms warranting immediate return. Patients symptoms show no change and did not want any Tylenol during today's visit. Consult(s):   None    Procedure(s):   none    MDM: This is a 38-year-old gentleman who is on chronic anticoagulation for history of A. fib who presents today for abrasion on his nose and right knee after slipping on the ice in his driveway. Patient has no bony tenderness to the knee and was able to ambulate to the ED and drove himself here and does not want the knee x-rayed. Will obtain CT of the head and neck and facial bones. Patient has a superficial abrasion and tetanus was updated today. Wounds were cleaned and bacitracin applied. No septal hematoma or hemotympanum noted.   CT of the head was negative for any acute intracranial injury; CT of the facial bones were negative for any acute fractures; CT of cervical spine was negative for fractures. Patient is no acute distress. Patient advised to follow-up with his PCP for reevaluation. Patient given close head injury instructions and safety precautions while taking anticoagulation. Advised on signs and symptoms warranting immediate return to the ED for reevaluation at any time. Plan of Care/Counseling:  I reviewed today's visit with the patient and spouse / life partner in addition to providing specific details for the plan of care and counseling regarding the diagnosis and prognosis. Questions are answered at this time and are agreeable with the plan. Assessment      1. Facial abrasion, initial encounter    2. Contusion of face, initial encounter    3. Fall due to slipping on ice or snow, initial encounter    4. Chronic anticoagulation      Plan   Discharge home. Patient condition is good    New Medications     Discharge Medication List as of 2/23/2021  1:47 PM        Electronically signed by SO Nicole CNP   DD: 2/23/21  **This report was transcribed using voice recognition software. Every effort was made to ensure accuracy; however, inadvertent computerized transcription errors may be present.   END OF ED PROVIDER NOTE      SO Nicole CNP  02/24/21 [id number]

## 2021-02-28 ENCOUNTER — IMMUNIZATION (OUTPATIENT)
Dept: PRIMARY CARE CLINIC | Age: 71
End: 2021-02-28
Payer: MEDICARE

## 2021-02-28 PROCEDURE — 0002A PR IMM ADMN SARSCOV2 30MCG/0.3ML DIL RECON 2ND DOSE: CPT | Performed by: PHYSICIAN ASSISTANT

## 2021-02-28 PROCEDURE — 91300 COVID-19, PFIZER VACCINE 30MCG/0.3ML DOSE: CPT | Performed by: PHYSICIAN ASSISTANT

## 2021-03-24 ENCOUNTER — IMMUNIZATION (OUTPATIENT)
Dept: PRIMARY CARE CLINIC | Age: 71
End: 2021-03-24
Payer: MEDICARE

## 2021-03-24 PROCEDURE — 0002A COVID-19, PFIZER VACCINE 30MCG/0.3ML DOSE: CPT | Performed by: INTERNAL MEDICINE

## 2021-03-24 PROCEDURE — 91300 COVID-19, PFIZER VACCINE 30MCG/0.3ML DOSE: CPT | Performed by: INTERNAL MEDICINE

## 2021-03-31 ENCOUNTER — OFFICE VISIT (OUTPATIENT)
Dept: PRIMARY CARE CLINIC | Age: 71
End: 2021-03-31
Payer: MEDICARE

## 2021-03-31 VITALS
HEIGHT: 66 IN | TEMPERATURE: 96.6 F | BODY MASS INDEX: 34.75 KG/M2 | SYSTOLIC BLOOD PRESSURE: 138 MMHG | HEART RATE: 83 BPM | DIASTOLIC BLOOD PRESSURE: 80 MMHG | WEIGHT: 216.2 LBS | OXYGEN SATURATION: 97 %

## 2021-03-31 DIAGNOSIS — S69.92XA INJURY OF FINGER OF LEFT HAND, INITIAL ENCOUNTER: Primary | ICD-10-CM

## 2021-03-31 DIAGNOSIS — S67.10XA CRUSHING INJURY OF FINGER, INITIAL ENCOUNTER: ICD-10-CM

## 2021-03-31 PROCEDURE — 1036F TOBACCO NON-USER: CPT | Performed by: FAMILY MEDICINE

## 2021-03-31 PROCEDURE — 4040F PNEUMOC VAC/ADMIN/RCVD: CPT | Performed by: FAMILY MEDICINE

## 2021-03-31 PROCEDURE — 3017F COLORECTAL CA SCREEN DOC REV: CPT | Performed by: FAMILY MEDICINE

## 2021-03-31 PROCEDURE — 99213 OFFICE O/P EST LOW 20 MIN: CPT | Performed by: FAMILY MEDICINE

## 2021-03-31 PROCEDURE — G8427 DOCREV CUR MEDS BY ELIG CLIN: HCPCS | Performed by: FAMILY MEDICINE

## 2021-03-31 PROCEDURE — G8417 CALC BMI ABV UP PARAM F/U: HCPCS | Performed by: FAMILY MEDICINE

## 2021-03-31 PROCEDURE — 1123F ACP DISCUSS/DSCN MKR DOCD: CPT | Performed by: FAMILY MEDICINE

## 2021-03-31 PROCEDURE — G8484 FLU IMMUNIZE NO ADMIN: HCPCS | Performed by: FAMILY MEDICINE

## 2021-03-31 RX ORDER — DOXYCYCLINE HYCLATE 100 MG
100 TABLET ORAL 2 TIMES DAILY
Qty: 14 TABLET | Refills: 0 | Status: SHIPPED | OUTPATIENT
Start: 2021-03-31 | End: 2021-04-07

## 2021-03-31 ASSESSMENT — ENCOUNTER SYMPTOMS
BACK PAIN: 0
COUGH: 0
PHOTOPHOBIA: 0
BLOOD IN STOOL: 0
SORE THROAT: 0
SHORTNESS OF BREATH: 0
FACIAL SWELLING: 0
WHEEZING: 0
ALLERGIC/IMMUNOLOGIC NEGATIVE: 1
NAUSEA: 0
SINUS PRESSURE: 0
VOMITING: 0
CHEST TIGHTNESS: 0
DIARRHEA: 0
COLOR CHANGE: 0
ABDOMINAL PAIN: 0
APNEA: 0

## 2021-03-31 NOTE — PROGRESS NOTES
Chief Complaint:   Chief Complaint   Patient presents with    Finger Injury     slammed finger in car door       Hand Injury   The incident occurred 2 days ago. The incident occurred at home. The injury mechanism was a direct blow. The pain is present in the left fingers. The quality of the pain is described as aching. The pain does not radiate. The pain is at a severity of 3/10. The pain is mild. The pain has been fluctuating since the incident. Pertinent negatives include no chest pain. He has tried acetaminophen for the symptoms. The treatment provided moderate relief. Patient Active Problem List   Diagnosis    Hypertension    Hyperlipidemia    Persistent atrial fibrillation    Valvular heart disease    Thoracic ascending aortic aneurysm [facility])       Past Medical History:   Diagnosis Date    Hyperlipidemia     Hypertension        Past Surgical History:   Procedure Laterality Date    CARDIOVERSION  05/30/2018    EYE SURGERY Left 05/2018    cataracts, retinal tear repair.  HERNIA REPAIR      KIDNEY REMOVAL Right     Half of kidney removed.      KIDNEY SURGERY      LITHOTRIPSY      [place] FRAGMENT KIDNEY STONE/ ESWL Right 10/25/2018    ESWL EXTRACORPEAL SHOCK WAVE LITHOTRIPSY WITH CYSTO AND RIGHT STENT PLACEMENT performed by Rebeka Carlos MD at [facility] TRANSESOPHAGEAL ECHOCARDIOGRAM  05/30/2018       Current Outpatient Medications   Medication Sig Dispense Refill    doxycycline hyclate (VIBRA-TABS) 100 MG tablet Take 1 tablet by mouth 2 times daily for 7 days 14 tablet 0    pantoprazole (PROTONIX) 40 MG tablet TAKE 1 TABLET EVERY DAY 90 tablet 3    apixaban (ELIQUIS) 5 MG TABS tablet Take 1 tablet by mouth 2 times daily 180 tablet 3    atorvastatin (LIPITOR) 40 MG tablet TAKE 1 TABLET DAILY 90 tablet 3    metoprolol tartrate (LOPRESSOR) 25 MG tablet TAKE 1 TABLET TWICE A  tablet 3    ramipril (ALTACE) 10 MG capsule TAKE 1 CAPSULE TWICE DAILY 180 capsule 3    ACETAMINOPHEN PO Take 650 mg by mouth 2 times daily as needed       cetirizine (ZYRTEC) 5 MG tablet Take 5 mg by mouth daily. Current Facility-Administered Medications   Medication Dose Route Frequency Provider Last Rate Last Admin    perflutren lipid microspheres (DEFINITY) injection 1.65 mg  1.5 mL Intravenous ONCE PRN Pau Loredo MD           Allergies   Allergen Reactions    Pcn [Penicillins] Rash       Social History     Socioeconomic History    Marital status:      Spouse name: None    Number of children: None    Years of education: None    Highest education level: None   Occupational History     Employer: [employer]   Social Needs    Financial resource strain: None    Food insecurity     Worry: None     Inability: None    Transportation needs     Medical: None     Non-medical: None   Tobacco Use    Smoking status: Former Smoker     Packs/day: 0.25     Years: 0.50     Pack years: 0.12     Quit date: 1978     Years since quittin.8    Smokeless tobacco: Never Used   Substance and Sexual Activity    Alcohol use: Yes     Types: 3 Glasses of wine per week     Comment: occasionally    Drug use: No    Sexual activity: None   Lifestyle    Physical activity     Days per week: None     Minutes per session: None    Stress: None   Relationships    Social connections     Talks on phone: None     Gets together: None     Attends Shinto service: None     Active member of club or organization: None     Attends meetings of clubs or organizations: None     Relationship status: None    Intimate partner violence     Fear of current or ex partner: None     Emotionally abused: None     Physically abused: None     Forced sexual activity: None   Other Topics Concern    None   Social History Narrative    None       Family History   Problem Relation Age of Onset    High Blood Pressure Father     Diabetes Brother          Review of Systems   Constitutional: Negative.     HENT: Negative for congestion, facial swelling, hearing loss, nosebleeds, sinus pressure and sore throat. Eyes: Negative for photophobia and visual disturbance. Respiratory: Negative for apnea, cough, chest tightness, shortness of breath and wheezing. Cardiovascular: Negative for chest pain, palpitations and leg swelling. Gastrointestinal: Negative for abdominal pain, blood in stool, diarrhea, nausea and vomiting. Genitourinary: Negative for difficulty urinating, frequency and urgency. Musculoskeletal: Positive for joint swelling. Negative for arthralgias, back pain and myalgias. Skin: Negative for color change and rash. Allergic/Immunologic: Negative. Neurological: Negative for syncope, weakness, light-headedness and headaches. Hematological: Negative. Psychiatric/Behavioral: Negative for agitation, behavioral problems, confusion and self-injury. The patient is not nervous/anxious. All other systems reviewed and are negative. Physical Exam  Vitals signs and nursing note reviewed. Constitutional:       General: He is not in acute distress. Appearance: He is well-developed. HENT:      Head: Normocephalic and atraumatic. Nose: Nose normal.   Eyes:      Conjunctiva/sclera: Conjunctivae normal.      Pupils: Pupils are equal, round, and reactive to light. Neck:      Musculoskeletal: Normal range of motion and neck supple. Thyroid: No thyromegaly. Vascular: No JVD. Cardiovascular:      Rate and Rhythm: Normal rate and regular rhythm. Heart sounds: Normal heart sounds. No murmur. No friction rub. No gallop. Pulmonary:      Effort: Pulmonary effort is normal. No respiratory distress. Breath sounds: Normal breath sounds. No wheezing. Abdominal:      General: Bowel sounds are normal. There is no distension. Palpations: Abdomen is soft. Tenderness: There is no abdominal tenderness. There is no guarding or rebound. Musculoskeletal: Normal range of motion. Hands:    Lymphadenopathy:      Cervical: No cervical adenopathy. Skin:     General: Skin is warm and dry. Findings: Erythema present. No rash. Neurological:      Mental Status: He is alert and oriented to person, place, and time. Cranial Nerves: No cranial nerve deficit. Motor: No abnormal muscle tone. Coordination: Coordination normal.      Deep Tendon Reflexes: Reflexes are normal and symmetric. Psychiatric:         Behavior: Behavior normal.         Judgment: Judgment normal.                               ASSESSMENT/PLAN:    Tiffanie Jones was seen today for finger injury. Diagnoses and all orders for this visit:    Injury of finger of left hand, initial encounter  -     XR FINGER LEFT (MIN 2 VIEWS); Future  -     doxycycline hyclate (VIBRA-TABS) 100 MG tablet; Take 1 tablet by mouth 2 times daily for 7 days    Crushing injury of finger, initial encounter  -     doxycycline hyclate (VIBRA-TABS) 100 MG tablet;  Take 1 tablet by mouth 2 times daily for 7 days            Jose Pillai DO    3/31/2021  11:19 AM

## 2021-04-29 RX ORDER — RAMIPRIL 10 MG/1
CAPSULE ORAL
Qty: 180 CAPSULE | Refills: 3 | Status: SHIPPED
Start: 2021-04-29 | End: 2022-02-16

## 2021-06-10 RX ORDER — ATORVASTATIN CALCIUM 40 MG/1
TABLET, FILM COATED ORAL
Qty: 90 TABLET | Refills: 3 | Status: SHIPPED
Start: 2021-06-10 | End: 2022-02-23

## 2021-07-19 ENCOUNTER — OFFICE VISIT (OUTPATIENT)
Dept: CARDIOLOGY CLINIC | Age: 71
End: 2021-07-19
Payer: MEDICARE

## 2021-07-19 VITALS
SYSTOLIC BLOOD PRESSURE: 169 MMHG | WEIGHT: 205.6 LBS | DIASTOLIC BLOOD PRESSURE: 83 MMHG | BODY MASS INDEX: 33.04 KG/M2 | HEIGHT: 66 IN | RESPIRATION RATE: 16 BRPM | HEART RATE: 57 BPM

## 2021-07-19 DIAGNOSIS — I10 ESSENTIAL HYPERTENSION: ICD-10-CM

## 2021-07-19 DIAGNOSIS — I71.20 THORACIC AORTIC ANEURYSM WITHOUT RUPTURE: Primary | ICD-10-CM

## 2021-07-19 DIAGNOSIS — I48.0 PAROXYSMAL ATRIAL FIBRILLATION (HCC): Primary | ICD-10-CM

## 2021-07-19 DIAGNOSIS — I71.21 THORACIC ASCENDING AORTIC ANEURYSM: ICD-10-CM

## 2021-07-19 PROCEDURE — 1123F ACP DISCUSS/DSCN MKR DOCD: CPT | Performed by: INTERNAL MEDICINE

## 2021-07-19 PROCEDURE — 4040F PNEUMOC VAC/ADMIN/RCVD: CPT | Performed by: INTERNAL MEDICINE

## 2021-07-19 PROCEDURE — G8427 DOCREV CUR MEDS BY ELIG CLIN: HCPCS | Performed by: INTERNAL MEDICINE

## 2021-07-19 PROCEDURE — G8417 CALC BMI ABV UP PARAM F/U: HCPCS | Performed by: INTERNAL MEDICINE

## 2021-07-19 PROCEDURE — 1036F TOBACCO NON-USER: CPT | Performed by: INTERNAL MEDICINE

## 2021-07-19 PROCEDURE — 99214 OFFICE O/P EST MOD 30 MIN: CPT | Performed by: INTERNAL MEDICINE

## 2021-07-19 PROCEDURE — 3017F COLORECTAL CA SCREEN DOC REV: CPT | Performed by: INTERNAL MEDICINE

## 2021-07-19 PROCEDURE — 93000 ELECTROCARDIOGRAM COMPLETE: CPT | Performed by: INTERNAL MEDICINE

## 2021-07-19 NOTE — PROGRESS NOTES
Chief Complaint   Patient presents with    Hypertension    Atrial Fibrillation       Patient Active Problem List    Diagnosis Date Noted    Valvular heart disease 2020     Overview Note:     A. Echo 2019: mild to moderate MR, mild AI, EF 55%      Thoracic ascending aortic aneurysm ([facility] [address] 2020     Overview Note:     A. Echo 2019: 4.3 cm   B. CT scan 2020: 4.3 cm       Paroxysmal atrial fibrillation ([facility] [address] 2018     Overview Note:     A. CHADS-VASC = 2  B .  DORINDA guided DC cardioversion 2018      Hypertension 2015    Hyperlipidemia 2015       Current Outpatient Medications   Medication Sig Dispense Refill    atorvastatin (LIPITOR) 40 MG tablet TAKE 1 TABLET DAILY 90 tablet 3    metoprolol tartrate (LOPRESSOR) 25 MG tablet Take 1 tablet by mouth 2 times daily 180 tablet 3    ramipril (ALTACE) 10 MG capsule TAKE 1 CAPSULE TWICE DAILY 180 capsule 3    pantoprazole (PROTONIX) 40 MG tablet TAKE 1 TABLET EVERY DAY 90 tablet 3    apixaban (ELIQUIS) 5 MG TABS tablet Take 1 tablet by mouth 2 times daily 180 tablet 3    ACETAMINOPHEN PO Take 650 mg by mouth 2 times daily as needed       cetirizine (ZYRTEC) 5 MG tablet Take 5 mg by mouth daily. No current facility-administered medications for this visit.         Allergies   Allergen Reactions    Pcn [Penicillins] Rash       Vitals:    21 1014 21 1019   BP: (!) 192/94 (!) 169/83   Pulse: 57    Resp: 16    Weight: 205 lb 9.6 oz (93.3 kg)    Height: 5' 6\" (1.676 m)        Social History     Socioeconomic History    Marital status:      Spouse name: Not on file    Number of children: Not on file    Years of education: Not on file    Highest education level: Not on file   Occupational History     Employer: [employer]   Tobacco Use    Smoking status: Former Smoker     Packs/day: 0.25     Years: 0.50     Pack years: 0.12     Quit date: 1978     Years since quittin.1    Smokeless tobacco: Never Throat: denies sore throat or trouble swallowing. Neuro: denies numbness, tingling, tremors. Skin: denies rashes or itching. : denies hematuria, dysuria   GI: denies vomiting, diarrhea   Psych: denies mood changed, anxiety, depression. all others negative. Physical Exam   BP (!) 169/83   Pulse 57   Resp 16   Ht 5' 6\" (1.676 m)   Wt 205 lb 9.6 oz (93.3 kg)   BMI 33.18 kg/m²   Constitutional: Oriented to person, place, and time. Well-developed and well-nourished. No distress. Head: Normocephalic and atraumatic. Eyes: EOM are normal. Pupils are equal, round, and reactive to light. Neck: Normal range of motion. Neck supple. No hepatojugular reflux and no JVD present. Carotid bruit is not present. No tracheal deviation present. No thyromegaly present. Cardiovascular: Normal rate, regular rhythm, normal heart sounds and intact distal pulses. Exam reveals no gallop and no friction rub. Soft blowing murmur AI murmur heard in sitting position . No MR murmur heard  Pulmonary/Chest: Effort normal and breath sounds normal. No respiratory distress. No wheezes. No rales. No tenderness. Abdominal: Soft. Bowel sounds are normal. No distension and no mass. No tenderness. No rebound and no guarding. Musculoskeletal: Normal range of motion. No edema and no tenderness. Neurological: Alert and oriented to person, place, and time. Skin: Skin is warm and dry. No rash noted. Not diaphoretic. No erythema. Psychiatric: Normal mood and affect. Behavior is normal.     EKG:  NSR, rate 57, axis -28,  left axis deviation, remote septal MI pattern, no change    ASSESSMENT AND PLAN:  Patient Active Problem List   Diagnosis    Hypertension: at target on two agents by home readings,, but I have asked him to up the frequency of sampling and use an arm cuff.  Hyperlipidemia: on high intensity statin .  Paroxysmal atrial fibrillation : DC cardioversion successful 2018  . Continue NOAC and BB.          * Valve disease, Stage B:  AI mild by TTE in 2019 , and inaudible today. No MR heard. Asymptomatic. Will follow  Ascending thoracic aneurysm:  4.3 cm by CT scan 7/2020.   Will arrange scan        OV 1 year    Hermila Mitchell M.D  [facility]

## 2021-08-09 ENCOUNTER — HOSPITAL ENCOUNTER (OUTPATIENT)
Dept: CT IMAGING | Age: 71
Discharge: HOME | End: 2021-08-11
Payer: MEDICARE

## 2021-08-09 DIAGNOSIS — I71.20 THORACIC AORTIC ANEURYSM WITHOUT RUPTURE: ICD-10-CM

## 2021-08-09 PROCEDURE — 71250 CT THORAX DX C-: CPT

## 2021-08-10 ENCOUNTER — TELEPHONE (OUTPATIENT)
Dept: CARDIOLOGY CLINIC | Age: 71
End: 2021-08-10

## 2021-08-10 NOTE — TELEPHONE ENCOUNTER
----- Message from Merry Sol MD sent at 8/10/2021 10:38 AM EDT -----  Ct shows aneurysm still 4.3  Ov  6 months

## 2021-11-11 ENCOUNTER — TELEPHONE (OUTPATIENT)
Dept: CARDIOLOGY CLINIC | Age: 71
End: 2021-11-11

## 2021-11-11 NOTE — TELEPHONE ENCOUNTER
Patient called in and would like to know if there is ome thing other than Eliquis he can take that would work as ythis but he does not want Coumadin. If not he will just continue this medication.

## 2022-01-19 RX ORDER — APIXABAN 5 MG/1
TABLET, FILM COATED ORAL
Qty: 180 TABLET | Refills: 3 | Status: SHIPPED
Start: 2022-01-19 | End: 2022-02-23

## 2022-02-15 ENCOUNTER — OFFICE VISIT (OUTPATIENT)
Dept: CARDIOLOGY CLINIC | Age: 72
End: 2022-02-15
Payer: MEDICARE

## 2022-02-15 VITALS
SYSTOLIC BLOOD PRESSURE: 178 MMHG | RESPIRATION RATE: 12 BRPM | BODY MASS INDEX: 33.27 KG/M2 | WEIGHT: 207 LBS | DIASTOLIC BLOOD PRESSURE: 86 MMHG | HEART RATE: 73 BPM | HEIGHT: 66 IN

## 2022-02-15 DIAGNOSIS — I48.0 PAROXYSMAL ATRIAL FIBRILLATION (HCC): ICD-10-CM

## 2022-02-15 DIAGNOSIS — I38 VALVULAR HEART DISEASE: ICD-10-CM

## 2022-02-15 DIAGNOSIS — I71.21 THORACIC ASCENDING AORTIC ANEURYSM: ICD-10-CM

## 2022-02-15 DIAGNOSIS — R06.09 DOE (DYSPNEA ON EXERTION): ICD-10-CM

## 2022-02-15 DIAGNOSIS — I10 PRIMARY HYPERTENSION: Primary | ICD-10-CM

## 2022-02-15 PROCEDURE — 99215 OFFICE O/P EST HI 40 MIN: CPT | Performed by: INTERNAL MEDICINE

## 2022-02-15 PROCEDURE — G8417 CALC BMI ABV UP PARAM F/U: HCPCS | Performed by: INTERNAL MEDICINE

## 2022-02-15 PROCEDURE — 4040F PNEUMOC VAC/ADMIN/RCVD: CPT | Performed by: INTERNAL MEDICINE

## 2022-02-15 PROCEDURE — 93000 ELECTROCARDIOGRAM COMPLETE: CPT | Performed by: INTERNAL MEDICINE

## 2022-02-15 PROCEDURE — G8484 FLU IMMUNIZE NO ADMIN: HCPCS | Performed by: INTERNAL MEDICINE

## 2022-02-15 PROCEDURE — 1036F TOBACCO NON-USER: CPT | Performed by: INTERNAL MEDICINE

## 2022-02-15 PROCEDURE — 3017F COLORECTAL CA SCREEN DOC REV: CPT | Performed by: INTERNAL MEDICINE

## 2022-02-15 PROCEDURE — G8427 DOCREV CUR MEDS BY ELIG CLIN: HCPCS | Performed by: INTERNAL MEDICINE

## 2022-02-15 PROCEDURE — 1123F ACP DISCUSS/DSCN MKR DOCD: CPT | Performed by: INTERNAL MEDICINE

## 2022-02-15 NOTE — PROGRESS NOTES
Chief Complaint   Patient presents with    Atrial Fibrillation    Hypertension       Patient Active Problem List    Diagnosis Date Noted    Valvular heart disease 01/02/2020     Overview Note:     A. Echo 11/7/2019: mild to moderate MR, mild AI, EF 55%      Thoracic ascending aortic aneurysm ([facility] [address] 01/02/2020     Overview Note:     A. Echo 11/2019: 4.3 cm   B. CT scan 7/2020: 4.3 cm   C. CT 8/2021: 4.3 cm      Paroxysmal atrial fibrillation ([address] 05/19/2018     Overview Note:     A. CHADS-VASC = 2  B .  DORINDA guided DC cardioversion 5/30/2018      Hypertension 07/23/2015    Hyperlipidemia 07/23/2015       Current Outpatient Medications   Medication Sig Dispense Refill    ELIQUIS 5 MG TABS tablet TAKE 1 TABLET BY MOUTH  TWICE DAILY 180 tablet 3    atorvastatin (LIPITOR) 40 MG tablet TAKE 1 TABLET DAILY 90 tablet 3    metoprolol tartrate (LOPRESSOR) 25 MG tablet Take 1 tablet by mouth 2 times daily 180 tablet 3    ramipril (ALTACE) 10 MG capsule TAKE 1 CAPSULE TWICE DAILY 180 capsule 3    pantoprazole (PROTONIX) 40 MG tablet TAKE 1 TABLET EVERY DAY 90 tablet 3    ACETAMINOPHEN PO Take 650 mg by mouth 2 times daily as needed       cetirizine (ZYRTEC) 5 MG tablet Take 5 mg by mouth daily.        Current Facility-Administered Medications   Medication Dose Route Frequency Provider Last Rate Last Admin    perflutren lipid microspheres (DEFINITY) injection 1.65 mg  1.5 mL IntraVENous ONCE PRN El Don MD            Allergies   Allergen Reactions    Pcn [Penicillins] Rash       Vitals:    02/15/22 0804 02/15/22 0805   BP: (!) 178/101 (!) 178/86   Pulse: 73    Resp: 12    Weight: 207 lb (93.9 kg)    Height: 5' 6\" (1.676 m)        Social History     Socioeconomic History    Marital status:      Spouse name: Not on file    Number of children: Not on file    Years of education: Not on file    Highest education level: Not on file   Occupational History     Employer: [employer]   Tobacco Use    Smoking status: Former Smoker     Packs/day: 0.25     Years: 0.50     Pack years: 0.12     Quit date: 1978     Years since quittin.7    Smokeless tobacco: Never Used   Substance and Sexual Activity    Alcohol use: Yes     Types: 3 Glasses of wine per week     Comment: occasionally    Drug use: No    Sexual activity: Not on file   Other Topics Concern    Not on file   Social History Narrative    Not on file     Social Determinants of Health     Financial Resource Strain:     Difficulty of Paying Living Expenses: Not on file   Food Insecurity:     Worried About Running Out of Food in the Last Year: Not on file    Tia of Food in the Last Year: Not on file   Transportation Needs:     Lack of Transportation (Medical): Not on file    Lack of Transportation (Non-Medical): Not on file   Physical Activity:     Days of Exercise per Week: Not on file    Minutes of Exercise per Session: Not on file   Stress:     Feeling of Stress : Not on file   Social Connections:     Frequency of Communication with Friends and Family: Not on file    Frequency of Social Gatherings with Friends and Family: Not on file    Attends Yazidi Services: Not on file    Active Member of [address] [place] [address] [employer] or Organizations: Not on file    Attends Club or Organization Meetings: Not on file    Marital Status: Not on file   Intimate Partner Violence:     Fear of Current or Ex-Partner: Not on file    Emotionally Abused: Not on file    Physically Abused: Not on file    Sexually Abused: Not on file   Housing Stability:     Unable to Pay for Housing in the Last Year: Not on file    Number of [place] in the Last Year: Not on file    Unstable Housing in the Last Year: Not on file       Family History   Problem Relation Age of Onset    High Blood Pressure Father     Diabetes Brother          SUBJECTIVE: Soila Bustos presents to the office today for chronic cardiac diagnoses .   He complains of an episode of profound weakness and sob while shoveling snow (although not accustomed to that activity) and he denies   chest pain, chest pressure/discomfort, claudication,  exertional chest pressure/discomfort, irregular heart beat, lower extremity edema, near-syncope, orthopnea, palpitations, paroxysmal nocturnal dyspnea, syncope and tachypnea. No bleeds, no neuro symps since eliquis. Claims home BPs better than today's, although some systolics in 803'H    Review of Systems:   Heart: as above   Lungs: as above   Eyes: denies changes in vision or discharge. Ears: denies changes in hearing or pain. Nose: denies epistaxis or masses   Throat: denies sore throat or trouble swallowing. Neuro: denies numbness, tingling, tremors. Skin: denies rashes or itching. : denies hematuria, dysuria   GI: denies vomiting, diarrhea   Psych: denies mood changed, anxiety, depression. all others negative. Physical Exam   BP (!) 178/86   Pulse 73   Resp 12   Ht 5' 6\" (1.676 m)   Wt 207 lb (93.9 kg)   BMI 33.41 kg/m²   Constitutional: Oriented to person, place, and time. Well-developed and well-nourished. No distress. Head: Normocephalic and atraumatic. Eyes: EOM are normal. Pupils are equal, round, and reactive to light. Neck: Normal range of motion. Neck supple. No hepatojugular reflux and no JVD present. Carotid bruit is not present. No tracheal deviation present. No thyromegaly present. Cardiovascular: Normal rate, regular rhythm, normal heart sounds and intact distal pulses. Exam reveals no gallop and no friction rub. Soft blowing murmur AI murmur heard in sitting position . Grade II/ plateau murmur of MR heard at apex with radiation to axilla, best appreciated in LLD positoinn  Pulmonary/Chest: Effort normal and breath sounds normal. No respiratory distress. No wheezes. No rales. No tenderness. Abdominal: Soft. Bowel sounds are normal. No distension and no mass. No tenderness. No rebound and no guarding.    Musculoskeletal: Normal range of motion. No edema and no tenderness. Neurological: Alert and oriented to person, place, and time. Skin: Skin is warm and dry. No rash noted. Not diaphoretic. No erythema. Psychiatric: Normal mood and affect. Behavior is normal.     EKG:  NSR, frequent PVCs,   left axis deviation    ASSESSMENT AND PLAN:  Patient Active Problem List   Diagnosis    Hypertension:  May need upward titration of ACE    Hyperlipidemia: on high intensity statin .  Paroxysmal atrial fibrillation : DC cardioversion successful 2018  . Continue NOAC and BB.         *    * Valve disease, Stage B:  AI mild by TTE in 2019 , and inaudible today. MR heard today and may be progressive given symptoms. Will echo   Ascending thoracic aneurysm:  4.3 cm by CT scan 2021, stable three years running.  No need scan this year  JUAREZ: will get exercise nuclear            González Lester M.D  [facility]

## 2022-02-16 ENCOUNTER — TELEPHONE (OUTPATIENT)
Dept: PRIMARY CARE CLINIC | Age: 72
End: 2022-02-16

## 2022-02-16 RX ORDER — RAMIPRIL 10 MG/1
CAPSULE ORAL
Qty: 180 CAPSULE | Refills: 3 | Status: SHIPPED
Start: 2022-02-16 | End: 2022-02-23

## 2022-02-16 NOTE — TELEPHONE ENCOUNTER
----- Message from Nathanindira Светлана sent at 2/16/2022 12:18 PM EST -----  Subject: Appointment Request    Reason for Call: Routine Medicare AWV    QUESTIONS  Type of Appointment? Established Patient  Reason for appointment request? No appointments available during search  Additional Information for Provider? pt would like a call back from office   in regards to schedule a medicare well visit  ---------------------------------------------------------------------------  --------------  CALL BACK INFO  What is the best way for the office to contact you? OK to leave message on   voicemail  Preferred Call Back Phone Number? [phone]  ---------------------------------------------------------------------------  --------------  SCRIPT ANSWERS  Relationship to Patient? Self  (If the patient has Medicare as their primary insurance coverage ask this   question) Are you requesting a Medicare Annual Wellness Visit? Yes   (Is the patient requesting a pap smear with their physical exam?)? No  (Is the patient requesting their annual physical and does not need PAP or   AWV per above?)? No  Have you been diagnosed with, awaiting test results for, or told that you   are suspected of having COVID-19 (Coronavirus)? (If patient has tested   negative or was tested as a requirement for work, school, or travel and   not based on symptoms, answer no)? No  Within the past 10 days have you developed any of the following symptoms   (answer no if symptoms have been present longer than 10 days or began   more than 10 days ago)? Fever or Chills, Cough, Shortness of breath or   difficulty breathing, Loss of taste or smell, Sore throat, Nasal   congestion, Sneezing or runny nose, Fatigue or generalized body aches   (answer no if pain is specific to a body part e.g. back pain), Diarrhea,   Headache? No  Have you had close contact with someone with COVID-19 in the last 7 days?    No  (Service Expert  click yes below to proceed with [employer] As Usual   Scheduling)?  Yes

## 2022-02-22 ENCOUNTER — OFFICE VISIT (OUTPATIENT)
Dept: PRIMARY CARE CLINIC | Age: 72
End: 2022-02-22
Payer: MEDICARE

## 2022-02-22 VITALS
TEMPERATURE: 97.2 F | RESPIRATION RATE: 18 BRPM | SYSTOLIC BLOOD PRESSURE: 122 MMHG | DIASTOLIC BLOOD PRESSURE: 76 MMHG | HEART RATE: 84 BPM | BODY MASS INDEX: 33.91 KG/M2 | OXYGEN SATURATION: 99 % | HEIGHT: 66 IN | WEIGHT: 211 LBS

## 2022-02-22 DIAGNOSIS — I10 ESSENTIAL HYPERTENSION: ICD-10-CM

## 2022-02-22 DIAGNOSIS — E78.5 HYPERLIPIDEMIA, UNSPECIFIED HYPERLIPIDEMIA TYPE: ICD-10-CM

## 2022-02-22 DIAGNOSIS — Z12.5 PROSTATE CANCER SCREENING: ICD-10-CM

## 2022-02-22 DIAGNOSIS — Z11.59 NEED FOR HEPATITIS C SCREENING TEST: ICD-10-CM

## 2022-02-22 DIAGNOSIS — L60.2 LONG TOENAIL: ICD-10-CM

## 2022-02-22 DIAGNOSIS — Z72.89 OTHER PROBLEMS RELATED TO LIFESTYLE: ICD-10-CM

## 2022-02-22 DIAGNOSIS — E78.5 HYPERLIPIDEMIA, UNSPECIFIED HYPERLIPIDEMIA TYPE: Primary | ICD-10-CM

## 2022-02-22 DIAGNOSIS — Z00.00 MEDICARE ANNUAL WELLNESS VISIT, SUBSEQUENT: ICD-10-CM

## 2022-02-22 LAB
ANISOCYTOSIS: ABNORMAL
BASOPHILS ABSOLUTE: 0.04 E9/L (ref 0–0.2)
BASOPHILS RELATIVE PERCENT: 0.8 % (ref 0–2)
BURR CELLS: ABNORMAL
EOSINOPHILS ABSOLUTE: 0.07 E9/L (ref 0.05–0.5)
EOSINOPHILS RELATIVE PERCENT: 1.4 % (ref 0–6)
HCT VFR BLD CALC: 28.9 % (ref 37–54)
HEMOGLOBIN: 7.7 G/DL (ref 12.5–16.5)
HYPOCHROMIA: ABNORMAL
IMMATURE GRANULOCYTES #: 0.02 E9/L
IMMATURE GRANULOCYTES %: 0.4 % (ref 0–5)
LYMPHOCYTES ABSOLUTE: 1.37 E9/L (ref 1.5–4)
LYMPHOCYTES RELATIVE PERCENT: 26.8 % (ref 20–42)
MCH RBC QN AUTO: 17.5 PG (ref 26–35)
MCHC RBC AUTO-ENTMCNC: 26.6 % (ref 32–34.5)
MCV RBC AUTO: 65.8 FL (ref 80–99.9)
MONOCYTES ABSOLUTE: 0.73 E9/L (ref 0.1–0.95)
MONOCYTES RELATIVE PERCENT: 14.3 % (ref 2–12)
NEUTROPHILS ABSOLUTE: 2.88 E9/L (ref 1.8–7.3)
NEUTROPHILS RELATIVE PERCENT: 56.3 % (ref 43–80)
OVALOCYTES: ABNORMAL
PDW BLD-RTO: 20.4 FL (ref 11.5–15)
PLATELET # BLD: 182 E9/L (ref 130–450)
PMV BLD AUTO: ABNORMAL FL (ref 7–12)
POIKILOCYTES: ABNORMAL
POLYCHROMASIA: ABNORMAL
RBC # BLD: 4.39 E12/L (ref 3.8–5.8)
SCHISTOCYTES: ABNORMAL
TARGET CELLS: ABNORMAL
TEAR DROP CELLS: ABNORMAL
WBC # BLD: 5.1 E9/L (ref 4.5–11.5)

## 2022-02-22 PROCEDURE — 4040F PNEUMOC VAC/ADMIN/RCVD: CPT | Performed by: FAMILY MEDICINE

## 2022-02-22 PROCEDURE — 1123F ACP DISCUSS/DSCN MKR DOCD: CPT | Performed by: FAMILY MEDICINE

## 2022-02-22 PROCEDURE — G0439 PPPS, SUBSEQ VISIT: HCPCS | Performed by: FAMILY MEDICINE

## 2022-02-22 PROCEDURE — 3017F COLORECTAL CA SCREEN DOC REV: CPT | Performed by: FAMILY MEDICINE

## 2022-02-22 PROCEDURE — G8484 FLU IMMUNIZE NO ADMIN: HCPCS | Performed by: FAMILY MEDICINE

## 2022-02-22 ASSESSMENT — PATIENT HEALTH QUESTIONNAIRE - PHQ9
2. FEELING DOWN, DEPRESSED OR HOPELESS: 0
1. LITTLE INTEREST OR PLEASURE IN DOING THINGS: 0
SUM OF ALL RESPONSES TO PHQ QUESTIONS 1-9: 0
SUM OF ALL RESPONSES TO PHQ9 QUESTIONS 1 & 2: 0
SUM OF ALL RESPONSES TO PHQ QUESTIONS 1-9: 0

## 2022-02-22 ASSESSMENT — LIFESTYLE VARIABLES: HOW OFTEN DO YOU HAVE A DRINK CONTAINING ALCOHOL: 2-4 TIMES A MONTH

## 2022-02-22 NOTE — PROGRESS NOTES
Medicare Annual Wellness Visit    Kameron Heath is here for Medicare AWV, Hyperlipidemia, and Hypertension    Assessment & Plan   Mary Ellen Agustin was seen today for medicare awv, hyperlipidemia and hypertension. Diagnoses and all orders for this visit:    Hyperlipidemia, unspecified hyperlipidemia type  -     CBC with Auto Differential; Future  -     Comprehensive Metabolic Panel; Future  -     Lipid Panel; Future  -     TSH; Future    Essential hypertension  -     CBC with Auto Differential; Future  -     Comprehensive Metabolic Panel; Future  -     Lipid Panel; Future  -     TSH; Future    Prostate cancer screening  -     PSA Screening; Future         Recommendations for Preventive Services Due: see orders and patient instructions/AVS.  Recommended screening schedule for the next 5-10 years is provided to the patient in written form: see Patient Instructions/AVS.     No follow-ups on file. Reviewed and updated this visit by clinical staff:  Tobacco  Allergies  Meds  Med Hx  Surg Hx  Soc Hx  Fam Hx      Subjective   Doing well having echocardiogram done soon    Patient's complete Health Risk Assessment and screening values have been reviewed and are found in Flowsheets. The following problems were reviewed today and where indicated follow up appointments were made and/or referrals ordered.     Positive Risk Factor Screenings with Interventions:             General Health and ACP:  General  In general, how would you say your health is?: Very Good  In the past 7 days, have you experienced any of the following: New or Increased Pain, New or Increased Fatigue, Loneliness, Social Isolation, Stress or Anger?: No  Do you get the social and emotional support that you need?: Yes  Do you have a Living Will?: (!) No    Advance Directives     Power of  Living Will ACP-Advance Directive ACP-Power of     Not on File Not on File Not on File Not on File      General Health Risk Interventions:  · Fatigue: regular exercise recommended- 3-5 times per week, 30-45 minutes per session    Health Habits/Nutrition:     Physical Activity: Insufficiently Active    Days of Exercise per Week: 2 days    Minutes of Exercise per Session: 10 min     Have you lost any weight without trying in the past 3 months?: No    Body mass index: (!) 34.05    Have you seen the dentist within the past year?: Yes      Health Habits/Nutrition Interventions:  · Inadequate physical activity:  patient agrees to exercise for at least 150 minutes/week          Objective         General Appearance: alert and oriented to person, place and time, well developed and well- nourished, in no acute distress  Skin: warm and dry, no rash or erythema  Head: normocephalic and atraumatic  Eyes: pupils equal, round, and reactive to light, extraocular eye movements intact, conjunctivae normal  ENT: tympanic membrane, external ear and ear canal normal bilaterally, nose without deformity, nasal mucosa and turbinates normal without polyps  Neck: supple and non-tender without mass, no thyromegaly or thyroid nodules, no cervical lymphadenopathy  Pulmonary/Chest: clear to auscultation bilaterally- no wheezes, rales or rhonchi, normal air movement, no respiratory distress  Cardiovascular: normal rate, regular rhythm, normal S1 and S2, pos murmurs, rubs, clicks, or gallops, distal pulses intact, no carotid bruits  Abdomen: soft, non-tender, non-distended, normal bowel sounds, no masses or organomegaly  Extremities: no cyanosis, clubbing or edema  Musculoskeletal: normal range of motion, no joint swelling, deformity or tenderness  Neurologic: reflexes normal and symmetric, no cranial nerve deficit, gait, coordination and speech normal      Allergies   Allergen Reactions    Pcn [Penicillins] Rash     Prior to Visit Medications    Medication Sig Taking?  Authorizing Provider   ramipril (ALTACE) 10 MG capsule TAKE 1 CAPSULE BY MOUTH  TWICE DAILY  Vincent Adams DO   ELIQUIS 5 MG TABS tablet TAKE 1 TABLET BY MOUTH  TWICE DAILY  Elder Dasilva DO   atorvastatin (LIPITOR) 40 MG tablet TAKE 1 TABLET DAILY  Willian Swartz,    metoprolol tartrate (LOPRESSOR) 25 MG tablet Take 1 tablet by mouth 2 times daily  Willian F Sehridan, DO   pantoprazole (PROTONIX) 40 MG tablet TAKE 1 TABLET EVERY DAY  Elder Dasilva DO   ACETAMINOPHEN PO Take 650 mg by mouth 2 times daily as needed   Historical Provider, MD   cetirizine (ZYRTEC) 5 MG tablet Take 5 mg by mouth daily.   Historical Provider, MD Rubin (Including outside providers/suppliers regularly involved in providing care):   Patient Care Team:  Elder Dasilva DO as PCP - General  Elder Dasilva DO as PCP - [facility] Empaneled Provider  Darrius Don MD as Consulting Physician (Cardiology)

## 2022-02-22 NOTE — PATIENT INSTRUCTIONS
Personalized Preventive Plan for Huyen Mcgowan - 2/22/2022  Medicare offers a range of preventive health benefits. Some of the tests and screenings are paid in full while other may be subject to a deductible, co-insurance, and/or copay. Some of these benefits include a comprehensive review of your medical history including lifestyle, illnesses that may run in your family, and various assessments and screenings as appropriate. After reviewing your medical record and screening and assessments performed today your provider may have ordered immunizations, labs, imaging, and/or referrals for you. A list of these orders (if applicable) as well as your Preventive Care list are included within your After Visit Summary for your review. Other Preventive Recommendations:    · A preventive eye exam performed by an eye specialist is recommended every 1-2 years to screen for glaucoma; cataracts, macular degeneration, and other eye disorders. · A preventive dental visit is recommended every 6 months. · Try to get at least 150 minutes of exercise per week or 10,000 steps per day on a pedometer . · Order or download the FREE \"Exercise & Physical Activity: Your Everyday Guide\" from [employer]. Call 7-655.915.9944 or search [employer] online. · You need 8275-6157 mg of calcium and 5365-8966 IU of vitamin D per day. It is possible to meet your calcium requirement with diet alone, but a vitamin D supplement is usually necessary to meet this goal.  · When exposed to the sun, use a sunscreen that protects against both UVA and UVB radiation with an SPF of 30 or greater. Reapply every 2 to 3 hours or after sweating, drying off with a towel, or swimming. · Always wear a seat belt when traveling in a car. Always wear a helmet when riding a bicycle or motorcycle.

## 2022-02-23 ENCOUNTER — APPOINTMENT (OUTPATIENT)
Dept: CT IMAGING | Age: 72
End: 2022-02-23
Payer: MEDICARE

## 2022-02-23 ENCOUNTER — HOSPITAL ENCOUNTER (OUTPATIENT)
Age: 72
Setting detail: OBSERVATION
LOS: 1 days | Discharge: HOME | End: 2022-02-25
Attending: EMERGENCY MEDICINE | Admitting: FAMILY MEDICINE
Payer: MEDICARE

## 2022-02-23 DIAGNOSIS — Z79.01 CHRONIC ANTICOAGULATION: ICD-10-CM

## 2022-02-23 DIAGNOSIS — D64.9 ANEMIA, UNSPECIFIED TYPE: Primary | ICD-10-CM

## 2022-02-23 DIAGNOSIS — I71.20 THORACIC AORTIC ANEURYSM WITHOUT RUPTURE: ICD-10-CM

## 2022-02-23 LAB
ABO/RH: NORMAL
ACANTHOCYTES: ABNORMAL
ACANTHOCYTES: ABNORMAL
ALBUMIN SERPL-MCNC: 4.1 G/DL (ref 3.5–5.2)
ALBUMIN SERPL-MCNC: 4.3 G/DL (ref 3.5–5.2)
ALP BLD-CCNC: 49 U/L (ref 40–129)
ALP BLD-CCNC: 51 U/L (ref 40–129)
ALT SERPL-CCNC: 12 U/L (ref 0–40)
ALT SERPL-CCNC: 13 U/L (ref 0–40)
ANION GAP SERPL CALCULATED.3IONS-SCNC: 10 MMOL/L (ref 7–16)
ANION GAP SERPL CALCULATED.3IONS-SCNC: 11 MMOL/L (ref 7–16)
ANISOCYTOSIS: ABNORMAL
ANISOCYTOSIS: ABNORMAL
ANTIBODY SCREEN: NORMAL
AST SERPL-CCNC: 14 U/L (ref 0–39)
AST SERPL-CCNC: 17 U/L (ref 0–39)
BASOPHILS ABSOLUTE: 0 E9/L (ref 0–0.2)
BASOPHILS ABSOLUTE: 0.04 E9/L (ref 0–0.2)
BASOPHILS RELATIVE PERCENT: 0 % (ref 0–2)
BASOPHILS RELATIVE PERCENT: 0.5 % (ref 0–2)
BILIRUB SERPL-MCNC: 1.4 MG/DL (ref 0–1.2)
BILIRUB SERPL-MCNC: 1.7 MG/DL (ref 0–1.2)
BUN BLDV-MCNC: 20 MG/DL (ref 6–23)
BUN BLDV-MCNC: 21 MG/DL (ref 6–23)
BURR CELLS: ABNORMAL
BURR CELLS: ABNORMAL
CALCIUM SERPL-MCNC: 8.6 MG/DL (ref 8.6–10.2)
CALCIUM SERPL-MCNC: 9.5 MG/DL (ref 8.6–10.2)
CHLORIDE BLD-SCNC: 105 MMOL/L (ref 98–107)
CHLORIDE BLD-SCNC: 106 MMOL/L (ref 98–107)
CHOLESTEROL, TOTAL: 123 MG/DL (ref 0–199)
CO2: 24 MMOL/L (ref 22–29)
CO2: 25 MMOL/L (ref 22–29)
CREAT SERPL-MCNC: 1 MG/DL (ref 0.7–1.2)
CREAT SERPL-MCNC: 1.1 MG/DL (ref 0.7–1.2)
EKG ATRIAL RATE: 78 BPM
EKG P AXIS: 74 DEGREES
EKG P-R INTERVAL: 200 MS
EKG Q-T INTERVAL: 424 MS
EKG QRS DURATION: 98 MS
EKG QTC CALCULATION (BAZETT): 483 MS
EKG R AXIS: -28 DEGREES
EKG T AXIS: 79 DEGREES
EKG VENTRICULAR RATE: 78 BPM
EOSINOPHILS ABSOLUTE: 0.04 E9/L (ref 0.05–0.5)
EOSINOPHILS ABSOLUTE: 0.11 E9/L (ref 0.05–0.5)
EOSINOPHILS RELATIVE PERCENT: 0.5 % (ref 0–6)
EOSINOPHILS RELATIVE PERCENT: 2.6 % (ref 0–6)
GFR AFRICAN AMERICAN: >60
GFR AFRICAN AMERICAN: >60
GFR NON-AFRICAN AMERICAN: >60 ML/MIN/1.73
GFR NON-AFRICAN AMERICAN: >60 ML/MIN/1.73
GLUCOSE BLD-MCNC: 100 MG/DL (ref 74–99)
GLUCOSE BLD-MCNC: 85 MG/DL (ref 74–99)
HCT VFR BLD CALC: 28.1 % (ref 37–54)
HCT VFR BLD CALC: 28.7 % (ref 37–54)
HDLC SERPL-MCNC: 52 MG/DL
HEMOCCULT STL QL: NEGATIVE
HEMOCCULT STL QL: NORMAL
HEMOCCULT STL QL: NORMAL
HEMOGLOBIN: 7.6 G/DL (ref 12.5–16.5)
HEMOGLOBIN: 7.8 G/DL (ref 12.5–16.5)
HEPATITIS C ANTIBODY INTERPRETATION: NORMAL
HYPOCHROMIA: ABNORMAL
HYPOCHROMIA: ABNORMAL
IMMATURE GRANULOCYTES #: 0.02 E9/L
IMMATURE GRANULOCYTES %: 0.3 % (ref 0–5)
LDL CHOLESTEROL CALCULATED: 59 MG/DL (ref 0–99)
LYMPHOCYTES ABSOLUTE: 1.52 E9/L (ref 1.5–4)
LYMPHOCYTES ABSOLUTE: 1.58 E9/L (ref 1.5–4)
LYMPHOCYTES RELATIVE PERCENT: 20.6 % (ref 20–42)
LYMPHOCYTES RELATIVE PERCENT: 36.8 % (ref 20–42)
MAGNESIUM: 1.9 MG/DL (ref 1.6–2.6)
MCH RBC QN AUTO: 17.5 PG (ref 26–35)
MCH RBC QN AUTO: 17.6 PG (ref 26–35)
MCHC RBC AUTO-ENTMCNC: 27 % (ref 32–34.5)
MCHC RBC AUTO-ENTMCNC: 27.2 % (ref 32–34.5)
MCV RBC AUTO: 64.3 FL (ref 80–99.9)
MCV RBC AUTO: 65 FL (ref 80–99.9)
METAMYELOCYTES RELATIVE PERCENT: 1.8 % (ref 0–1)
MONOCYTES ABSOLUTE: 0.29 E9/L (ref 0.1–0.95)
MONOCYTES ABSOLUTE: 0.66 E9/L (ref 0.1–0.95)
MONOCYTES RELATIVE PERCENT: 7 % (ref 2–12)
MONOCYTES RELATIVE PERCENT: 8.6 % (ref 2–12)
NEUTROPHILS ABSOLUTE: 2.21 E9/L (ref 1.8–7.3)
NEUTROPHILS ABSOLUTE: 5.32 E9/L (ref 1.8–7.3)
NEUTROPHILS RELATIVE PERCENT: 51.8 % (ref 43–80)
NEUTROPHILS RELATIVE PERCENT: 69.5 % (ref 43–80)
NUCLEATED RED BLOOD CELLS: 0 /100 WBC
OVALOCYTES: ABNORMAL
OVALOCYTES: ABNORMAL
PDW BLD-RTO: 19.8 FL (ref 11.5–15)
PDW BLD-RTO: 19.8 FL (ref 11.5–15)
PHOSPHORUS: 3.7 MG/DL (ref 2.5–4.5)
PLATELET # BLD: 164 E9/L (ref 130–450)
PLATELET # BLD: 173 E9/L (ref 130–450)
PMV BLD AUTO: ABNORMAL FL (ref 7–12)
PMV BLD AUTO: ABNORMAL FL (ref 7–12)
POIKILOCYTES: ABNORMAL
POIKILOCYTES: ABNORMAL
POLYCHROMASIA: ABNORMAL
POLYCHROMASIA: ABNORMAL
POTASSIUM REFLEX MAGNESIUM: 4 MMOL/L (ref 3.5–5)
POTASSIUM SERPL-SCNC: 4.5 MMOL/L (ref 3.5–5)
PROSTATE SPECIFIC ANTIGEN: 1.69 NG/ML (ref 0–4)
RBC # BLD: 4.32 E12/L (ref 3.8–5.8)
RBC # BLD: 4.46 E12/L (ref 3.8–5.8)
SCHISTOCYTES: ABNORMAL
SCHISTOCYTES: ABNORMAL
SODIUM BLD-SCNC: 140 MMOL/L (ref 132–146)
SODIUM BLD-SCNC: 141 MMOL/L (ref 132–146)
TARGET CELLS: ABNORMAL
TEAR DROP CELLS: ABNORMAL
TOTAL PROTEIN: 6.5 G/DL (ref 6.4–8.3)
TOTAL PROTEIN: 7.4 G/DL (ref 6.4–8.3)
TRIGL SERPL-MCNC: 59 MG/DL (ref 0–149)
TROPONIN, HIGH SENSITIVITY: 14 NG/L (ref 0–11)
TSH SERPL DL<=0.05 MIU/L-ACNC: 1.19 UIU/ML (ref 0.27–4.2)
VLDLC SERPL CALC-MCNC: 12 MG/DL
WBC # BLD: 4.1 E9/L (ref 4.5–11.5)
WBC # BLD: 7.7 E9/L (ref 4.5–11.5)

## 2022-02-23 PROCEDURE — 82728 ASSAY OF FERRITIN: CPT

## 2022-02-23 PROCEDURE — 6370000000 HC RX 637 (ALT 250 FOR IP): Performed by: INTERNAL MEDICINE

## 2022-02-23 PROCEDURE — 80053 COMPREHEN METABOLIC PANEL: CPT

## 2022-02-23 PROCEDURE — 6360000004 HC RX CONTRAST MEDICATION: Performed by: RADIOLOGY

## 2022-02-23 PROCEDURE — 6360000002 HC RX W HCPCS: Performed by: INTERNAL MEDICINE

## 2022-02-23 PROCEDURE — 86850 RBC ANTIBODY SCREEN: CPT

## 2022-02-23 PROCEDURE — 93010 ELECTROCARDIOGRAM REPORT: CPT | Performed by: INTERNAL MEDICINE

## 2022-02-23 PROCEDURE — 84100 ASSAY OF PHOSPHORUS: CPT

## 2022-02-23 PROCEDURE — 74174 CTA ABD&PLVS W/CONTRAST: CPT

## 2022-02-23 PROCEDURE — 36415 COLL VENOUS BLD VENIPUNCTURE: CPT

## 2022-02-23 PROCEDURE — 96366 THER/PROPH/DIAG IV INF ADDON: CPT

## 2022-02-23 PROCEDURE — C9113 INJ PANTOPRAZOLE SODIUM, VIA: HCPCS | Performed by: INTERNAL MEDICINE

## 2022-02-23 PROCEDURE — 83735 ASSAY OF MAGNESIUM: CPT

## 2022-02-23 PROCEDURE — 86901 BLOOD TYPING SEROLOGIC RH(D): CPT

## 2022-02-23 PROCEDURE — 96375 TX/PRO/DX INJ NEW DRUG ADDON: CPT

## 2022-02-23 PROCEDURE — 96367 TX/PROPH/DG ADDL SEQ IV INF: CPT

## 2022-02-23 PROCEDURE — 83540 ASSAY OF IRON: CPT

## 2022-02-23 PROCEDURE — 71275 CT ANGIOGRAPHY CHEST: CPT

## 2022-02-23 PROCEDURE — 96376 TX/PRO/DX INJ SAME DRUG ADON: CPT

## 2022-02-23 PROCEDURE — 83550 IRON BINDING TEST: CPT

## 2022-02-23 PROCEDURE — APPSS60 APP SPLIT SHARED TIME 46-60 MINUTES: Performed by: PHYSICIAN ASSISTANT

## 2022-02-23 PROCEDURE — 93005 ELECTROCARDIOGRAM TRACING: CPT | Performed by: STUDENT IN AN ORGANIZED HEALTH CARE EDUCATION/TRAINING PROGRAM

## 2022-02-23 PROCEDURE — 2060000000 HC ICU INTERMEDIATE R&B

## 2022-02-23 PROCEDURE — 84484 ASSAY OF TROPONIN QUANT: CPT

## 2022-02-23 PROCEDURE — APPSS45 APP SPLIT SHARED TIME 31-45 MINUTES: Performed by: NURSE PRACTITIONER

## 2022-02-23 PROCEDURE — G0378 HOSPITAL OBSERVATION PER HR: HCPCS

## 2022-02-23 PROCEDURE — 99223 1ST HOSP IP/OBS HIGH 75: CPT | Performed by: INTERNAL MEDICINE

## 2022-02-23 PROCEDURE — 86900 BLOOD TYPING SEROLOGIC ABO: CPT

## 2022-02-23 PROCEDURE — 99285 EMERGENCY DEPT VISIT HI MDM: CPT

## 2022-02-23 PROCEDURE — 85025 COMPLETE CBC W/AUTO DIFF WBC: CPT

## 2022-02-23 PROCEDURE — 96365 THER/PROPH/DIAG IV INF INIT: CPT

## 2022-02-23 PROCEDURE — 2580000003 HC RX 258: Performed by: INTERNAL MEDICINE

## 2022-02-23 RX ORDER — METOPROLOL TARTRATE 50 MG/1
50 TABLET, FILM COATED ORAL 2 TIMES DAILY
Status: DISCONTINUED | OUTPATIENT
Start: 2022-02-23 | End: 2022-02-25 | Stop reason: HOSPADM

## 2022-02-23 RX ORDER — POLYETHYLENE GLYCOL 3350 17 G/17G
17 POWDER, FOR SOLUTION ORAL DAILY PRN
Status: DISCONTINUED | OUTPATIENT
Start: 2022-02-23 | End: 2022-02-25 | Stop reason: HOSPADM

## 2022-02-23 RX ORDER — METOPROLOL TARTRATE 50 MG/1
50 TABLET, FILM COATED ORAL 2 TIMES DAILY
Status: DISCONTINUED | OUTPATIENT
Start: 2022-02-23 | End: 2022-02-23

## 2022-02-23 RX ORDER — RAMIPRIL 5 MG/1
10 CAPSULE ORAL 2 TIMES DAILY
Status: DISCONTINUED | OUTPATIENT
Start: 2022-02-23 | End: 2022-02-25 | Stop reason: HOSPADM

## 2022-02-23 RX ORDER — ACETAMINOPHEN 325 MG/1
650 TABLET ORAL EVERY 6 HOURS PRN
Status: DISCONTINUED | OUTPATIENT
Start: 2022-02-23 | End: 2022-02-25 | Stop reason: HOSPADM

## 2022-02-23 RX ORDER — ATORVASTATIN CALCIUM 40 MG/1
40 TABLET, FILM COATED ORAL NIGHTLY
COMMUNITY
End: 2022-04-07

## 2022-02-23 RX ORDER — ONDANSETRON 2 MG/ML
4 INJECTION INTRAMUSCULAR; INTRAVENOUS EVERY 6 HOURS PRN
Status: DISCONTINUED | OUTPATIENT
Start: 2022-02-23 | End: 2022-02-25 | Stop reason: HOSPADM

## 2022-02-23 RX ORDER — PANTOPRAZOLE SODIUM 40 MG/1
40 TABLET, DELAYED RELEASE ORAL NIGHTLY
Status: ON HOLD | COMMUNITY
End: 2022-02-25 | Stop reason: HOSPADM

## 2022-02-23 RX ORDER — ACETAMINOPHEN 650 MG/1
650 SUPPOSITORY RECTAL EVERY 6 HOURS PRN
Status: DISCONTINUED | OUTPATIENT
Start: 2022-02-23 | End: 2022-02-25 | Stop reason: HOSPADM

## 2022-02-23 RX ORDER — SODIUM CHLORIDE 0.9 % (FLUSH) 0.9 %
5-40 SYRINGE (ML) INJECTION PRN
Status: DISCONTINUED | OUTPATIENT
Start: 2022-02-23 | End: 2022-02-25 | Stop reason: HOSPADM

## 2022-02-23 RX ORDER — POLYETHYLENE GLYCOL 3350 17 G/17G
17 POWDER, FOR SOLUTION ORAL DAILY
Status: DISCONTINUED | OUTPATIENT
Start: 2022-02-24 | End: 2022-02-25 | Stop reason: HOSPADM

## 2022-02-23 RX ORDER — SODIUM CHLORIDE 0.9 % (FLUSH) 0.9 %
5-40 SYRINGE (ML) INJECTION EVERY 12 HOURS SCHEDULED
Status: DISCONTINUED | OUTPATIENT
Start: 2022-02-23 | End: 2022-02-25 | Stop reason: HOSPADM

## 2022-02-23 RX ORDER — SODIUM CHLORIDE 9 MG/ML
25 INJECTION, SOLUTION INTRAVENOUS PRN
Status: DISCONTINUED | OUTPATIENT
Start: 2022-02-23 | End: 2022-02-25 | Stop reason: HOSPADM

## 2022-02-23 RX ORDER — PANTOPRAZOLE SODIUM 40 MG/10ML
40 INJECTION, POWDER, LYOPHILIZED, FOR SOLUTION INTRAVENOUS 2 TIMES DAILY
Status: DISCONTINUED | OUTPATIENT
Start: 2022-02-23 | End: 2022-02-25

## 2022-02-23 RX ORDER — MAGNESIUM SULFATE IN WATER 40 MG/ML
2000 INJECTION, SOLUTION INTRAVENOUS ONCE
Status: COMPLETED | OUTPATIENT
Start: 2022-02-23 | End: 2022-02-23

## 2022-02-23 RX ORDER — SODIUM CHLORIDE 9 MG/ML
10 INJECTION INTRAVENOUS 2 TIMES DAILY
Status: DISCONTINUED | OUTPATIENT
Start: 2022-02-23 | End: 2022-02-25

## 2022-02-23 RX ORDER — FERROUS SULFATE 325(65) MG
325 TABLET ORAL
Status: DISCONTINUED | OUTPATIENT
Start: 2022-02-24 | End: 2022-02-25 | Stop reason: HOSPADM

## 2022-02-23 RX ORDER — ONDANSETRON 4 MG/1
4 TABLET, ORALLY DISINTEGRATING ORAL EVERY 8 HOURS PRN
Status: DISCONTINUED | OUTPATIENT
Start: 2022-02-23 | End: 2022-02-25 | Stop reason: HOSPADM

## 2022-02-23 RX ORDER — SENNOSIDES 8.6 MG
650 CAPSULE ORAL 2 TIMES DAILY PRN
COMMUNITY

## 2022-02-23 RX ORDER — MAGNESIUM SULFATE 1 G/100ML
1000 INJECTION INTRAVENOUS ONCE
Status: DISCONTINUED | OUTPATIENT
Start: 2022-02-23 | End: 2022-02-23

## 2022-02-23 RX ORDER — RAMIPRIL 10 MG/1
10 CAPSULE ORAL 2 TIMES DAILY
COMMUNITY

## 2022-02-23 RX ADMIN — IOPAMIDOL 75 ML: 755 INJECTION, SOLUTION INTRAVENOUS at 11:02

## 2022-02-23 RX ADMIN — SODIUM CHLORIDE 100 MG: 9 INJECTION, SOLUTION INTRAVENOUS at 19:09

## 2022-02-23 RX ADMIN — SODIUM CHLORIDE 25 MG: 9 INJECTION, SOLUTION INTRAVENOUS at 18:09

## 2022-02-23 RX ADMIN — RAMIPRIL 10 MG: 5 CAPSULE ORAL at 20:29

## 2022-02-23 RX ADMIN — SODIUM CHLORIDE, PRESERVATIVE FREE 10 ML: 5 INJECTION INTRAVENOUS at 20:31

## 2022-02-23 RX ADMIN — PANTOPRAZOLE SODIUM 40 MG: 40 INJECTION, POWDER, FOR SOLUTION INTRAVENOUS at 15:17

## 2022-02-23 RX ADMIN — MAGNESIUM SULFATE HEPTAHYDRATE 2000 MG: 40 INJECTION, SOLUTION INTRAVENOUS at 15:18

## 2022-02-23 RX ADMIN — SODIUM CHLORIDE, PRESERVATIVE FREE 10 ML: 5 INJECTION INTRAVENOUS at 15:17

## 2022-02-23 RX ADMIN — SODIUM CHLORIDE 25 ML: 9 INJECTION, SOLUTION INTRAVENOUS at 15:18

## 2022-02-23 RX ADMIN — PANTOPRAZOLE SODIUM 40 MG: 40 INJECTION, POWDER, FOR SOLUTION INTRAVENOUS at 20:29

## 2022-02-23 RX ADMIN — SODIUM CHLORIDE, PRESERVATIVE FREE 10 ML: 5 INJECTION INTRAVENOUS at 20:32

## 2022-02-23 RX ADMIN — METOPROLOL TARTRATE 50 MG: 50 TABLET, FILM COATED ORAL at 15:38

## 2022-02-23 RX ADMIN — RAMIPRIL 10 MG: 5 CAPSULE ORAL at 15:40

## 2022-02-23 SDOH — HEALTH STABILITY: PHYSICAL HEALTH: ON AVERAGE, HOW MANY DAYS PER WEEK DO YOU ENGAGE IN MODERATE TO STRENUOUS EXERCISE (LIKE A BRISK WALK)?: 0 DAYS

## 2022-02-23 SDOH — ECONOMIC STABILITY: INCOME INSECURITY: IN THE LAST 12 MONTHS, WAS THERE A TIME WHEN YOU WERE NOT ABLE TO PAY THE MORTGAGE OR RENT ON TIME?: NO

## 2022-02-23 SDOH — ECONOMIC STABILITY: TRANSPORTATION INSECURITY
IN THE PAST 12 MONTHS, HAS THE LACK OF TRANSPORTATION KEPT YOU FROM MEDICAL APPOINTMENTS OR FROM GETTING MEDICATIONS?: NO

## 2022-02-23 SDOH — ECONOMIC STABILITY: HOUSING INSECURITY
IN THE LAST 12 MONTHS, WAS THERE A TIME WHEN YOU DID NOT HAVE A STEADY PLACE TO SLEEP OR SLEPT IN A SHELTER (INCLUDING NOW)?: NO

## 2022-02-23 SDOH — ECONOMIC STABILITY: FOOD INSECURITY: WITHIN THE PAST 12 MONTHS, YOU WORRIED THAT YOUR FOOD WOULD RUN OUT BEFORE YOU GOT MONEY TO BUY MORE.: NEVER TRUE

## 2022-02-23 SDOH — ECONOMIC STABILITY: TRANSPORTATION INSECURITY
IN THE PAST 12 MONTHS, HAS LACK OF TRANSPORTATION KEPT YOU FROM MEETINGS, WORK, OR FROM GETTING THINGS NEEDED FOR DAILY LIVING?: NO

## 2022-02-23 SDOH — HEALTH STABILITY: PHYSICAL HEALTH: ON AVERAGE, HOW MANY MINUTES DO YOU ENGAGE IN EXERCISE AT THIS LEVEL?: 0 MIN

## 2022-02-23 SDOH — ECONOMIC STABILITY: FOOD INSECURITY: WITHIN THE PAST 12 MONTHS, THE FOOD YOU BOUGHT JUST DIDN'T LAST AND YOU DIDN'T HAVE MONEY TO GET MORE.: NEVER TRUE

## 2022-02-23 ASSESSMENT — ENCOUNTER SYMPTOMS
SHORTNESS OF BREATH: 0
DIARRHEA: 0
WHEEZING: 0
COUGH: 0
EYE PAIN: 0
ABDOMINAL PAIN: 0
SORE THROAT: 0
BACK PAIN: 0
VOMITING: 0
EYE REDNESS: 0
NAUSEA: 0
SINUS PRESSURE: 0
EYE DISCHARGE: 0

## 2022-02-23 ASSESSMENT — SOCIAL DETERMINANTS OF HEALTH (SDOH)
IN A TYPICAL WEEK, HOW MANY TIMES DO YOU TALK ON THE PHONE WITH FAMILY, FRIENDS, OR NEIGHBORS?: MORE THAN THREE TIMES A WEEK
WITHIN THE LAST YEAR, HAVE TO BEEN RAPED OR FORCED TO HAVE ANY KIND OF SEXUAL ACTIVITY BY YOUR PARTNER OR EX-PARTNER?: NO
WITHIN THE LAST YEAR, HAVE TO BEEN RAPED OR FORCED TO HAVE ANY KIND OF SEXUAL ACTIVITY BY YOUR PARTNER OR EX-PARTNER?: NO
HOW HARD IS IT FOR YOU TO PAY FOR THE VERY BASICS LIKE FOOD, HOUSING, MEDICAL CARE, AND HEATING?: NOT HARD AT ALL
WITHIN THE LAST YEAR, HAVE YOU BEEN KICKED, HIT, SLAPPED, OR OTHERWISE PHYSICALLY HURT BY YOUR PARTNER OR EX-PARTNER?: NO
HOW OFTEN DO YOU GET TOGETHER WITH FRIENDS OR RELATIVES?: TWICE A WEEK
WITHIN THE LAST YEAR, HAVE YOU BEEN HUMILIATED OR EMOTIONALLY ABUSED IN OTHER WAYS BY YOUR PARTNER OR EX-PARTNER?: NO
HOW OFTEN DO YOU ATTENT MEETINGS OF THE CLUB OR ORGANIZATION YOU BELONG TO?: MORE THAN 4 TIMES PER YEAR
WITHIN THE LAST YEAR, HAVE YOU BEEN HUMILIATED OR EMOTIONALLY ABUSED IN OTHER WAYS BY YOUR PARTNER OR EX-PARTNER?: NO
WITHIN THE LAST YEAR, HAVE YOU BEEN AFRAID OF YOUR PARTNER OR EX-PARTNER?: NO
HOW OFTEN DO YOU ATTEND CHURCH OR RELIGIOUS SERVICES?: MORE THAN 4 TIMES PER YEAR
WITHIN THE LAST YEAR, HAVE YOU BEEN AFRAID OF YOUR PARTNER OR EX-PARTNER?: NO
DO YOU BELONG TO ANY CLUBS OR ORGANIZATIONS SUCH AS CHURCH GROUPS UNIONS, FRATERNAL OR ATHLETIC GROUPS, OR SCHOOL GROUPS?: YES

## 2022-02-23 ASSESSMENT — LIFESTYLE VARIABLES
HOW MANY STANDARD DRINKS CONTAINING ALCOHOL DO YOU HAVE ON A TYPICAL DAY: 1 OR 2
HOW OFTEN DO YOU HAVE A DRINK CONTAINING ALCOHOL: MONTHLY OR LESS

## 2022-02-23 ASSESSMENT — PATIENT HEALTH QUESTIONNAIRE - PHQ9
1. LITTLE INTEREST OR PLEASURE IN DOING THINGS: NOT AT ALL
2. FEELING DOWN, DEPRESSED OR HOPELESS: NOT AT ALL
DEPRESSION UNABLE TO ASSESS: YES
SUM OF ALL RESPONSES TO PHQ9 QUESTIONS 1 & 2: 0

## 2022-02-23 NOTE — CONSULTS
Inpatient Cardiology Consultation      Reason for Consult:  NSVT    Consulting Physician: Dr Jose Sood    Requesting Physician:  BRIAN Smyth    Date of Consultation: 2/23/2022    HISTORY OF PRESENT ILLNESS:   Mr Rashad Mccoy is a 75 yo male who follows with Dr Alonso Pal, last seen in the office 7/19/2021. Past medical history includes PAF with h/o DCCV 2018 on [address] with Eliquis ,VHD, hypertension, hyperlipidemia,  thoracic ascending aortic aneurysm, remote tobacco abuse, allergies. Presented to [facility] 2/23/2022 from PCP office with abnormal labs   VS: 98-78-14-99%RA-180/85   Labs: WBC 5.1, H&H 7.7/28.9, plt 182. K+ 4.5, BUN/SCr 20/1.1, glucose 85. troponin 14     tsh 1.19   CTA chest: stable aortic aneurysm, 4.2 x 4.1 cm. CTA ab/pelvis: negative for acute pathology     He was sent to the ED via PCP office for acute anemia, Hgb 7.7 as outpatient for GI bleed r/o. Eliquis on hold. Cardiology was asked to see the patient for NSVT. He denies any bleeding anywhere. He states that since November 2021 he has been more fatigued with exertion and having chest pressure that lasts about 5 minutes with exertion. He denies any associated shortness of breath, chest pain, palpitations, orthopnea or PND. He states that he has been having some dizziness/lightheadedness but denies falls or LOC. Please note: past medical records were reviewed per electronic medical record (EMR) - see detailed reports under Past Medical/ Surgical History. Past Medical History:    1. PAF s/.p DCCV 2018. On Eliquis. Symptomatic   2. TTE 2019: EF 55%, mild LVH, mild AR, mild-mod MR, no WMA. 3. Thoracic aortic aneurysm, 4.2 x 4.1 cm   4. Hypertension   5. Hyperlipidemia   6. H/o lithotripsy, cataract repair, right partial nephrectomy, hernia repair. Medications Prior to admit:  Prior to Admission medications    Medication Sig Start Date End Date Taking?  Authorizing Provider   acetaminophen (TYLENOL 8 HOUR ARTHRITIS PAIN) 650 MG extended release tablet Take 650 mg by mouth 2 times daily as needed for Pain   Yes Historical Provider, MD   atorvastatin (LIPITOR) 40 MG tablet Take 40 mg by mouth nightly   Yes Historical Provider, MD   apixaban (ELIQUIS) 5 MG TABS tablet Take 5 mg by mouth 2 times daily   Yes Historical Provider, MD   pantoprazole (PROTONIX) 40 MG tablet Take 40 mg by mouth nightly   Yes Historical Provider, MD   ramipril (ALTACE) 10 MG capsule Take 10 mg by mouth 2 times daily   Yes Historical Provider, MD   metoprolol tartrate (LOPRESSOR) 25 MG tablet Take 1 tablet by mouth 2 times daily 5/27/21  Yes Dominga Leader, DO   cetirizine (ZYRTEC) 5 MG tablet Take 5 mg by mouth nightly    Yes Historical Provider, MD       Current Medications:    Current Facility-Administered Medications: metoprolol tartrate (LOPRESSOR) tablet 25 mg, 25 mg, Oral, BID  ramipril (ALTACE) capsule 10 mg, 10 mg, Oral, BID  sodium chloride flush 0.9 % injection 5-40 mL, 5-40 mL, IntraVENous, 2 times per day  sodium chloride flush 0.9 % injection 5-40 mL, 5-40 mL, IntraVENous, PRN  0.9 % sodium chloride infusion, 25 mL, IntraVENous, PRN  ondansetron (ZOFRAN-ODT) disintegrating tablet 4 mg, 4 mg, Oral, Q8H PRN **OR** ondansetron (ZOFRAN) injection 4 mg, 4 mg, IntraVENous, Q6H PRN  polyethylene glycol (GLYCOLAX) packet 17 g, 17 g, Oral, Daily PRN  acetaminophen (TYLENOL) tablet 650 mg, 650 mg, Oral, Q6H PRN **OR** acetaminophen (TYLENOL) suppository 650 mg, 650 mg, Rectal, Q6H PRN  pantoprazole (PROTONIX) injection 40 mg, 40 mg, IntraVENous, BID **AND** sodium chloride (PF) 0.9 % injection 10 mL, 10 mL, IntraVENous, BID    Allergies:  Pcn [penicillins]    Social History:    Lives with his wife in two story home ; + fatigue / chest pressure with exertion. Does not require supplemental O2 or use ambulation assistance devices. Remote tobacco use, quit '78   Rare alcohol use  Denies illicit drug use  Full code         Family History:  This information was not obtained at this time as it is found noncontributory secondary to the patients advanced age. REVIEW OF SYSTEMS:     · Constitutional: Denies fevers, chills, night sweats, +fatigue  · HEENT: Denies headaches, nose bleeds, and blurred vision,oral pain, abscess or lesion. · Musculoskeletal: Denies falls, pain to BLE with ambulation and edema to BLE. · Neurological:+  dizziness and lightheadedness, denies numbness and tingling  · Cardiovascular:  Denies chest pain, palpitations, and feelings of heart racing. · Respiratory: Denies orthopnea and PND, cough, JUAREZ  · Gastrointestinal: Denies heartburn, nausea/vomiting, diarrhea and constipation, black/bloody, and tarry stools. · Genitourinary: Denies dysuria and hematuria  · Hematologic: Denies excessive bruising or bleeding  · Lymphatic: Denies lumps and bumps to neck, axilla, breast, and groin      PHYSICAL EXAM:   BP (!) 143/80   Pulse 67   Temp 99.3 °F (37.4 °C) (Oral)   Resp 14   Ht 5' 6\" (1.676 m)   Wt 200 lb (90.7 kg)   SpO2 99%   BMI 32.28 kg/m²    CONST:  Well developed,  male who appears his stated age. Awake, alert, cooperative, no apparent distress  HEENT:   Head- Normocephalic, atraumatic   Eyes- Conjunctivae pink, anicteric  Throat- Oral mucosa pink and moist  Neck-  No stridor, trachea midline, no jugular venous distention. CHEST: Chest symmetrical and non-tender to palpation. RESPIRATORY: Lung sounds clear throughout fields bilaterally. No wheeze or rhonchi noted. CARDIOVASCULAR:     No carotid bruit noted bilaterally   Heart Ausculation- Regular rate and rhythm, + systolic LSB murmur. PV: No lower extremity edema. No varicosities. ABDOMEN: Soft, non-tender to light palpation. Bowel sounds present. MS: Good muscle strength and tone. No atrophy or abnormal movements. : Deferred   SKIN: Warm and dry no statis dermatitis or ulcers   NEURO / PSYCH: Oriented to person, place and time. Speech clear and appropriate. Follows all commands. Pleasant affect     DATA:    ECG: SR HR 78, PVCs   Tele strips:  SR HR 70s with multiple NSVT   Diagnostic:    Labs:   CBC:   Recent Labs     02/22/22  1344 02/23/22  0907   WBC 5.1 4.1*   HGB 7.7* 7.6*   HCT 28.9* 28.1*    164     BMP:   Recent Labs     02/22/22  1344 02/23/22  0907    140   K 4.5 4.0   CO2 25 24   BUN 20 21   CREATININE 1.1 1.0   LABGLOM >60 >60   CALCIUM 9.5 8.6     TSH:   Recent Labs     02/22/22  1344   TSH 1.190     FASTING LIPID PANEL:  Lab Results   Component Value Date    CHOL 123 02/22/2022    HDL 52 02/22/2022    LDLCALC 59 02/22/2022    TRIG 59 02/22/2022     LIVER PROFILE:  Recent Labs     02/22/22  1344 02/23/22  0907   AST 17 14   ALT 13 12   LABALBU 4.3 4.1       Assessment and plan to follow per Dr Jenna Pfeiffer   Electronically signed by [place], [place] on 2/23/2022 at 1:55 PM      I independently performed an evaluation on the patient. I have reviewed the above documentation completed by the advance practitioner. Please see my additional contributions to the HPI, physical exam, assessment and medical decision making. Physical Exam   BP (!) 166/85   Pulse 71   Temp 99 °F (37.2 °C) (Oral)   Resp 16   Ht 5' 6\" (1.676 m)   Wt 200 lb (90.7 kg)   SpO2 100%   BMI 32.28 kg/m²   Constitutional: Oriented to person, place, and time. Well-developed and well-nourished. No distress. Head: Normocephalic and atraumatic. Eyes: EOM are normal. Pupils are equal, round, and reactive to light. Neck: Normal range of motion. Neck supple. No hepatojugular reflux and no JVD present. Carotid bruit is not present. No tracheal deviation present. No thyromegaly present. Cardiovascular: Normal rate, regular rhythm, normal heart sounds and intact distal pulses. Exam reveals no gallop and no friction rub. No murmur heard. Pulmonary/Chest: Effort normal and breath sounds normal. No respiratory distress. No wheezes. No rales. No tenderness.    Abdominal: Soft. Bowel sounds are normal. No distension and no mass. No tenderness. No rebound and no guarding. Musculoskeletal: Normal range of motion. No edema and no tenderness. Lymphadenopathy:   No cervical adenopathy. Neurological: Alert and oriented to person, place, and time. Skin: Skin is warm and dry. No rash noted. Not diaphoretic. No erythema. Psychiatric: Normal mood and affect. Behavior is normal.     See accompanying documentation for full consult. ASSESSMENT AND PLAN:  1. NSVT:    Titrate BB. Mag. Maintain K.    Echo. Stress when anemia stable. 2. JUAREZ/Chest symptoms    Echo. Stress when anemia stable. 3. PAFlutter: In sinus. BB. Typically on elquis, held due to anemia. Monitor. 4. VHD:Mild AI and MR on prior echo. Echo. 5. Anemia: Per GI and primary service. 6. HTN: Observe. Titrate medications as needed. 7. Lipids: Statin. 8. TAA: 42 mm. Observe. Kathleen Phillips D.O.   Cardiologist  Cardiology, [facility]

## 2022-02-23 NOTE — CONSULTS
I independently performed an evaluation on the patient. I have reviewed the above documentation completed by the advance practitioner. Please see my additional contributions to the HPI, physical exam, assessment and medical decision making. Physical Exam   BP (!) 166/85   Pulse 71   Temp 99 °F (37.2 °C) (Oral)   Resp 16   Ht 5' 6\" (1.676 m)   Wt 200 lb (90.7 kg)   SpO2 100%   BMI 32.28 kg/m²   Constitutional: Oriented to person, place, and time. Well-developed and well-nourished. No distress. Head: Normocephalic and atraumatic. Eyes: EOM are normal. Pupils are equal, round, and reactive to light. Neck: Normal range of motion. Neck supple. No hepatojugular reflux and no JVD present. Carotid bruit is not present. No tracheal deviation present. No thyromegaly present. Cardiovascular: Normal rate, regular rhythm, normal heart sounds and intact distal pulses. Exam reveals no gallop and no friction rub. No murmur heard. Pulmonary/Chest: Effort normal and breath sounds normal. No respiratory distress. No wheezes. No rales. No tenderness. Abdominal: Soft. Bowel sounds are normal. No distension and no mass. No tenderness. No rebound and no guarding. Musculoskeletal: Normal range of motion. No edema and no tenderness. Lymphadenopathy:   No cervical adenopathy. Neurological: Alert and oriented to person, place, and time. Skin: Skin is warm and dry. No rash noted. Not diaphoretic. No erythema. Psychiatric: Normal mood and affect. Behavior is normal.     See accompanying documentation for full consult. ASSESSMENT AND PLAN:  1. NSVT:    Titrate BB. Mag. Maintain K.    Echo. Stress when anemia stable. 2. JUAREZ/Chest symptoms    Echo. Stress when anemia stable. 3. PAFlutter: In sinus. BB. Typically on elquis, held due to anemia. Monitor. 4. VHD:Mild AI and MR on prior echo. Echo. 5. Anemia: Per GI and primary service. 6. HTN: Observe.  Titrate medications as needed. 7. Lipids: Statin. 8. TAA: 42 mm. Observe. Hansel Austin D.O.   Cardiologist  Cardiology, [facility]

## 2022-02-23 NOTE — ED NOTES
Okay to transfer to the floor without tele per Dr. Jules Navarrete.      Demi Rahman RN  02/23/22 9629

## 2022-02-23 NOTE — CONSULTS
Consult received  Charts/labs reviewed  Extensive review of medical record  See orders; monitor stools, NPO after midnight, ok for clears today, serial H&H, transfuse per PCP, hold anticoagulants, occult stool, protonix 40 mg BID, CBC/CMP daily, INR tomorrow am  Full consult to follow      Discussed with Dr. Lia Schaumann, APRN - CNP , APRN NP-C 2:57 PM for Dr. Desiree Desir

## 2022-02-23 NOTE — ED PROVIDER NOTES
Chief Complaint   Patient presents with    Fatigue    Abnormal Lab     low hgb 7.7, denies bleeding, on elliquis        Patient is a 75-year-old male presents today for anemia. He was at his PCPs office yesterday and had routine blood work which showed he was anemic with a hemoglobin of 7.7. He is anticoagulated on Eliquis. He has a history of ascending aortic aneurysm that has been stable. He states he has mild chest tightness intermittently and generalized fatigue, however the symptoms are not new. He denies lightheadedness, dizziness, numbness, tingling or weakness in extremities. Denies syncope. Symptoms have been intermittent, mild in severity. Denies hematemesis, denies dark tarry stools or bright red blood per rectum. States he did have a colonoscopy several months ago which was normal.    The history is provided by the patient. No  was used. Review of Systems   Constitutional: Positive for fatigue. Negative for chills and fever. HENT: Negative for ear pain, sinus pressure and sore throat. Eyes: Negative for pain, discharge and redness. Respiratory: Negative for cough, shortness of breath and wheezing. Cardiovascular: Positive for chest pain. Negative for palpitations and leg swelling. Gastrointestinal: Negative for abdominal pain, diarrhea, nausea and vomiting. Genitourinary: Negative for dysuria and frequency. Musculoskeletal: Negative for arthralgias and back pain. Skin: Negative for rash and wound. Neurological: Negative for weakness and headaches. Hematological: Negative for adenopathy. Psychiatric/Behavioral: Negative for behavioral problems and confusion. All other systems reviewed and are negative. Physical Exam  Vitals and nursing note reviewed. Constitutional:       General: He is not in acute distress. Appearance: Normal appearance. He is well-developed. He is not ill-appearing.    HENT:      Head: Normocephalic and atraumatic. Eyes:      Pupils: Pupils are equal, round, and reactive to light. Cardiovascular:      Rate and Rhythm: Normal rate and regular rhythm. Pulses: Normal pulses. Heart sounds: Normal heart sounds. No murmur heard. Pulmonary:      Effort: Pulmonary effort is normal. No respiratory distress. Breath sounds: Normal breath sounds. No wheezing or rales. Abdominal:      General: Bowel sounds are normal.      Palpations: Abdomen is soft. Tenderness: There is no abdominal tenderness. There is no guarding or rebound. Musculoskeletal:         General: No tenderness. Cervical back: Normal range of motion and neck supple. Right lower leg: No edema. Left lower leg: No edema. Skin:     General: Skin is warm and dry. Capillary Refill: Capillary refill takes less than 2 seconds. Neurological:      General: No focal deficit present. Mental Status: He is alert and oriented to person, place, and time. Cranial Nerves: No cranial nerve deficit. Coordination: Coordination normal.          Procedures     Labs Reviewed   CBC WITH AUTO DIFFERENTIAL - Abnormal; Notable for the following components:       Result Value    WBC 4.1 (*)     Hemoglobin 7.6 (*)     Hematocrit 28.1 (*)     MCV 65.0 (*)     MCH 17.6 (*)     MCHC 27.0 (*)     RDW 19.8 (*)     Metamyelocytes Relative 1.8 (*)     All other components within normal limits   COMPREHENSIVE METABOLIC PANEL W/ REFLEX TO MG FOR LOW K - Abnormal; Notable for the following components:    Glucose 100 (*)     Total Bilirubin 1.4 (*)     All other components within normal limits   TROPONIN - Abnormal; Notable for the following components:    Troponin, High Sensitivity 14 (*)     All other components within normal limits   TYPE AND SCREEN     CTA CHEST W CONTRAST   Final Result   1. Stable aneurysmal dilatation of the ascending thoracic aorta measuring 4.2   x 4.1 cm.   This finding is unchanged compared to prior study of 08/09/2021   and 07/30/2020. Yearly follow-up CT scan is recommended to confirm stability. 2. There is no aneurysm of the aortic arch or descending thoracic aorta. 3. The lungs are clear. RECOMMENDATIONS:   Yearly follow-up CT scan is recommended         CTA ABDOMEN PELVIS W CONTRAST   Final Result   1. There is no abdominal aortic aneurysm or dissection   2. Cholelithiasis. There is no evidence of acute cholecystitis. 3. Colonic diverticulosis. There are no findings of diverticulitis. EKG #1:   I personally interpreted this EKG  Time:  0844    Rate: 78  Rhythm: Sinus. Interpretation: Sinus rhythm, no ST elevation or T inversion. MDM  Number of Diagnoses or Management Options  Anemia, unspecified type  Chronic anticoagulation  Thoracic aortic aneurysm without rupture [facility])  Diagnosis management comments: Patient is a 49-year-old male presents today for abnormal outpatient labs. Noted to be anemic yesterday. Is chronically anticoagulated. Blood work from 1 year ago shows hemoglobin stable at 11, repeat today is 7.7. He denies active bleeding at this time. Vitals are stable. Work-up is otherwise benign. With patient being anemic, chronically anticoagulated we did recommend mission the hospital for GI or surgery consult for possible endoscopy. Patient understands and agrees with plan. Amount and/or Complexity of Data Reviewed  Clinical lab tests: reviewed  Tests in the radiology section of CPT®: reviewed  Tests in the medicine section of CPT®: reviewed                  --------------------------------------------- PAST HISTORY ---------------------------------------------  Past Medical History:  has a past medical history of Hyperlipidemia and Hypertension. Past Surgical History:  has a past surgical history that includes Kidney removal (Right); hernia repair; Kidney surgery; eye surgery (Left, 05/2018); transesophageal echocardiogram (05/30/2018);  Cardioversion (05/30/2018); pr fragment kidney stone/ eswl (Right, 10/25/2018); and Lithotripsy. Social History:  reports that he quit smoking about 43 years ago. He has a 0.13 pack-year smoking history. He has never used smokeless tobacco. He reports current alcohol use. He reports that he does not use drugs. Family History: family history includes Diabetes in his brother; High Blood Pressure in his father. The patients home medications have been reviewed.     Allergies: Pcn [penicillins]    -------------------------------------------------- RESULTS -------------------------------------------------    LABS:  Results for orders placed or performed during the hospital encounter of 02/23/22   CBC with Auto Differential   Result Value Ref Range    WBC 4.1 (L) 4.5 - 11.5 E9/L    RBC 4.32 3.80 - 5.80 E12/L    Hemoglobin 7.6 (L) 12.5 - 16.5 g/dL    Hematocrit 28.1 (L) 37.0 - 54.0 %    MCV 65.0 (L) 80.0 - 99.9 fL    MCH 17.6 (L) 26.0 - 35.0 pg    MCHC 27.0 (L) 32.0 - 34.5 %    RDW 19.8 (H) 11.5 - 15.0 fL    Platelets 566 098 - 821 E9/L    MPV NOT CALC 7.0 - 12.0 fL    Neutrophils % 51.8 43.0 - 80.0 %    Lymphocytes % 36.8 20.0 - 42.0 %    Monocytes % 7.0 2.0 - 12.0 %    Eosinophils % 2.6 0.0 - 6.0 %    Basophils % 0.0 0.0 - 2.0 %    Neutrophils Absolute 2.21 1.80 - 7.30 E9/L    Lymphocytes Absolute 1.52 1.50 - 4.00 E9/L    Monocytes Absolute 0.29 0.10 - 0.95 E9/L    Eosinophils Absolute 0.11 0.05 - 0.50 E9/L    Basophils Absolute 0.00 0.00 - 0.20 E9/L    Metamyelocytes Relative 1.8 (H) 0.0 - 1.0 %    nRBC 0.0 /100 WBC    Anisocytosis 2+     Polychromasia 1+     Hypochromia 2+     Poikilocytes 3+     Schistocytes 1+     Acanthocytes 2+     [place] Cells 3+     Ovalocytes 2+     Target Cells 1+    Comprehensive Metabolic Panel w/ Reflex to MG   Result Value Ref Range    Sodium 140 132 - 146 mmol/L    Potassium reflex Magnesium 4.0 3.5 - 5.0 mmol/L    Chloride 106 98 - 107 mmol/L    CO2 24 22 - 29 mmol/L    Anion Gap 10 7 - 16 mmol/L    Glucose 100 (H) 74 - 99 mg/dL    BUN 21 6 - 23 mg/dL    CREATININE 1.0 0.7 - 1.2 mg/dL    GFR Non-African American >60 >=60 mL/min/1.73    GFR African American >60     Calcium 8.6 8.6 - 10.2 mg/dL    Total Protein 6.5 6.4 - 8.3 g/dL    Albumin 4.1 3.5 - 5.2 g/dL    Total Bilirubin 1.4 (H) 0.0 - 1.2 mg/dL    Alkaline Phosphatase 49 40 - 129 U/L    ALT 12 0 - 40 U/L    AST 14 0 - 39 U/L   Troponin   Result Value Ref Range    Troponin, High Sensitivity 14 (H) 0 - 11 ng/L   EKG 12 Lead   Result Value Ref Range    Ventricular Rate 78 BPM    Atrial Rate 78 BPM    P-R Interval 200 ms    QRS Duration 98 ms    Q-T Interval 424 ms    QTc Calculation (Bazett) 483 ms    P Axis 74 degrees    R Axis -28 degrees    T Axis 79 degrees       RADIOLOGY:  CTA CHEST W CONTRAST   Final Result   1. Stable aneurysmal dilatation of the ascending thoracic aorta measuring 4.2   x 4.1 cm. This finding is unchanged compared to prior study of 08/09/2021   and 07/30/2020. Yearly follow-up CT scan is recommended to confirm stability. 2. There is no aneurysm of the aortic arch or descending thoracic aorta. 3. The lungs are clear. RECOMMENDATIONS:   Yearly follow-up CT scan is recommended         CTA ABDOMEN PELVIS W CONTRAST   Final Result   1. There is no abdominal aortic aneurysm or dissection   2. Cholelithiasis. There is no evidence of acute cholecystitis. 3. Colonic diverticulosis. There are no findings of diverticulitis.                 ------------------------- NURSING NOTES AND VITALS REVIEWED ---------------------------  Date / Time Roomed:  2/23/2022  8:24 AM  ED Bed Assignment:  10/10    The nursing notes within the ED encounter and vital signs as below have been reviewed.      Patient Vitals for the past 24 hrs:   BP Temp Temp src Pulse Resp SpO2 Height Weight   02/23/22 1245 (!) 143/80 99.3 °F (37.4 °C) Oral 67 14 99 % -- --   02/23/22 1029 (!) 157/87 -- -- 80 -- 99 % -- --   02/23/22 0825 (!) 180/85 98 °F (36.7 °C) -- 78 14 99 % 5' 6\" (1.676 m) 200 lb (90.7 kg)       Oxygen Saturation Interpretation: Normal    ------------------------------------------ PROGRESS NOTES ------------------------------------------    Counseling:  I have spoken with the patient and discussed todays results, in addition to providing specific details for the plan of care and counseling regarding the diagnosis and prognosis. Their questions are answered at this time and they are agreeable with the plan of admission.    --------------------------------- ADDITIONAL PROVIDER NOTES ---------------------------------  Consultations:  Spoke with [facility].  Discussed case. They will admit the patient. This patient's ED course included: a personal history and physicial examination    This patient has remained hemodynamically stable during their ED course. Medications   iopamidol (ISOVUE-370) 76 % injection 75 mL (75 mLs IntraVENous Given 2/23/22 1102)         Diagnosis:  1. Anemia, unspecified type    2. Chronic anticoagulation    3. Thoracic aortic aneurysm without rupture (HCC)        Disposition:  Patient's disposition: Admit to telemetry  Patient's condition is stable.              Joycelyn Ng DO  Resident  02/23/22 8304

## 2022-02-23 NOTE — H&P
[facility] History and Physical      CHIEF COMPLAINT:  Anemia- sent in for abnormal labs     History of Present Illness:     Patient is a 73 yo male patient who follows with PCP Dr. Kathe Menchaca with a past medical history of atrial fibrillation on chronic eliquis, HTN, HLD and thoracic aneurysm. Patient presented to the ER due to abnormal labs. He saw his PCP yesterday for routine visit and had lab work. He was called today to come to ER. Reporting he has always been told that his hg runs borderline low. Denies h/o GI bleed. He had colonoscopy over 10 years ago with Dr. Froilan Riley. Denies any dark tarry stools or visible blood. He did have PRBC transfusion over 20 years ago when he had kidney surgery. Reporting since November he has had exertional dyspnea when walking up steps or exerting himself and at times he feels like his chest is tight. . Symptoms moderate, ongoing and progressive. He has been following with his cardiologist Dr. Celina Strickland and was told he has a leaky valve. Reporting h/o mitral regurgitation. He is supposed to have an outpatient stress test and echocardiogram next month. Reporting for over 2 years he does get dizzy at times. Reporting other than exertional sob at times. He has been in pretty good shape. His wife had neck surgery recently so pt has been taking care of her and helping her recover. Patient awake and alert- resting in room in no acute distress. Asking when he can eat. Reporting he \" would love a donut right now. \" Currently denies chest pain, sob, nausea, vomiting, dysuria, hematuria, hematochezia. Weight has been stable. During exam nursing did come to room to state that pt had 2 several beat runs of non sustained vtach. Pt denied symptoms.         Informant(s) for H&P:patient/ chart review     REVIEW OF SYSTEMS:  A comprehensive review of systems was negative except for: what is in the HPI      PMH:  Past Medical History:   Diagnosis Date    Hyperlipidemia     Hypertension        Surgical History:  Past Surgical History:   Procedure Laterality Date    CARDIOVERSION  05/30/2018    EYE SURGERY Left 05/2018    cataracts, retinal tear repair.  HERNIA REPAIR      KIDNEY REMOVAL Right     Half of kidney removed.  KIDNEY SURGERY      LITHOTRIPSY      [place] FRAGMENT KIDNEY STONE/ ESWL Right 10/25/2018    ESWL EXTRACORPEAL SHOCK WAVE LITHOTRIPSY WITH CYSTO AND RIGHT STENT PLACEMENT performed by Shayna Chu MD at [facility] TRANSESOPHAGEAL ECHOCARDIOGRAM  05/30/2018       Medications Prior to Admission:    Prior to Admission medications    Medication Sig Start Date End Date Taking? Authorizing Provider   acetaminophen (TYLENOL 8 HOUR ARTHRITIS PAIN) 650 MG extended release tablet Take 650 mg by mouth 2 times daily as needed for Pain   Yes Historical Provider, MD   atorvastatin (LIPITOR) 40 MG tablet Take 40 mg by mouth nightly   Yes Historical Provider, MD   apixaban (ELIQUIS) 5 MG TABS tablet Take 5 mg by mouth 2 times daily   Yes Historical Provider, MD   pantoprazole (PROTONIX) 40 MG tablet Take 40 mg by mouth nightly   Yes Historical Provider, MD   ramipril (ALTACE) 10 MG capsule Take 10 mg by mouth 2 times daily   Yes Historical Provider, MD   metoprolol tartrate (LOPRESSOR) 25 MG tablet Take 1 tablet by mouth 2 times daily 5/27/21  Yes Willian Swartz,    cetirizine (ZYRTEC) 5 MG tablet Take 5 mg by mouth nightly    Yes Historical Provider, MD       Allergies:    Pcn [penicillins]    Social History:    reports that he quit smoking about 43 years ago. He has a 0.13 pack-year smoking history. He has never used smokeless tobacco. He reports current alcohol use. He reports that he does not use drugs. Denies tobacco  Denies illicits  Occasional illicit use    Family History:   family history includes Diabetes in his brother; High Blood Pressure in his father.        PHYSICAL EXAM:  Vitals:  BP (!) 143/80   Pulse 67   Temp 99.3 °F (37.4 °C) (Oral)   Resp 14 Ht 5' 6\" (1.676 m)   Wt 200 lb (90.7 kg)   SpO2 99%   BMI 32.28 kg/m²     General Appearance: alert and oriented to person, place and time   Skin: warm and dry  Head: normocephalic and atraumatic  Neck: neck supple and non tender without mass   Pulmonary/Chest: diminished throughout to auscultation   Cardiovascular: normal rate, normal S1 and S2 and no carotid bruits  Abdomen: soft, non-tender, non-distended, normal bowel sounds, no masses or organomegaly  Extremities: no cyanosis, no clubbing and no edema  Neurologic: speech normal        LABS:  Recent Labs     02/22/22  1344 02/23/22  0907    140   K 4.5 4.0    106   CO2 25 24   BUN 20 21   CREATININE 1.1 1.0   GLUCOSE 85 100*   CALCIUM 9.5 8.6       Recent Labs     02/22/22  1344 02/23/22  0907   WBC 5.1 4.1*   RBC 4.39 4.32   HGB 7.7* 7.6*   HCT 28.9* 28.1*   MCV 65.8* 65.0*   MCH 17.5* 17.6*   MCHC 26.6* 27.0*   RDW 20.4* 19.8*    164   MPV NOT CALC NOT CALC       No results for input(s): POCGLU in the last 72 hours. Radiology:   CTA CHEST W CONTRAST   Final Result   1. Stable aneurysmal dilatation of the ascending thoracic aorta measuring 4.2   x 4.1 cm. This finding is unchanged compared to prior study of 08/09/2021   and 07/30/2020. Yearly follow-up CT scan is recommended to confirm stability. 2. There is no aneurysm of the aortic arch or descending thoracic aorta. 3. The lungs are clear. RECOMMENDATIONS:   Yearly follow-up CT scan is recommended         CTA ABDOMEN PELVIS W CONTRAST   Final Result   1. There is no abdominal aortic aneurysm or dissection   2. Cholelithiasis. There is no evidence of acute cholecystitis. 3. Colonic diverticulosis. There are no findings of diverticulitis. ASSESSMENT:      Principal Problem:    Anemia  Resolved Problems:    * No resolved hospital problems. *      PLAN:    1.   Symptomatic acute microcytic anemia r/o GI bleed: Pt presented to the ER sent in by PCP due to abnormal lab work drawn yesterday- showing anemia with hg 7.7. He is on chronic anticoagulation- eliquis for h/o afib. He has a history of ascending aortic aneurysm that has been stable. He reports fatigue and mild chest tightness- though symptoms not new. Denies dark tarry stools/ visible blood in stool. H/o colonoscopy several months ago which was normal.  Hg on presentation 7.6. Last labwork in computer shows hg 11.7 from Feb 2021. CTA abd- diverticulosis/ CTA chest - stable thoracic aneurysm 4.2 x 4.1 cm- unchanged due to imaging 8/2021. Check anemia panel. Hold eliquis. Consult GI. Npo for now. 2. Atrial fibrillation: HR controlled. EKG in ER showing SR. Hold eliquis. Continue BB    3. HTN: on ace/ BB-continue- monitor BP    4. HLD: statin    5. Thoracic aneurysm: stable. CTA showing 4.2 x 4.1 cm- which is similar to imaging in August 2021    6. Non sustained vtach: during exam- pt hooked up to monitor once he arrived to floor and during exam had 2 runs of NSVT. Reporting he has been following with cardiology closely and is scheduled to have outpt stress test and echo. Also reporting exertional sob/ chest tightness. Consult cardiology. Code Status:  FULL  DVT prophylaxis: PCDs- hold anticoagulation- eliquis      NOTE: This report was transcribed using voice recognition software. Every effort was made to ensure accuracy; however, inadvertent computerized transcription errors may be present.   Electronically signed by BRIAN Lawler on 2/23/2022 at 1:10 PM

## 2022-02-24 ENCOUNTER — ANESTHESIA EVENT (OUTPATIENT)
Dept: ENDOSCOPY | Age: 72
End: 2022-02-24
Payer: MEDICARE

## 2022-02-24 LAB
ACANTHOCYTES: ABNORMAL
ANION GAP SERPL CALCULATED.3IONS-SCNC: 9 MMOL/L (ref 7–16)
ANISOCYTOSIS: ABNORMAL
ANISOCYTOSIS: ABNORMAL
BASOPHILS ABSOLUTE: 0.04 E9/L (ref 0–0.2)
BASOPHILS ABSOLUTE: 0.04 E9/L (ref 0–0.2)
BASOPHILS RELATIVE PERCENT: 0.6 % (ref 0–2)
BASOPHILS RELATIVE PERCENT: 0.7 % (ref 0–2)
BUN BLDV-MCNC: 16 MG/DL (ref 6–23)
CALCIUM SERPL-MCNC: 9.1 MG/DL (ref 8.6–10.2)
CHLORIDE BLD-SCNC: 104 MMOL/L (ref 98–107)
CO2: 25 MMOL/L (ref 22–29)
CREAT SERPL-MCNC: 1 MG/DL (ref 0.7–1.2)
EOSINOPHILS ABSOLUTE: 0.04 E9/L (ref 0.05–0.5)
EOSINOPHILS ABSOLUTE: 0.05 E9/L (ref 0.05–0.5)
EOSINOPHILS RELATIVE PERCENT: 0.6 % (ref 0–6)
EOSINOPHILS RELATIVE PERCENT: 0.9 % (ref 0–6)
GFR AFRICAN AMERICAN: >60
GFR NON-AFRICAN AMERICAN: >60 ML/MIN/1.73
GLUCOSE BLD-MCNC: 94 MG/DL (ref 74–99)
HCT VFR BLD CALC: 28.9 % (ref 37–54)
HCT VFR BLD CALC: 30.5 % (ref 37–54)
HEMOGLOBIN: 7.8 G/DL (ref 12.5–16.5)
HEMOGLOBIN: 8.3 G/DL (ref 12.5–16.5)
HYPOCHROMIA: ABNORMAL
HYPOCHROMIA: ABNORMAL
IMMATURE GRANULOCYTES #: 0.01 E9/L
IMMATURE GRANULOCYTES #: 0.02 E9/L
IMMATURE GRANULOCYTES %: 0.2 % (ref 0–5)
IMMATURE GRANULOCYTES %: 0.3 % (ref 0–5)
INR BLD: 1.3
LYMPHOCYTES ABSOLUTE: 0.86 E9/L (ref 1.5–4)
LYMPHOCYTES ABSOLUTE: 1.07 E9/L (ref 1.5–4)
LYMPHOCYTES RELATIVE PERCENT: 13.9 % (ref 20–42)
LYMPHOCYTES RELATIVE PERCENT: 19.1 % (ref 20–42)
MCH RBC QN AUTO: 17.4 PG (ref 26–35)
MCH RBC QN AUTO: 17.6 PG (ref 26–35)
MCHC RBC AUTO-ENTMCNC: 27 % (ref 32–34.5)
MCHC RBC AUTO-ENTMCNC: 27.2 % (ref 32–34.5)
MCV RBC AUTO: 64.4 FL (ref 80–99.9)
MCV RBC AUTO: 64.6 FL (ref 80–99.9)
MONOCYTES ABSOLUTE: 0.5 E9/L (ref 0.1–0.95)
MONOCYTES ABSOLUTE: 0.54 E9/L (ref 0.1–0.95)
MONOCYTES RELATIVE PERCENT: 8.1 % (ref 2–12)
MONOCYTES RELATIVE PERCENT: 9.7 % (ref 2–12)
NEUTROPHILS ABSOLUTE: 3.88 E9/L (ref 1.8–7.3)
NEUTROPHILS ABSOLUTE: 4.71 E9/L (ref 1.8–7.3)
NEUTROPHILS RELATIVE PERCENT: 69.4 % (ref 43–80)
NEUTROPHILS RELATIVE PERCENT: 76.5 % (ref 43–80)
OVALOCYTES: ABNORMAL
OVALOCYTES: ABNORMAL
PDW BLD-RTO: 19.7 FL (ref 11.5–15)
PDW BLD-RTO: 19.9 FL (ref 11.5–15)
PLATELET # BLD: 166 E9/L (ref 130–450)
PLATELET # BLD: 212 E9/L (ref 130–450)
PMV BLD AUTO: ABNORMAL FL (ref 7–12)
PMV BLD AUTO: ABNORMAL FL (ref 7–12)
POIKILOCYTES: ABNORMAL
POIKILOCYTES: ABNORMAL
POLYCHROMASIA: ABNORMAL
POTASSIUM SERPL-SCNC: 4.2 MMOL/L (ref 3.5–5)
PROTHROMBIN TIME: 13.6 SEC (ref 9.3–12.4)
RBC # BLD: 4.49 E12/L (ref 3.8–5.8)
RBC # BLD: 4.72 E12/L (ref 3.8–5.8)
SCHISTOCYTES: ABNORMAL
SCHISTOCYTES: ABNORMAL
SODIUM BLD-SCNC: 138 MMOL/L (ref 132–146)
TEAR DROP CELLS: ABNORMAL
WBC # BLD: 5.6 E9/L (ref 4.5–11.5)
WBC # BLD: 6.2 E9/L (ref 4.5–11.5)

## 2022-02-24 PROCEDURE — 6360000002 HC RX W HCPCS: Performed by: INTERNAL MEDICINE

## 2022-02-24 PROCEDURE — 99233 SBSQ HOSP IP/OBS HIGH 50: CPT | Performed by: INTERNAL MEDICINE

## 2022-02-24 PROCEDURE — A4216 STERILE WATER/SALINE, 10 ML: HCPCS | Performed by: INTERNAL MEDICINE

## 2022-02-24 PROCEDURE — APPSS30 APP SPLIT SHARED TIME 16-30 MINUTES: Performed by: NURSE PRACTITIONER

## 2022-02-24 PROCEDURE — C9113 INJ PANTOPRAZOLE SODIUM, VIA: HCPCS | Performed by: INTERNAL MEDICINE

## 2022-02-24 PROCEDURE — 85025 COMPLETE CBC W/AUTO DIFF WBC: CPT

## 2022-02-24 PROCEDURE — 6370000000 HC RX 637 (ALT 250 FOR IP): Performed by: INTERNAL MEDICINE

## 2022-02-24 PROCEDURE — 80048 BASIC METABOLIC PNL TOTAL CA: CPT

## 2022-02-24 PROCEDURE — 36415 COLL VENOUS BLD VENIPUNCTURE: CPT

## 2022-02-24 PROCEDURE — 96376 TX/PRO/DX INJ SAME DRUG ADON: CPT

## 2022-02-24 PROCEDURE — 85610 PROTHROMBIN TIME: CPT

## 2022-02-24 PROCEDURE — G0378 HOSPITAL OBSERVATION PER HR: HCPCS

## 2022-02-24 PROCEDURE — 2060000000 HC ICU INTERMEDIATE R&B

## 2022-02-24 PROCEDURE — 2580000003 HC RX 258: Performed by: INTERNAL MEDICINE

## 2022-02-24 RX ORDER — SODIUM CHLORIDE 9 MG/ML
500 INJECTION, SOLUTION INTRAVENOUS CONTINUOUS
Status: DISCONTINUED | OUTPATIENT
Start: 2022-02-25 | End: 2022-02-25 | Stop reason: HOSPADM

## 2022-02-24 RX ADMIN — SODIUM CHLORIDE, PRESERVATIVE FREE 10 ML: 5 INJECTION INTRAVENOUS at 09:23

## 2022-02-24 RX ADMIN — SODIUM CHLORIDE, PRESERVATIVE FREE 10 ML: 5 INJECTION INTRAVENOUS at 09:07

## 2022-02-24 RX ADMIN — SODIUM CHLORIDE, PRESERVATIVE FREE 10 ML: 5 INJECTION INTRAVENOUS at 09:09

## 2022-02-24 RX ADMIN — METOPROLOL TARTRATE 50 MG: 50 TABLET, FILM COATED ORAL at 20:50

## 2022-02-24 RX ADMIN — ACETAMINOPHEN 650 MG: 325 TABLET ORAL at 20:50

## 2022-02-24 RX ADMIN — FERROUS SULFATE TAB 325 MG (65 MG ELEMENTAL FE) 325 MG: 325 (65 FE) TAB at 09:04

## 2022-02-24 RX ADMIN — SODIUM CHLORIDE, PRESERVATIVE FREE 10 ML: 5 INJECTION INTRAVENOUS at 20:59

## 2022-02-24 RX ADMIN — METOPROLOL TARTRATE 50 MG: 50 TABLET, FILM COATED ORAL at 09:04

## 2022-02-24 RX ADMIN — ACETAMINOPHEN 650 MG: 325 TABLET ORAL at 09:11

## 2022-02-24 RX ADMIN — RAMIPRIL 10 MG: 5 CAPSULE ORAL at 20:50

## 2022-02-24 RX ADMIN — SODIUM CHLORIDE, PRESERVATIVE FREE 10 ML: 5 INJECTION INTRAVENOUS at 20:51

## 2022-02-24 RX ADMIN — PANTOPRAZOLE SODIUM 40 MG: 40 INJECTION, POWDER, FOR SOLUTION INTRAVENOUS at 20:59

## 2022-02-24 RX ADMIN — RAMIPRIL 10 MG: 5 CAPSULE ORAL at 09:04

## 2022-02-24 RX ADMIN — PANTOPRAZOLE SODIUM 40 MG: 40 INJECTION, POWDER, FOR SOLUTION INTRAVENOUS at 09:04

## 2022-02-24 ASSESSMENT — PAIN DESCRIPTION - LOCATION: LOCATION: BACK;NECK

## 2022-02-24 ASSESSMENT — PAIN SCALES - GENERAL
PAINLEVEL_OUTOF10: 2
PAINLEVEL_OUTOF10: 5
PAINLEVEL_OUTOF10: 0
PAINLEVEL_OUTOF10: 0
PAINLEVEL_OUTOF10: 5
PAINLEVEL_OUTOF10: 0

## 2022-02-24 ASSESSMENT — PAIN DESCRIPTION - ONSET: ONSET: ON-GOING

## 2022-02-24 ASSESSMENT — PAIN DESCRIPTION - PAIN TYPE: TYPE: CHRONIC PAIN

## 2022-02-24 ASSESSMENT — PAIN DESCRIPTION - FREQUENCY: FREQUENCY: INTERMITTENT

## 2022-02-24 NOTE — PROGRESS NOTES
P Quality Flow/Interdisciplinary Rounds Progress Note        Quality Flow Rounds held on February 24, 2022    Disciplines Attending:  Bedside Nurse, ,  and Nursing Unit Leadership    Soila Bustos was admitted on 2/23/2022  8:24 AM    Anticipated Discharge Date:  Expected Discharge Date: 03/19/22    Disposition:    Bennett Score:  Bennett Scale Score: 22    Readmission Risk              Risk of Unplanned Readmission:  12           Discussed patient goal for the day, patient clinical progression, and barriers to discharge. The following Goal(s) of the Day/Commitment(s) have been identified:  Check GI plan-anticipate EGD.        Snow Lester RN  February 24, 2022

## 2022-02-24 NOTE — PROGRESS NOTES
Central monitoring notifying nursing that patient continues to have multiple runs of v-tach. Typically varying from 5-8 beats.     Electronically signed by Sharda Wheat RN on 2/24/2022 at 7:37 AM

## 2022-02-24 NOTE — PROGRESS NOTES
EGD tomorrow GI input appreciated. 2. Atrial Fibrillation- HR controlled. EKG with SR. Holding Eliquis 2/2 possible GI bleed. SCDs. Telemetry monitoring. Continue Metoprolol. 3.  NSVT- 2 runs of NSVT. Mg+ 1.9. K+4.0 Continue BB/titrating per cardiology. Telemetry monitoring. ECHO/Stress. Cardiology input appreciated. 4. HTN- Continue Metoprolol/ramipril. Follow adjust as needed  5. Thoracic Aneurysm- CTA 4.2x4.1cm Stable since last CT. Will need continue follow up. NOTE: This report was transcribed using voice recognition software. Every effort was made to ensure accuracy; however, inadvertent computerized transcription errors may be present. Electronically signed by Ngoc Bill CNP on 2/24/2022 at 8:36 AM     Addendum: I have personally participated in the history, exam, medical decision making with Alyssia Harrell on the date of service and I agree with all of the pertinent clinical information unless otherwise noted. I have also reviewed and agree with the past medical, family, and social history unless otherwise noted. Patient was admitted with anemia    PHYSICAL EXAM:  Vitals:  BP (!) 167/89   Pulse 76   Temp 98.1 °F (36.7 °C) (Oral)   Resp 18   Ht 5' 6\" (1.676 m)   Wt 201 lb 6.4 oz (91.4 kg)   SpO2 100%   BMI 32.51 kg/m²   Gen: awake, alert, NAD  Lungs: clear to auscultation bilaterally no crackles no wheezing. Heart: RRR, no murmur   Abdomen: soft nontender nondistended positive bowel sounds. Extremities: full range of motion no peripheral edema. Impression:  Principal Problem:    Anemia  Resolved Problems:    * No resolved hospital problems. *      My findings/plan include:    Patient has acute on chronic anemia. Baseline is around 11. Fecal occult was negative. Eliquis is held for atrial fibrillation. Patient is to have EGD tomorrow. NOTE: This report was transcribed using voice recognition software.  Every effort was made to ensure accuracy; however, inadvertent computerized transcription errors may be present. Electronically signed by Maximus London DO on 2/24/2022 at 2:44 PM                    g[address].

## 2022-02-24 NOTE — CONSULTS
Gastroenterology Consult Note   SO Man NP-C with Sonia Proctor M.D. Consult Note        Date of Service: 2/24/2022  Reason for Consult: Anemia- on anticoagulation  Requesting Physician: SO Rivero, CNP    CHIEF COMPLAINT:  Abnormal labs    History Obtained From: Patient and EMR    HISTORY OF PRESENT ILLNESS:       Coni Vergara is a 74 y.o. male with significant past medical history of hypertension and hyperlipidemia admitted via ED for anemia. Pt reports he went for his annual physical with his PCP, Dr. Tracy Capps on Tuesday and received a phone call yesterday stating his hemoglobin was low and he showed go to the ED for evaluation and blood transfusion. He is currently on Eliquis. Patient states he has been tolerating diet, denies abdominal pain, nausea or vomiting. Bowel movements daily described as formed and brown. Denies melena or hematochezia. Occult stool negative. Screening colonoscopy with Dr Tessie Lindsey 8/2021- large internal and external hemorrhoids and diverticulosis. Admission labs hemoglobin 7.6; hematocrit 28.1; MCV 65; MCHC 17.6; bilirubin 1.4; glucose 100. CTA ABDOMEN PELVIS W CONTRAST- There is no abdominal aortic aneurysm or dissection 2. Cholelithiasis. There is no evidence of acute cholecystitis. 3. Colonic diverticulosis. There are no findings of diverticulitis. Consultation for Anemia- on anticoagulation. Pt is known to Dr. Tessie Lindsey, last seen in office on 9/2021 for follow-up colonoscopy. Currently, pt reports he feels fine. Currently NPO. Bowel movement yesterday described as brown and formed. Denies melena or hematochezia. Denies abdominal pain, nausea or vomiting.  Labs today hemoglobin 7.8; hematocrit 28.9; MCV 64.4; MCH 17.4; MCHC 27.    Past Medical History:        Diagnosis Date    Hyperlipidemia     Hypertension      Past Surgical History:        Procedure Laterality Date    CARDIOVERSION  05/30/2018    EYE SURGERY Left 05/2018    cataracts, retinal tear repair.  HERNIA REPAIR      KIDNEY REMOVAL Right     Half of kidney removed.  KIDNEY SURGERY      LITHOTRIPSY      [place] FRAGMENT KIDNEY STONE/ ESWL Right 10/25/2018    ESWL EXTRACORPEAL SHOCK WAVE LITHOTRIPSY WITH CYSTO AND RIGHT STENT PLACEMENT performed by Brandi Zhang MD at [place] [address] TRANSESOPHAGEAL ECHOCARDIOGRAM  2018     Current Medications:    Current Facility-Administered Medications: ramipril (ALTACE) capsule 10 mg, 10 mg, Oral, BID  sodium chloride flush 0.9 % injection 5-40 mL, 5-40 mL, IntraVENous, 2 times per day  sodium chloride flush 0.9 % injection 5-40 mL, 5-40 mL, IntraVENous, PRN  0.9 % sodium chloride infusion, 25 mL, IntraVENous, PRN  ondansetron (ZOFRAN-ODT) disintegrating tablet 4 mg, 4 mg, Oral, Q8H PRN **OR** ondansetron (ZOFRAN) injection 4 mg, 4 mg, IntraVENous, Q6H PRN  polyethylene glycol (GLYCOLAX) packet 17 g, 17 g, Oral, Daily PRN  acetaminophen (TYLENOL) tablet 650 mg, 650 mg, Oral, Q6H PRN **OR** acetaminophen (TYLENOL) suppository 650 mg, 650 mg, Rectal, Q6H PRN  pantoprazole (PROTONIX) injection 40 mg, 40 mg, IntraVENous, BID **AND** sodium chloride (PF) 0.9 % injection 10 mL, 10 mL, IntraVENous, BID  perflutren lipid microspheres (DEFINITY) injection 1.65 mg, 1.5 mL, IntraVENous, ONCE PRN  metoprolol tartrate (LOPRESSOR) tablet 50 mg, 50 mg, Oral, BID  ferrous sulfate (IRON 325) tablet 325 mg, 325 mg, Oral, Daily with breakfast  polyethylene glycol (GLYCOLAX) packet 17 g, 17 g, Oral, Daily    Allergies:  Pcn [penicillins]    Social History:    Tobacco:  Pt denies  Alcohol:  Pt denies  Illicit Drugs: Pt denies    Family History: Mother: ; breast cancer  Father: ; multiple myeloma  Brother: Alive; DM 1, kidney transplant, heart disease  Children: Alive; healthy    REVIEW OF SYSTEMS:    Aside from what was mentioned in the PMH and HPI, essentially unremarkable, all others negative.     PHYSICAL EXAM:      Vitals:    BP (!) 175/87   Pulse 78 Temp 98.1 °F (36.7 °C) (Oral)   Resp 18   Ht 5' 6\" (1.676 m)   Wt 200 lb (90.7 kg)   SpO2 97%   BMI 32.28 kg/m²       CONSTITUTIONAL:  awake, alert, cooperative, no apparent distress, and appears stated age  EYES:  pupils equal, round and reactive to light, sclera anicteric and conjunctiva normal  ENT:  normocephalic, oral pharynx with moist mucous membranes  NECK:  supple   HEMATOLOGIC/LYMPHATICS:  no cervical lymphadenopathy and no supraclavicular lymphadenopathy  LUNGS:  No increased work of breathing, good air exchange, clear to auscultation bilaterally.   CARDIOVASCULAR:  Normal apical impulse, regular rate and rhythm, no murmur noted; 2+ pulses; no edema  ABDOMEN:  normal bowel sounds, soft, non-distended, non-tender, no masses palpated, no hepatosplenomegally  MUSCULOSKELETAL:  full range of motion noted  motor strength is 5 out of 5 all extremities bilaterally  NEUROLOGIC:  Mental Status Exam:  Level of Alertness:   awake  Orientation:   person, place, time  Motor Exam:  Motor exam is symmetrical 5 out of 5 all extremities bilaterally  SKIN:  normal skin color, texture, turgor    DATA:    CBC with Differential:    Lab Results   Component Value Date    WBC 5.6 02/24/2022    RBC 4.49 02/24/2022    HGB 7.8 02/24/2022    HCT 28.9 02/24/2022     02/24/2022    MCV 64.4 02/24/2022    MCH 17.4 02/24/2022    MCHC 27.0 02/24/2022    RDW 19.7 02/24/2022    NRBC 0.0 02/23/2022    METASPCT 1.8 02/23/2022    LYMPHOPCT 20.6 02/23/2022    MONOPCT 8.6 02/23/2022    BASOPCT 0.5 02/23/2022    MONOSABS 0.66 02/23/2022    LYMPHSABS 1.58 02/23/2022    EOSABS 0.04 02/23/2022    BASOSABS 0.04 02/23/2022     CMP:    Lab Results   Component Value Date     02/23/2022    K 4.0 02/23/2022     02/23/2022    CO2 24 02/23/2022    BUN 21 02/23/2022    CREATININE 1.0 02/23/2022    GFRAA >60 02/23/2022    LABGLOM >60 02/23/2022    GLUCOSE 100 02/23/2022    PROT 6.5 02/23/2022    LABALBU 4.1 02/23/2022    CALCIUM 8.6 02/23/2022    BILITOT 1.4 02/23/2022    ALKPHOS 49 02/23/2022    AST 14 02/23/2022    ALT 12 02/23/2022     Hepatic Function Panel:    Lab Results   Component Value Date    ALKPHOS 49 02/23/2022    ALT 12 02/23/2022    AST 14 02/23/2022    PROT 6.5 02/23/2022    BILITOT 1.4 02/23/2022    LABALBU 4.1 02/23/2022     PT/INR:    Lab Results   Component Value Date    PROTIME 13.6 02/24/2022    INR 1.3 02/24/2022     PTT:    Lab Results   Component Value Date    APTT 34.7 12/27/2020   [APTT}  Last 3 Troponin:    Lab Results   Component Value Date    TROPONINI <0.01 12/27/2020    TROPONINI <0.01 05/14/2018     TSH:    Lab Results   Component Value Date    TSH 1.190 02/22/2022       No components found for: CHLPL  Lab Results   Component Value Date    TRIG 59 02/22/2022    TRIG 112 02/01/2021    TRIG 77 10/15/2019     Lab Results   Component Value Date    HDL 52 02/22/2022    HDL 64 02/01/2021    HDL 50 10/15/2019     Lab Results   Component Value Date    LDLCALC 59 02/22/2022    LDLCALC 64 02/01/2021    LDLCALC 82 10/15/2019     Lab Results   Component Value Date    LABVLDL 12 02/22/2022    LABVLDL 22 02/01/2021    LABVLDL 15 10/15/2019        CTA CHEST W CONTRAST    Result Date: 2/23/2022  EXAMINATION: CTA OF THE CHEST 2/23/2022 11:03 am TECHNIQUE: CTA of the chest was performed after the administration of intravenous contrast.  Multiplanar reformatted images are provided for review. MIP images are provided for review. Dose modulation, iterative reconstruction, and/or weight based adjustment of the mA/kV was utilized to reduce the radiation dose to as low as reasonably achievable.  COMPARISON: 08/09/2021 and 07/30/2020 HISTORY: ORDERING SYSTEM PROVIDED HISTORY: hx of aneurysm TECHNOLOGIST PROVIDED HISTORY: Reason for exam:->hx of aneurysm Decision Support Exception - unselect if not a suspected or confirmed emergency medical condition->Emergency Medical Condition (MA) FINDINGS: Pulmonary Arteries: No gross pulmonary embolus is noted. The pulmonary trunk is normal caliber. Mediastinum: No evidence of mediastinal lymphadenopathy. The heart is not enlarged. There is no pericardial effusion. There is moderate calcified plaque seen within the coronary arteries. There is stable aneurysmal dilatation of the ascending thoracic aorta measuring 4.2 x 4.1 cm in maximum AP and transverse diameter. The aortic arch is normal caliber. The descending thoracic aorta is normal caliber. Lungs/pleura: The lungs are without acute process. No focal consolidation or pulmonary edema. No evidence of pleural effusion or pneumothorax. Upper Abdomen: The abdomen will did dictated separately. Soft Tissues/Bones: No acute bone or soft tissue abnormality. 1. Stable aneurysmal dilatation of the ascending thoracic aorta measuring 4.2 x 4.1 cm. This finding is unchanged compared to prior study of 08/09/2021 and 07/30/2020. Yearly follow-up CT scan is recommended to confirm stability. 2. There is no aneurysm of the aortic arch or descending thoracic aorta. 3. The lungs are clear. RECOMMENDATIONS: Yearly follow-up CT scan is recommended     CTA ABDOMEN PELVIS W CONTRAST    Result Date: 2/23/2022  EXAMINATION: CTA OF THE ABDOMEN AND PELVIS WITH CONTRAST 2/23/2022 11:03 am: TECHNIQUE: CTA of the abdomen and pelvis was performed with the administration of intravenous contrast. Multiplanar reformatted images are provided for review. MIP images are provided for review. Dose modulation, iterative reconstruction, and/or weight based adjustment of the mA/kV was utilized to reduce the radiation dose to as low as reasonably achievable. COMPARISON: None. HISTORY: ORDERING SYSTEM PROVIDED HISTORY: hx of aneurysm TECHNOLOGIST PROVIDED HISTORY: Reason for exam:->hx of aneurysm Decision Support Exception - unselect if not a suspected or confirmed emergency medical condition->Emergency Medical Condition (MA) FINDINGS: There is mild fatty infiltration of the liver. The spleen, pancreas, adrenal glands and kidneys are unremarkable. There is no obstructive uropathy. There are linear dense foci seen just superior to the right renal vein of uncertain etiology. These could represent clips or calcified plaque. There are several small stones seen within the gallbladder lumen. There is no evidence of acute cholecystitis. The stomach is normally distended. There is no large or small bowel obstruction. Scattered diverticula noted. The appendix is visualized and is unremarkable. No evidence of acute appendicitis. Bladder is unremarkable in appearance. Bilateral inguinal hernias are noted containing mesenteric fat only. Age related degenerative changes of the visualized osseous structures without focal destructive lesion. CTA ABDOMEN: The abdominal aorta is normal caliber. There is no aneurysm or dissection. Minimal peripheral calcified plaque is seen within the infrarenal abdominal aorta. The celiac artery, superior mesenteric artery, renal arteries and inferior mesenteric artery are widely patent. The aortic bifurcation is patent. CTA PELVIS: The right and left common and external iliac arteries are widely patent. The common femoral arteries are widely patent. 1. There is no abdominal aortic aneurysm or dissection 2. Cholelithiasis. There is no evidence of acute cholecystitis. 3. Colonic diverticulosis. There are no findings of diverticulitis. IMPRESSION:  · Anemia, macrocytic - on Eliquis; no gross signs of GI bleed, occult stool negative  · A. Fib-Eliquis on hold-cardiology following  · Thoracic aneurysm-cardiology following    RECOMMENDATIONS:    · EGD tomorrow with Dr. Everett Olvera Procedure details for EGD discussed in detail. Complications including but not limited to, perforation, bleeding and infection were discussed in great detail. Risks, benefits, and alternatives explained. Pt has understood the information and has agreed to proceed.    · Diet as tolerated  · NPO after midnight  · Monitor hemoglobin, transfuse per PCP  · Continue Protonix as ordered  · Monitor CBC and CMP daily  · Supportive care  · Continue to monitor    Note: This report was completed utilizing computer voice recognition software. Every effort has been made to ensure accuracy, however; inadvertent computerized transcription errors may be present. Thank you very much for your consultation. We will follow closely with you.     Discussed with Dr. Mini Mendoza developed by Dr. Cheko Pandya, SO, NP-C 2/24/2022 9:49 AM for Dr. Camelia Myers

## 2022-02-24 NOTE — PLAN OF CARE
Problem: Bleeding:  Goal: Will show no signs and symptoms of excessive bleeding  Outcome: Met This Shift     Problem: Pain:  Goal: Pain level will decrease  Outcome: Met This Shift

## 2022-02-24 NOTE — CARE COORDINATION
Social work / Discharge planning:       Social work met with patient for initial assessment. He lives with his wife and uses no DME. Patient states he is independent and drives. No history of HC or MALU. Per IDR, plan is for echo and egd today. Social work will follow.    Electronically signed by BIBI Restrepo on 2/24/2022 at 8:36 AM

## 2022-02-24 NOTE — PROGRESS NOTES
ferrous sulfate (IRON 325) tablet 325 mg  325 mg Oral Daily with breakfast Jemal Browning MD        polyethylene glycol [facility]) packet 17 g  17 g Oral Daily Jemal Browning MD           Review of systems:   Heart: as above   Lungs: as above   Eyes: denies changes in vision or discharge. Ears: denies changes in hearing or pain. Nose: denies epistaxis or masses   Throat: denies sore throat or trouble swallowing. Neuro: denies numbness, tingling, tremors. Skin: denies rashes or itching. : denies hematuria, dysuria   GI: denies vomiting, diarrhea   Psych: denies mood changed, anxiety, depression. Physical Exam   BP (!) 175/87   Pulse 78   Temp 98.1 °F (36.7 °C) (Oral)   Resp 18   Ht 5' 6\" (1.676 m)   Wt 200 lb (90.7 kg)   SpO2 97%   BMI 32.28 kg/m²   Constitutional: Oriented to person, place, and time. No distress. Well developed. Head: Normocephalic and atraumatic. Neck: Neck supple. No hepatojugular reflux. No JVD present. Carotid bruit is not present. No tracheal deviation present. No thyromegaly present. Cardiovascular: Normal rate, regular rhythm, normal heart sounds. intact distal pulses. No gallop and no friction rub. No murmur heard. Pulmonary: Breath sounds normal. No respiratory distress. No wheezes. No rales. Chest: Effort normal. No tenderness. Abdominal: Soft. Bowel sounds are normal. No distension or mass. No tenderness, rebound or guarding. Musculoskeletal: . No tenderness. No clubbing or cyanosis. Extremitites: Intact distal pulses. No edema  Neurological: Alert and oriented to person, place, and time. Skin: Skin is warm and dry. No rash noted. Not diaphoretic. No erythema. Psychiatric: Normal mood and affect. Behavior is normal.   Lymphadenopathy: No cervical adenopathy. No groin adenopathy.     CBC:   Lab Results   Component Value Date    WBC 7.7 02/23/2022    RBC 4.46 02/23/2022    HGB 7.8 02/23/2022    HCT 28.7 02/23/2022    MCV 64.3 02/23/2022    MCH 17.5 02/23/2022    MCHC 27.2 02/23/2022    RDW 19.8 02/23/2022     02/23/2022    MPV NOT CALC 02/23/2022     BMP:   Lab Results   Component Value Date     02/23/2022    K 4.0 02/23/2022     02/23/2022    CO2 24 02/23/2022    BUN 21 02/23/2022    LABALBU 4.1 02/23/2022    CREATININE 1.0 02/23/2022    CALCIUM 8.6 02/23/2022    GFRAA >60 02/23/2022    LABGLOM >60 02/23/2022     Magnesium:    Lab Results   Component Value Date    MG 1.9 02/23/2022     Cardiac Enzymes:   Lab Results   Component Value Date    TROPHS 14 (H) 02/23/2022      PT/INR:    Lab Results   Component Value Date    PROTIME 14.4 12/27/2020    INR 1.2 12/27/2020     TSH:    Lab Results   Component Value Date    TSH 1.190 02/22/2022     Rhythm Strip: normal sinus rhythm. ASSESSMENT & PLAN:    Patient Active Problem List   Diagnosis    Hypertension    Hyperlipidemia    Paroxysmal atrial fibrillation (HCC)    Valvular heart disease    Thoracic ascending aortic aneurysm (HCC)    Anemia     1. NSVT:    Titrate BB. Mag. Maintain K.    Echo. Stress when anemia stable. 2. JUAREZ/Chest symptoms    Echo. Stress when anemia stable. 3. PAFlutter: In sinus. BB. Typically on elquis, held due to anemia. Monitor. 4. VHD:Mild AI and MR on prior echo. Echo. 5. Anemia: Per GI and primary service. 6. HTN: Observe. Titrate medications as needed. 7. Lipids: Statin. 8. TAA: 42 mm. Observe. Estiven Gordillo D.O.   Cardiologist  Cardiology, [facility]

## 2022-02-25 ENCOUNTER — ANESTHESIA (OUTPATIENT)
Dept: ENDOSCOPY | Age: 72
End: 2022-02-25
Payer: MEDICARE

## 2022-02-25 VITALS
HEIGHT: 66 IN | DIASTOLIC BLOOD PRESSURE: 106 MMHG | HEART RATE: 71 BPM | BODY MASS INDEX: 32.77 KG/M2 | WEIGHT: 203.93 LBS | RESPIRATION RATE: 18 BRPM | SYSTOLIC BLOOD PRESSURE: 145 MMHG | TEMPERATURE: 98.1 F | OXYGEN SATURATION: 99 %

## 2022-02-25 VITALS
OXYGEN SATURATION: 96 % | RESPIRATION RATE: 39 BRPM | DIASTOLIC BLOOD PRESSURE: 89 MMHG | SYSTOLIC BLOOD PRESSURE: 171 MMHG

## 2022-02-25 LAB
ANION GAP SERPL CALCULATED.3IONS-SCNC: 11 MMOL/L (ref 7–16)
BUN BLDV-MCNC: 17 MG/DL (ref 6–23)
CALCIUM SERPL-MCNC: 8.9 MG/DL (ref 8.6–10.2)
CHLORIDE BLD-SCNC: 100 MMOL/L (ref 98–107)
CO2: 24 MMOL/L (ref 22–29)
CREAT SERPL-MCNC: 1.2 MG/DL (ref 0.7–1.2)
GFR AFRICAN AMERICAN: >60
GFR NON-AFRICAN AMERICAN: 59 ML/MIN/1.73
GLUCOSE BLD-MCNC: 103 MG/DL (ref 74–99)
HCT VFR BLD CALC: 30 % (ref 37–54)
HEMOGLOBIN: 8.1 G/DL (ref 12.5–16.5)
LV EF: 50 %
LVEF MODALITY: NORMAL
POTASSIUM SERPL-SCNC: 3.9 MMOL/L (ref 3.5–5)
SODIUM BLD-SCNC: 135 MMOL/L (ref 132–146)

## 2022-02-25 PROCEDURE — 6360000002 HC RX W HCPCS

## 2022-02-25 PROCEDURE — 6360000002 HC RX W HCPCS: Performed by: INTERNAL MEDICINE

## 2022-02-25 PROCEDURE — G0378 HOSPITAL OBSERVATION PER HR: HCPCS

## 2022-02-25 PROCEDURE — 93306 TTE W/DOPPLER COMPLETE: CPT

## 2022-02-25 PROCEDURE — 85014 HEMATOCRIT: CPT

## 2022-02-25 PROCEDURE — 2580000003 HC RX 258: Performed by: NURSE PRACTITIONER

## 2022-02-25 PROCEDURE — C9113 INJ PANTOPRAZOLE SODIUM, VIA: HCPCS | Performed by: INTERNAL MEDICINE

## 2022-02-25 PROCEDURE — 36415 COLL VENOUS BLD VENIPUNCTURE: CPT

## 2022-02-25 PROCEDURE — 6370000000 HC RX 637 (ALT 250 FOR IP): Performed by: INTERNAL MEDICINE

## 2022-02-25 PROCEDURE — 80048 BASIC METABOLIC PNL TOTAL CA: CPT

## 2022-02-25 PROCEDURE — APPSS45 APP SPLIT SHARED TIME 31-45 MINUTES: Performed by: NURSE PRACTITIONER

## 2022-02-25 PROCEDURE — 99239 HOSP IP/OBS DSCHRG MGMT >30: CPT | Performed by: INTERNAL MEDICINE

## 2022-02-25 PROCEDURE — 96376 TX/PRO/DX INJ SAME DRUG ADON: CPT

## 2022-02-25 PROCEDURE — 2580000003 HC RX 258

## 2022-02-25 PROCEDURE — 7100000011 HC PHASE II RECOVERY - ADDTL 15 MIN: Performed by: INTERNAL MEDICINE

## 2022-02-25 PROCEDURE — 3609012400 HC EGD TRANSORAL BIOPSY SINGLE/MULTIPLE: Performed by: INTERNAL MEDICINE

## 2022-02-25 PROCEDURE — 85018 HEMOGLOBIN: CPT

## 2022-02-25 PROCEDURE — 3700000000 HC ANESTHESIA ATTENDED CARE: Performed by: INTERNAL MEDICINE

## 2022-02-25 PROCEDURE — 99233 SBSQ HOSP IP/OBS HIGH 50: CPT | Performed by: INTERNAL MEDICINE

## 2022-02-25 PROCEDURE — 3700000001 HC ADD 15 MINUTES (ANESTHESIA): Performed by: INTERNAL MEDICINE

## 2022-02-25 PROCEDURE — 88305 TISSUE EXAM BY PATHOLOGIST: CPT

## 2022-02-25 PROCEDURE — 7100000010 HC PHASE II RECOVERY - FIRST 15 MIN: Performed by: INTERNAL MEDICINE

## 2022-02-25 PROCEDURE — 2580000003 HC RX 258: Performed by: INTERNAL MEDICINE

## 2022-02-25 PROCEDURE — A4216 STERILE WATER/SALINE, 10 ML: HCPCS | Performed by: INTERNAL MEDICINE

## 2022-02-25 PROCEDURE — 2709999900 HC NON-CHARGEABLE SUPPLY: Performed by: INTERNAL MEDICINE

## 2022-02-25 RX ORDER — SODIUM CHLORIDE 9 MG/ML
INJECTION, SOLUTION INTRAVENOUS CONTINUOUS PRN
Status: DISCONTINUED | OUTPATIENT
Start: 2022-02-25 | End: 2022-02-25 | Stop reason: SDUPTHER

## 2022-02-25 RX ORDER — PANTOPRAZOLE SODIUM 40 MG/1
40 TABLET, DELAYED RELEASE ORAL
Qty: 30 TABLET | Refills: 3 | Status: SHIPPED | OUTPATIENT
Start: 2022-02-25 | End: 2022-02-25 | Stop reason: HOSPADM

## 2022-02-25 RX ORDER — FERROUS SULFATE 325(65) MG
325 TABLET ORAL
Qty: 30 TABLET | Refills: 3 | Status: SHIPPED | OUTPATIENT
Start: 2022-02-26 | End: 2022-09-30

## 2022-02-25 RX ORDER — PANTOPRAZOLE SODIUM 40 MG/1
40 TABLET, DELAYED RELEASE ORAL
Qty: 180 TABLET | Refills: 1 | Status: SHIPPED | OUTPATIENT
Start: 2022-02-25 | End: 2022-03-25 | Stop reason: SDUPTHER

## 2022-02-25 RX ORDER — PANTOPRAZOLE SODIUM 40 MG/1
40 TABLET, DELAYED RELEASE ORAL
Status: DISCONTINUED | OUTPATIENT
Start: 2022-02-25 | End: 2022-02-25 | Stop reason: HOSPADM

## 2022-02-25 RX ORDER — PROPOFOL 10 MG/ML
INJECTION, EMULSION INTRAVENOUS PRN
Status: DISCONTINUED | OUTPATIENT
Start: 2022-02-25 | End: 2022-02-25 | Stop reason: SDUPTHER

## 2022-02-25 RX ORDER — METOPROLOL TARTRATE 50 MG/1
50 TABLET, FILM COATED ORAL 2 TIMES DAILY
Qty: 60 TABLET | Refills: 3 | Status: SHIPPED | OUTPATIENT
Start: 2022-02-25 | End: 2022-03-25 | Stop reason: SDUPTHER

## 2022-02-25 RX ADMIN — SODIUM CHLORIDE, PRESERVATIVE FREE 10 ML: 5 INJECTION INTRAVENOUS at 09:39

## 2022-02-25 RX ADMIN — ACETAMINOPHEN 650 MG: 325 TABLET ORAL at 09:31

## 2022-02-25 RX ADMIN — METOPROLOL TARTRATE 50 MG: 50 TABLET, FILM COATED ORAL at 09:20

## 2022-02-25 RX ADMIN — PANTOPRAZOLE SODIUM 40 MG: 40 TABLET, DELAYED RELEASE ORAL at 16:38

## 2022-02-25 RX ADMIN — RAMIPRIL 10 MG: 5 CAPSULE ORAL at 16:38

## 2022-02-25 RX ADMIN — FERROUS SULFATE TAB 325 MG (65 MG ELEMENTAL FE) 325 MG: 325 (65 FE) TAB at 09:20

## 2022-02-25 RX ADMIN — PANTOPRAZOLE SODIUM 40 MG: 40 INJECTION, POWDER, FOR SOLUTION INTRAVENOUS at 09:37

## 2022-02-25 RX ADMIN — SODIUM CHLORIDE: 9 INJECTION, SOLUTION INTRAVENOUS at 14:07

## 2022-02-25 RX ADMIN — SODIUM CHLORIDE 500 ML: 9 INJECTION, SOLUTION INTRAVENOUS at 09:39

## 2022-02-25 RX ADMIN — SODIUM CHLORIDE 500 ML: 9 INJECTION, SOLUTION INTRAVENOUS at 02:38

## 2022-02-25 RX ADMIN — PROPOFOL 150 MG: 10 INJECTION, EMULSION INTRAVENOUS at 14:24

## 2022-02-25 RX ADMIN — SODIUM CHLORIDE, PRESERVATIVE FREE 10 ML: 5 INJECTION INTRAVENOUS at 09:40

## 2022-02-25 ASSESSMENT — LIFESTYLE VARIABLES: SMOKING_STATUS: 0

## 2022-02-25 ASSESSMENT — PAIN SCALES - GENERAL
PAINLEVEL_OUTOF10: 0
PAINLEVEL_OUTOF10: 1

## 2022-02-25 NOTE — PROGRESS NOTES
[facility] Hospitalist   Progress Note    Admitting Date and Time: 2/23/2022  8:24 AM  Admit Dx: Anemia [D64.9]  Chronic anticoagulation [Z79.01]  Thoracic aortic aneurysm without rupture (HCC) [I71.2]  Anemia, unspecified type [D64.9]    Subjective:    Pt lying in bed in no acute distress. Patient states he had some difficulty drawing a.m. labs. He denies any new concerns at this time. Hgb 8.3 yesterday. Repeat H/H pending. Patient for EGD 2:30 PM.  Hoping to DC following procedure. Denies any new concerns at this time. Per RN: No new concerns at this time. EGD 2:30 PM    ROS: denies fever, chills, cp, sob, n/v, HA unless stated above.      ramipril  10 mg Oral BID    sodium chloride flush  5-40 mL IntraVENous 2 times per day    pantoprazole  40 mg IntraVENous BID    And    sodium chloride (PF)  10 mL IntraVENous BID    metoprolol tartrate  50 mg Oral BID    ferrous sulfate  325 mg Oral Daily with breakfast    polyethylene glycol  17 g Oral Daily     sodium chloride flush, 5-40 mL, PRN  sodium chloride, 25 mL, PRN  ondansetron, 4 mg, Q8H PRN   Or  ondansetron, 4 mg, Q6H PRN  polyethylene glycol, 17 g, Daily PRN  acetaminophen, 650 mg, Q6H PRN   Or  acetaminophen, 650 mg, Q6H PRN  perflutren lipid microspheres, 1.5 mL, ONCE PRN         Objective:    BP (!) 152/76   Pulse 73   Temp 96.2 °F (35.7 °C) (Oral)   Resp 15   Ht 5' 6\" (1.676 m)   Wt 203 lb 14.8 oz (92.5 kg)   SpO2 96%   BMI 32.91 kg/m²   General Appearance: alert and oriented to person, place and time and in no acute distress  Skin: warm and dry  Head: normocephalic and atraumatic  Eyes: pupils equal, round, and reactive to light, extraocular eye movements intact, conjunctivae normal  Neck: neck supple and non tender without mass   Pulmonary/Chest: clear to auscultation bilaterally- no wheezes, rales or rhonchi, normal air movement, no respiratory distress  Cardiovascular: normal rate, normal S1 and S2 and no RGM  Abdomen: soft, non-tender, non-distended, normal bowel sounds, no masses or organomegaly  Extremities: no cyanosis, no clubbing and no edema  Neurologic: no cranial nerve deficit and speech normal      Recent Labs     02/23/22  0907 02/24/22  0908 02/25/22  0420    138 135   K 4.0 4.2 3.9    104 100   CO2 24 25 24   BUN 21 16 17   CREATININE 1.0 1.0 1.2   GLUCOSE 100* 94 103*   CALCIUM 8.6 9.1 8.9       Recent Labs     02/22/22  1344 02/23/22  0907   ALKPHOS 51 49   PROT 7.4 6.5   LABALBU 4.3 4.1   BILITOT 1.7* 1.4*   AST 17 14   ALT 13 12       Recent Labs     02/23/22  1734 02/24/22  0908 02/24/22  1314   WBC 7.7 5.6 6.2   RBC 4.46 4.49 4.72   HGB 7.8* 7.8* 8.3*   HCT 28.7* 28.9* 30.5*   MCV 64.3* 64.4* 64.6*   MCH 17.5* 17.4* 17.6*   MCHC 27.2* 27.0* 27.2*   RDW 19.8* 19.7* 19.9*    166 212   MPV NOT CALC NOT CALC NOT CALC           Radiology:   CTA CHEST W CONTRAST   Final Result   1. Stable aneurysmal dilatation of the ascending thoracic aorta measuring 4.2   x 4.1 cm. This finding is unchanged compared to prior study of 08/09/2021   and 07/30/2020. Yearly follow-up CT scan is recommended to confirm stability. 2. There is no aneurysm of the aortic arch or descending thoracic aorta. 3. The lungs are clear. RECOMMENDATIONS:   Yearly follow-up CT scan is recommended         CTA ABDOMEN PELVIS W CONTRAST   Final Result   1. There is no abdominal aortic aneurysm or dissection   2. Cholelithiasis. There is no evidence of acute cholecystitis. 3. Colonic diverticulosis. There are no findings of diverticulitis. Assessment:  Principal Problem:    Anemia  Resolved Problems:    * No resolved hospital problems. *      Plan:  1. Symptomatic Acute Microcytic Anemia-PCP due to abnormal lab work drawn yesterday- showing anemia with hg 7.7. He is on chronic anticoagulation- eliquis for h/o Atrial fibrillation. Hx recent normal Colonoscopy. Hgb 7.6 upon presentation.  CTA Abdomen Diverticulosis. CTA Chest stable thoracic aneurysm 4.2X4.1cm. Hold Eliquis. NPO. Protonix BID. Occult stool. Iron panel. No overt signs of bleeding noted. Follow transfuse as needed. EGD today GI input appreciated. 2. Atrial Fibrillation- HR controlled. EKG with SR. Holding Eliquis 2/2 possible GI bleed. SCDs. Telemetry monitoring. Continue Metoprolol. 3.  NSVT- 2 runs of NSVT. Mg+ 1.9. K+4.0 Continue BB/titrating per cardiology. Telemetry monitoring. ECHO/Stress. Cardiology input appreciated. 4. HTN- Continue Metoprolol/ramipril. Follow adjust as needed  5. Thoracic Aneurysm- CTA 4.2x4.1cm Stable since last CT. Will need continue follow up. 6. Dispositionfor EGD this afternoon. Possible DC today/tomorrow. Pending results. H/H pending. NOTE: This report was transcribed using voice recognition software. Every effort was made to ensure accuracy; however, inadvertent computerized transcription errors may be present. Electronically signed by Vito Bill CNP on 2/25/2022 at 8:40 AM               gretchen [address].

## 2022-02-25 NOTE — ANESTHESIA PRE PROCEDURE
Department of Anesthesiology  Preprocedure Note       Name:  Edna Camejo   Age:  74 y.o.  :  1950                                          MRN:  [id number]         Date:  2022      Surgeon: Lisa Bardales):  Salome Matt MD    Procedure: Procedure(s):  EGD ESOPHAGOGASTRODUODENOSCOPY    Medications prior to admission:   Prior to Admission medications    Medication Sig Start Date End Date Taking?  Authorizing Provider   metoprolol tartrate (LOPRESSOR) 50 MG tablet Take 1 tablet by mouth 2 times daily 22  Yes Danielle Payton DO   ferrous sulfate (IRON 325) 325 (65 Fe) MG tablet Take 1 tablet by mouth daily (with breakfast) 22  Yes Danielle Payton DO   pantoprazole (PROTONIX) 40 MG tablet Take 1 tablet by mouth 2 times daily (before meals) 22  Yes Danielle Payton DO   acetaminophen (TYLENOL 8 HOUR ARTHRITIS PAIN) 650 MG extended release tablet Take 650 mg by mouth 2 times daily as needed for Pain   Yes Historical Provider, MD   atorvastatin (LIPITOR) 40 MG tablet Take 40 mg by mouth nightly   Yes Historical Provider, MD   apixaban (ELIQUIS) 5 MG TABS tablet Take 5 mg by mouth 2 times daily   Yes Historical Provider, MD   ramipril (ALTACE) 10 MG capsule Take 10 mg by mouth 2 times daily   Yes Historical Provider, MD   cetirizine (ZYRTEC) 5 MG tablet Take 5 mg by mouth nightly    Yes Historical Provider, MD       Current medications:    Current Facility-Administered Medications   Medication Dose Route Frequency Provider Last Rate Last Admin    0.9 % sodium chloride infusion  500 mL IntraVENous Continuous SO Mota - CNP 50 mL/hr at 22 0939 500 mL at 22 0939    ramipril (ALTACE) capsule 10 mg  10 mg Oral BID Jonel Richey MD   10 mg at 22    sodium chloride flush 0.9 % injection 5-40 mL  5-40 mL IntraVENous 2 times per day Jonel Richey MD   10 mL at 22 0940    sodium chloride flush 0.9 % injection 5-40 mL  5-40 mL IntraVENous PRN Jonel Richey MD   10 mL at 02/24/22 0909    0.9 % sodium chloride infusion  25 mL IntraVENous PRN Mei Harper  mL/hr at 02/23/22 1518 25 mL at 02/23/22 1518    ondansetron (ZOFRAN-ODT) disintegrating tablet 4 mg  4 mg Oral Q8H PRN Mei Harper MD        Or    [facility]dansetron [facility]) injection 4 mg  4 mg IntraVENous Q6H PRN Mei Harper MD        polyethylene glycol [facility]) packet 17 g  17 g Oral Daily PRN Mei Harper MD        acetaminophen (TYLENOL) tablet 650 mg  650 mg Oral Q6H PRN Mei Harper MD   650 mg at 02/25/22 4653    Or    acetaminophen (TYLENOL) suppository 650 mg  650 mg Rectal Q6H PRN Mei Harper MD        pantoprazole (PROTONIX) injection 40 mg  40 mg IntraVENous BID Mei Harper MD   40 mg at 02/25/22 3978    And    sodium chloride (PF) 0.9 % injection 10 mL  10 mL IntraVENous BID Mei Harper MD   10 mL at 02/25/22 5549    perflutren lipid microspheres (DEFINITY) injection 1.65 mg  1.5 mL IntraVENous ONCE PRN Estefania Hemp, DO        metoprolol tartrate (LOPRESSOR) tablet 50 mg  50 mg Oral BID Estefania Hemp, DO   50 mg at 02/25/22 0920    ferrous sulfate (IRON 325) tablet 325 mg  325 mg Oral Daily with breakfast Mei Harper MD   325 mg at 02/25/22 0920    polyethylene glycol (GLYCOLAX) packet 17 g  17 g Oral Daily Mei Harper MD         Facility-Administered Medications Ordered in Other Encounters   Medication Dose Route Frequency Provider Last Rate Last Admin    0.9 % sodium chloride infusion   IntraVENous Continuous PRN SO Albarran - CRNA   New Bag at 02/25/22 1407       Allergies:     Allergies   Allergen Reactions    Pcn [Penicillins] Itching, Swelling and Rash       Problem List:    Patient Active Problem List   Diagnosis Code    Hypertension I10    Hyperlipidemia E78.5    Paroxysmal atrial fibrillation (HCC) I48.0    Valvular heart disease I38    Thoracic ascending aortic aneurysm (HCC) I71.2    Anemia D64.9       Past Medical History:        Diagnosis Date    Hyperlipidemia     Hypertension        Past Surgical History:        Procedure Laterality Date    CARDIOVERSION  2018    EYE SURGERY Left 2018    cataracts, retinal tear repair.  HERNIA REPAIR      KIDNEY REMOVAL Right     Half of kidney removed.  KIDNEY SURGERY      LITHOTRIPSY      NH FRAGMENT KIDNEY STONE/ ESWL Right 10/25/2018    ESWL EXTRACORPEAL SHOCK WAVE LITHOTRIPSY WITH CYSTO AND RIGHT STENT PLACEMENT performed by Brandi Zhang MD at [facility] 22 TRANSESOPHAGEAL ECHOCARDIOGRAM  2018       Social History:    Social History     Tobacco Use    Smoking status: Former Smoker     Packs/day: 0.25     Years: 0.50     Pack years: 0.12     Quit date: 1978     Years since quittin.7    Smokeless tobacco: Never Used   Substance Use Topics    Alcohol use: Yes     Types: 3 Glasses of wine per week     Comment: occasionally                                Counseling given: Not Answered      Vital Signs (Current):   Vitals:    22 1313 22 2045 22 0043 22 0730   BP: (!) 167/89 (!) 171/77  (!) 152/76   Pulse: 76 70  73   Resp:  18  15   Temp:  98 °F (36.7 °C)  96.2 °F (35.7 °C)   TempSrc:  Oral  Oral   SpO2: 100% 99%  96%   Weight:   203 lb 14.8 oz (92.5 kg)    Height:                                                  BP Readings from Last 3 Encounters:   22 (!) 152/76   22 122/76   02/15/22 (!) 178/86       NPO Status:                                                                                 BMI:   Wt Readings from Last 3 Encounters:   22 203 lb 14.8 oz (92.5 kg)   22 211 lb (95.7 kg)   02/15/22 207 lb (93.9 kg)     Body mass index is 32.91 kg/m².     CBC:   Lab Results   Component Value Date    WBC 6.2 2022    RBC 4.72 2022    HGB 8.1 2022    HCT 30.0 2022    MCV 64.6 2022    RDW 19.9 2022     2022       CMP:   Lab Results   Component Value Date     2022    K 3.9 02/25/2022    K 4.0 02/23/2022     02/25/2022    CO2 24 02/25/2022    BUN 17 02/25/2022    CREATININE 1.2 02/25/2022    GFRAA >60 02/25/2022    LABGLOM 59 02/25/2022    GLUCOSE 103 02/25/2022    PROT 6.5 02/23/2022    CALCIUM 8.9 02/25/2022    BILITOT 1.4 02/23/2022    ALKPHOS 49 02/23/2022    AST 14 02/23/2022    ALT 12 02/23/2022       POC Tests: No results for input(s): POCGLU, POCNA, POCK, POCCL, POCBUN, POCHEMO, POCHCT in the last 72 hours. Coags:   Lab Results   Component Value Date    PROTIME 13.6 02/24/2022    INR 1.3 02/24/2022    APTT 34.7 12/27/2020       HCG (If Applicable): No results found for: PREGTESTUR, PREGSERUM, HCG, HCGQUANT     ABGs: No results found for: PHART, PO2ART, ZBQ5UAC, RCD6UFY, BEART, D8QHPFIW     Type & Screen (If Applicable):  No results found for: LABABO, LABRH    Drug/Infectious Status (If Applicable):  No results found for: HIV, HEPCAB    COVID-19 Screening (If Applicable): No results found for: COVID19        Anesthesia Evaluation  Patient summary reviewed and Nursing notes reviewed no history of anesthetic complications:   Airway: Mallampati: II  TM distance: >3 FB   Neck ROM: full  Mouth opening: > = 3 FB Dental:          Pulmonary:normal exam        (-) not a current smoker (former smoker)                           Cardiovascular:  Exercise tolerance: good (>4 METS),   (+) hypertension:, dysrhythmias: atrial fibrillation, hyperlipidemia               ROS comment: Echo Feb 2022    Summary   Ejection fraction is visually estimated at 50%. No regional wall motion abnormalities seen. Moderate left ventricular concentric hypertrophy noted. Normal right ventricle structure and function. Left atrial volume index of 31 ml per meters squared BSA. Mild aortic regurgitation is noted. Mild to moderate mitral regurgitation is present. Mild tricuspid regurgitation. RVSP is 35 mmHg.    No evidence for hemodynamically significant pericardial effusion     Neuro/Psych: Negative Neuro/Psych ROS              GI/Hepatic/Renal: Neg GI/Hepatic/Renal ROS            Endo/Other:    (+) blood dyscrasia: anticoagulation therapy:., .                 Abdominal:             Vascular: negative vascular ROS. ROS comment: Thoracic aortic aneurysm - followed by Dr Carol Soria. Other Findings:             Anesthesia Plan      MAC     ASA 3       Induction: intravenous. Anesthetic plan and risks discussed with patient. Plan discussed with CRNA.                   Vikas Mora MD   2/25/2022

## 2022-02-25 NOTE — BRIEF OP NOTE
Brief Postoperative Note      Patient: Catrina Ochoa  YOB: 1950  MRN: [id number]    Date of Procedure: 2/25/2022    Pre-Op Diagnosis: iron deficiency anemia  Post-Op Diagnosis: small amount of fresh blood at the gastric mucosal side of the squamoucolumnar jxn. Esophagus and remainder stomach and duodenum are normal. Bx obtained but there is no ulcer or neoplasm here. Procedure(s):  EGD BIOPSY    Surgeon(s):  Angie Reid MD    Assistant:  * No surgical staff found *    Anesthesia: Monitor Anesthesia Care    Estimated Blood Loss (mL): Minimal    Complications: None    Specimens:   ID Type Source Tests Collected by Time Destination   A : Yolanda Blake MD 2/25/2022 1431        Implants:  * No implants in log *      Drains: * No LDAs found *    Findings: Not certain what this represents as it very narrow area and does not extend into the esophagus.  RX with PPI for now    Electronically signed by Angie Ried MD on 2/25/2022 at 2:49 PM

## 2022-02-25 NOTE — PROGRESS NOTES
[facility] Quality Flow/Interdisciplinary Rounds Progress Note        Quality Flow Rounds held on February 25, 2022    Disciplines Attending:  Bedside Nurse, ,  and Nursing Unit Leadership    Olga Sargent was admitted on 2/23/2022  8:24 AM    Anticipated Discharge Date:  Expected Discharge Date: 03/19/22    Disposition:    Bennett Score:  Bennett Scale Score: 22    Readmission Risk              Risk of Unplanned Readmission:  12           Discussed patient goal for the day, patient clinical progression, and barriers to discharge. The following Goal(s) of the Day/Commitment(s) have been identified:  EGD today-discharge afterwards if ok with GI.       eKvin Garcia RN  February 25, 2022

## 2022-02-25 NOTE — ANESTHESIA POSTPROCEDURE EVALUATION
Department of Anesthesiology  Postprocedure Note    Patient: Daniel Burn  MRN: [id number]  YOB: 1950  Date of evaluation: 2/25/2022  Time:  2:37 PM     Procedure Summary     Date: 02/25/22 Room / Location: [facility] 02 / [facility]    Anesthesia Start: 9799 Anesthesia Stop: 3991    Procedure: EGD BIOPSY (N/A ) Diagnosis: (/)    Surgeons: Michael Christianson MD Responsible Provider: Alida Muñoz MD    Anesthesia Type: MAC ASA Status: 3          Anesthesia Type: MAC    Caroline Phase I:      Caroline Phase II:      Last vitals: Reviewed and per EMR flowsheets. Anesthesia Post Evaluation    Patient location: [facility].   Patient participation: complete - patient participated  Level of consciousness: awake  Pain score: 0  Airway patency: patent  Nausea & Vomiting: no nausea and no vomiting  Complications: no  Cardiovascular status: blood pressure returned to baseline and hemodynamically stable  Respiratory status: acceptable  Hydration status: euvolemic

## 2022-02-25 NOTE — CARE COORDINATION
Social work / Discharge planning:       Discharge plan is home. Per IDR, plan is for EGD at 2:30pm today and patient is hopeful for discharge after. No needs identified.    Electronically signed by BIBI Norman on 2/25/2022 at 8:55 AM

## 2022-02-25 NOTE — DISCHARGE SUMMARY
[facility]  Hospitalist       Hospitalist Physician Discharge Summary       DO [address] [place] [address]  [address], DO  [address]  [address] [facility],[address] [address]  [phone]    Schedule an appointment as soon as possible for a visit  For follow-up    Reanna Hutchison MD  [address] [facility]  [phone]    Schedule an appointment as soon as possible for a visit in 1 week  For follow-up, Please call for follow up post hospital stay appt. DO [address] [place] [address]  [phone]    Schedule an appointment as soon as possible for a visit in 1 week  Please call for follow up post hospital stay appt. Activity level: As tolerated    Diet: ADULT DIET; Regular      Condition at discharge: Stable    Dispo:Home        Patient ID:  Olga Sargent  [id number]  63 y.o.  1950    Admit date: 2/23/2022    Discharge date and time:  2/25/2022  5:01 PM    Admission Diagnoses: Principal Problem:    Anemia  Resolved Problems:    * No resolved hospital problems. *      Discharge Diagnoses: Principal Problem:    Anemia  Resolved Problems:    * No resolved hospital problems. *      Consults:  IP CONSULT TO CARDIOLOGY  IP CONSULT TO GI    Procedures:  EGD--Dr. Grimes Scripture Course:     323/22 74year old male PMH  atrial fibrillation on chronic eliquis, HTN, HLD and thoracic aneurysm presented to [address] [place] [address] ED with complaints of bnormal lab findings. Pt was seen at PCP office one day prior to presentation and had las obtained at that time. Pt received call from PCP to present to ED. 1. Symptomatic Acute Microcytic Anemia-PCP due to abnormal lab work drawn yesterday- showing anemia with hg 7.7. He is on chronic anticoagulation- eliquis for h/o Atrial fibrillation. Hx recent normal Colonoscopy. Hgb 7.6 upon presentation. CTA Abdomen Diverticulosis.  CTA Chest stable thoracic aneurysm 4. 2X4.1cm. Hold Eliquis. NPO. Protonix BID. Occult stool. Iron panel. No overt signs of bleeding noted. Follow transfuse as needed. EGD today GI input appreciated. 2. Atrial Fibrillation- HR controlled. EKG with SR. Holding Eliquis 2/2 possible GI bleed. SCDs. Telemetry monitoring. Continue Metoprolol. 3.  NSVT- 2 runs of NSVT. Mg+ 1.9. K+4.0 Continue BB/titrating per cardiology. Telemetry monitoring. ECHO/Stress. Cardiology input appreciated. 4. HTN- Continue Metoprolol/ramipril. Follow adjust as needed  5. Thoracic Aneurysm- CTA 4.2x4.1cm Stable since last CT. Will need continue follow up. Pt Hgb stabilized. No overt signs of bleeding noted. EGD performed revealing small amount of fresh blood at the gastric mucosal side of the squamoucolumnar jxn. Esophagus and remainder stomach and duodenum are normal. Bx obtained but there is no ulcer or neoplasm per Dr. Maxi Mccoy. Plan to treat with PPI for now. Pt is hemodynamically stable and can be discharged at this time. Pt will be instructed to follow up with PCP and GI Upon DC.        Discharge Exam:  Vitals:    02/25/22 1445 02/25/22 1500 02/25/22 1515 02/25/22 1630   BP: (!) 153/76 (!) 170/86 (!) 174/92 (!) 145/106   Pulse: 71 68 70 71   Resp: 16 16 16 18   Temp: 97.8 °F (36.6 °C)   98.1 °F (36.7 °C)   TempSrc: Temporal   Oral   SpO2: 96% 97% 98% 99%   Weight:       Height:         General Appearance: alert and oriented to person, place and time and in no acute distress  Skin: warm and dry  Head: normocephalic and atraumatic  Eyes: pupils equal, round, and reactive to light, extraocular eye movements intact, conjunctivae normal  Neck: neck supple and non tender without mass   Pulmonary/Chest: clear to auscultation bilaterally- no wheezes, rales or rhonchi, normal air movement, no respiratory distress  Cardiovascular: normal rate, normal S1 and S2 and no RGM  Abdomen: soft, non-tender, non-distended, normal bowel sounds, no masses or organomegaly  Extremities: no cyanosis, no clubbing and no edema  Neurologic: no cranial nerve deficit and speech normal    No intake/output data recorded. I/O this shift:  In: 100 [I.V.:100]  Out: -       LABS:  Recent Labs     02/23/22  0907 02/24/22  0908 02/25/22  0420    138 135   K 4.0 4.2 3.9    104 100   CO2 24 25 24   BUN 21 16 17   CREATININE 1.0 1.0 1.2   GLUCOSE 100* 94 103*   CALCIUM 8.6 9.1 8.9       Recent Labs     02/23/22  1734 02/23/22  1734 02/24/22  0908 02/24/22  1314 02/25/22  0930   WBC 7.7  --  5.6 6.2  --    RBC 4.46  --  4.49 4.72  --    HGB 7.8*   < > 7.8* 8.3* 8.1*   HCT 28.7*   < > 28.9* 30.5* 30.0*   MCV 64.3*  --  64.4* 64.6*  --    MCH 17.5*  --  17.4* 17.6*  --    MCHC 27.2*  --  27.0* 27.2*  --    RDW 19.8*  --  19.7* 19.9*  --      --  166 212  --    MPV NOT CALC  --  NOT CALC NOT CALC  --     < > = values in this interval not displayed. No results for input(s): POCGLU in the last 72 hours. Imaging:  CTA CHEST W CONTRAST    Result Date: 2/23/2022  EXAMINATION: CTA OF THE CHEST 2/23/2022 11:03 am TECHNIQUE: CTA of the chest was performed after the administration of intravenous contrast.  Multiplanar reformatted images are provided for review. MIP images are provided for review. Dose modulation, iterative reconstruction, and/or weight based adjustment of the mA/kV was utilized to reduce the radiation dose to as low as reasonably achievable. COMPARISON: 08/09/2021 and 07/30/2020 HISTORY: ORDERING SYSTEM PROVIDED HISTORY: hx of aneurysm TECHNOLOGIST PROVIDED HISTORY: Reason for exam:->hx of aneurysm Decision Support Exception - unselect if not a suspected or confirmed emergency medical condition->Emergency Medical Condition (MA) FINDINGS: Pulmonary Arteries: No gross pulmonary embolus is noted. The pulmonary trunk is normal caliber. Mediastinum: No evidence of mediastinal lymphadenopathy. The heart is not enlarged. There is no pericardial effusion.   There is moderate calcified plaque seen within the coronary arteries. There is stable aneurysmal dilatation of the ascending thoracic aorta measuring 4.2 x 4.1 cm in maximum AP and transverse diameter. The aortic arch is normal caliber. The descending thoracic aorta is normal caliber. Lungs/pleura: The lungs are without acute process. No focal consolidation or pulmonary edema. No evidence of pleural effusion or pneumothorax. Upper Abdomen: The abdomen will did dictated separately. Soft Tissues/Bones: No acute bone or soft tissue abnormality. 1. Stable aneurysmal dilatation of the ascending thoracic aorta measuring 4.2 x 4.1 cm. This finding is unchanged compared to prior study of 08/09/2021 and 07/30/2020. Yearly follow-up CT scan is recommended to confirm stability. 2. There is no aneurysm of the aortic arch or descending thoracic aorta. 3. The lungs are clear. RECOMMENDATIONS: Yearly follow-up CT scan is recommended     CTA ABDOMEN PELVIS W CONTRAST    Result Date: 2/23/2022  EXAMINATION: CTA OF THE ABDOMEN AND PELVIS WITH CONTRAST 2/23/2022 11:03 am: TECHNIQUE: CTA of the abdomen and pelvis was performed with the administration of intravenous contrast. Multiplanar reformatted images are provided for review. MIP images are provided for review. Dose modulation, iterative reconstruction, and/or weight based adjustment of the mA/kV was utilized to reduce the radiation dose to as low as reasonably achievable. COMPARISON: None. HISTORY: ORDERING SYSTEM PROVIDED HISTORY: hx of aneurysm TECHNOLOGIST PROVIDED HISTORY: Reason for exam:->hx of aneurysm Decision Support Exception - unselect if not a suspected or confirmed emergency medical condition->Emergency Medical Condition (MA) FINDINGS: There is mild fatty infiltration of the liver. The spleen, pancreas, adrenal glands and kidneys are unremarkable. There is no obstructive uropathy. There are linear dense foci seen just superior to the right renal vein of uncertain etiology.   These could represent clips or calcified plaque. There are several small stones seen within the gallbladder lumen. There is no evidence of acute cholecystitis. The stomach is normally distended. There is no large or small bowel obstruction. Scattered diverticula noted. The appendix is visualized and is unremarkable. No evidence of acute appendicitis. Bladder is unremarkable in appearance. Bilateral inguinal hernias are noted containing mesenteric fat only. Age related degenerative changes of the visualized osseous structures without focal destructive lesion. CTA ABDOMEN: The abdominal aorta is normal caliber. There is no aneurysm or dissection. Minimal peripheral calcified plaque is seen within the infrarenal abdominal aorta. The celiac artery, superior mesenteric artery, renal arteries and inferior mesenteric artery are widely patent. The aortic bifurcation is patent. CTA PELVIS: The right and left common and external iliac arteries are widely patent. The common femoral arteries are widely patent. 1. There is no abdominal aortic aneurysm or dissection 2. Cholelithiasis. There is no evidence of acute cholecystitis. 3. Colonic diverticulosis. There are no findings of diverticulitis.          Patient Instructions:   Current Discharge Medication List      START taking these medications    Details   ferrous sulfate (IRON 325) 325 (65 Fe) MG tablet Take 1 tablet by mouth daily (with breakfast)  Qty: 30 tablet, Refills: 3         CONTINUE these medications which have CHANGED    Details   metoprolol tartrate (LOPRESSOR) 50 MG tablet Take 1 tablet by mouth 2 times daily  Qty: 60 tablet, Refills: 3      pantoprazole (PROTONIX) 40 MG tablet Take 1 tablet by mouth 2 times daily (before meals)  Qty: 180 tablet, Refills: 1         CONTINUE these medications which have NOT CHANGED    Details   acetaminophen (TYLENOL 8 HOUR ARTHRITIS PAIN) 650 MG extended release tablet Take 650 mg by mouth 2 times daily as needed for Pain atorvastatin (LIPITOR) 40 MG tablet Take 40 mg by mouth nightly      apixaban (ELIQUIS) 5 MG TABS tablet Take 5 mg by mouth 2 times daily      ramipril (ALTACE) 10 MG capsule Take 10 mg by mouth 2 times daily      cetirizine (ZYRTEC) 5 MG tablet Take 5 mg by mouth nightly                Note that 34 minutes was spent in preparing discharge papers, discussing discharge with patient, medication review, etc.    NOTE: This report was transcribed using voice recognition software. Every effort was made to ensure accuracy; however, inadvertent computerized transcription errors may be present. Signed:  Electronically signed by Kiersten Bill CNP on 2/25/2022 at 5:01 PM     Addendum: I have personally participated in the history, exam, medical decision making with Benito Arnold on the date of service and I agree with all of the pertinent clinical information unless otherwise noted. I have also reviewed and agree with the past medical, family, and social history unless otherwise noted. Patient was admitted with anemia    PHYSICAL EXAM:  Vitals:  BP (!) 145/106   Pulse 71   Temp 98.1 °F (36.7 °C) (Oral)   Resp 18   Ht 5' 6\" (1.676 m)   Wt 203 lb 14.8 oz (92.5 kg)   SpO2 99%   BMI 32.91 kg/m²   Gen: awake, alert, NAD  Lungs: clear to auscultation bilaterally no crackles no wheezing. Heart: RRR, no murmur   Abdomen: soft nontender nondistended positive bowel sounds. Extremities: full range of motion no peripheral edema. Impression:  Principal Problem:    Anemia  Resolved Problems:    * No resolved hospital problems. *      My findings/plan include:    Patient has acute on chronic anemia and was evaluated by GI. EGD was done which did not show any active bleeding. Patient was very adamant of wanting to go home. He is being discharged and will follow up with GI as outpatient. NOTE: This report was transcribed using voice recognition software.  Every effort was made to ensure accuracy; however, inadvertent computerized transcription errors may be present.     Electronically signed by Esa Murcia DO on 2/25/22 at 11:41 AM

## 2022-02-25 NOTE — PROGRESS NOTES
[facility] Cardiology Inpatient Progress Note    Patient is a 74 y.o. male of Lynsey Rosas DO seen in hospital follow up. Chief complaint: NSVT/JUAREZ/Anemia    HPI: No CP or SOB.      Patient Active Problem List   Diagnosis    Hypertension    Hyperlipidemia    Paroxysmal atrial fibrillation (HCC)    Valvular heart disease    Thoracic ascending aortic aneurysm (HCC)    Anemia       Allergies   Allergen Reactions    Pcn [Penicillins] Itching, Swelling and Rash       Current Facility-Administered Medications   Medication Dose Route Frequency Provider Last Rate Last Admin    0.9 % sodium chloride infusion  500 mL IntraVENous Continuous SO Zhang - CNP 50 mL/hr at 02/25/22 0238 500 mL at 02/25/22 0238    ramipril (ALTACE) capsule 10 mg  10 mg Oral BID Alize Giles MD   10 mg at 02/24/22 2050    sodium chloride flush 0.9 % injection 5-40 mL  5-40 mL IntraVENous 2 times per day Alize Giles MD   10 mL at 02/24/22 2051    sodium chloride flush 0.9 % injection 5-40 mL  5-40 mL IntraVENous PRN Alize Giles MD   10 mL at 02/24/22 0909    0.9 % sodium chloride infusion  25 mL IntraVENous PRN Alize Giles  mL/hr at 02/23/22 1518 25 mL at 02/23/22 1518    ondansetron (ZOFRAN-ODT) disintegrating tablet 4 mg  4 mg Oral Q8H PRN Alize Giles MD        Or    [facility]ron [facility]) injection 4 mg  4 mg IntraVENous Q6H PRN Alize Giles MD        polyethylene glycol [facility]) packet 17 g  17 g Oral Daily PRN Alize Giles MD        acetaminophen (TYLENOL) tablet 650 mg  650 mg Oral Q6H PRN Alize Giles MD   650 mg at 02/24/22 2050    Or    acetaminophen (TYLENOL) suppository 650 mg  650 mg Rectal Q6H PRN Alize Giles MD        pantoprazole (PROTONIX) injection 40 mg  40 mg IntraVENous BID Alize Giles MD   40 mg at 02/24/22 2059    And    sodium chloride (PF) 0.9 % injection 10 mL  10 mL IntraVENous BID Alize Giles MD   10 mL at 02/24/22 2059    perflutren lipid microspheres (DEFINITY) injection 1.65 mg  1.5 mL IntraVENous ONCE PRN Gamaliel Keller DO        metoprolol tartrate (LOPRESSOR) tablet 50 mg  50 mg Oral BID Gamaliel Keller DO   50 mg at 02/24/22 2050    ferrous sulfate (IRON 325) tablet 325 mg  325 mg Oral Daily with breakfast Leslie Gutierrez MD   325 mg at 02/24/22 4513    polyethylene glycol (GLYCOLAX) packet 17 g  17 g Oral Daily Leslie Gutierrez MD           Review of systems:   Heart: as above   Lungs: as above   Eyes: denies changes in vision or discharge. Ears: denies changes in hearing or pain. Nose: denies epistaxis or masses   Throat: denies sore throat or trouble swallowing. Neuro: denies numbness, tingling, tremors. Skin: denies rashes or itching. : denies hematuria, dysuria   GI: denies vomiting, diarrhea   Psych: denies mood changed, anxiety, depression. Physical Exam   BP (!) 152/76   Pulse 73   Temp 96.2 °F (35.7 °C) (Oral)   Resp 15   Ht 5' 6\" (1.676 m)   Wt 203 lb 14.8 oz (92.5 kg)   SpO2 96%   BMI 32.91 kg/m²   Constitutional: Oriented to person, place, and time. No distress. Well developed. Head: Normocephalic and atraumatic. Neck: Neck supple. No hepatojugular reflux. No JVD present. Carotid bruit is not present. No tracheal deviation present. No thyromegaly present. Cardiovascular: Normal rate, regular rhythm, normal heart sounds. intact distal pulses. No gallop and no friction rub. No murmur heard. Pulmonary: Breath sounds normal. No respiratory distress. No wheezes. No rales. Chest: Effort normal. No tenderness. Abdominal: Soft. Bowel sounds are normal. No distension or mass. No tenderness, rebound or guarding. Musculoskeletal: . No tenderness. No clubbing or cyanosis. Extremitites: Intact distal pulses. No edema  Neurological: Alert and oriented to person, place, and time. Skin: Skin is warm and dry. No rash noted. Not diaphoretic. No erythema. Psychiatric: Normal mood and affect.  Behavior is normal.   Lymphadenopathy: No cervical adenopathy. No groin adenopathy. CBC:   Lab Results   Component Value Date    WBC 6.2 02/24/2022    RBC 4.72 02/24/2022    HGB 8.3 02/24/2022    HCT 30.5 02/24/2022    MCV 64.6 02/24/2022    MCH 17.6 02/24/2022    MCHC 27.2 02/24/2022    RDW 19.9 02/24/2022     02/24/2022    MPV NOT CALC 02/24/2022     BMP:   Lab Results   Component Value Date     02/25/2022    K 3.9 02/25/2022    K 4.0 02/23/2022     02/25/2022    CO2 24 02/25/2022    BUN 17 02/25/2022    LABALBU 4.1 02/23/2022    CREATININE 1.2 02/25/2022    CALCIUM 8.9 02/25/2022    GFRAA >60 02/25/2022    LABGLOM 59 02/25/2022     Magnesium:    Lab Results   Component Value Date    MG 1.9 02/23/2022     Cardiac Enzymes:   Lab Results   Component Value Date    TROPHS 14 (H) 02/23/2022      PT/INR:    Lab Results   Component Value Date    PROTIME 13.6 02/24/2022    INR 1.3 02/24/2022     TSH:    Lab Results   Component Value Date    TSH 1.190 02/22/2022     Rhythm Strip: normal sinus rhythm. Echo Summary 2/25/2022:   Ejection fraction is visually estimated at 50%. No regional wall motion abnormalities seen. Moderate left ventricular concentric hypertrophy noted. Normal right ventricle structure and function. Left atrial volume index of 31 ml per meters squared BSA. Mild aortic regurgitation is noted. Mild to moderate mitral regurgitation is present. Mild tricuspid regurgitation. RVSP is 35 mmHg. No evidence for hemodynamically significant pericardial effusion. ASSESSMENT & PLAN:    Patient Active Problem List   Diagnosis    Hypertension    Hyperlipidemia    Paroxysmal atrial fibrillation (HCC)    Valvular heart disease    Thoracic ascending aortic aneurysm (HCC)    Anemia     1. NSVT:    BB. Maintain K and mag. Echo as above. Stress when anemia stable, can be done as outpatient. 2. JUAREZ/Chest symptoms    Echo as above. Stress when anemia stable. 3. PAFlutter: In sinus. EMILY/eliquis(when safe). 4. VHD: Mild AI and mild/mod MR echo 2/25/2022.     5. Anemia: Per GI and primary service. 6. HTN: Observe. Titrate medications as needed. 7. Lipids: Statin. 8. TAA: 42 mm. Observe. Patient stable from CV standpoint. Please call if needed. TYVM. Iris Alvarado D.O.   Cardiologist  Cardiology, [facility]

## 2022-02-25 NOTE — PROGRESS NOTES
For EGD today with Dr. Amy Atkinson. All additional questions and concerns were addressed. Chart and labs reviewed. Assessment unchanged.     SO Morales - CNP 2/25/2022 8:47 AM

## 2022-02-26 LAB
FERRITIN: 7 NG/ML
IRON SATURATION: 10 % (ref 20–55)
IRON: 37 MCG/DL (ref 59–158)
TOTAL IRON BINDING CAPACITY: 353 MCG/DL (ref 250–450)

## 2022-02-28 ENCOUNTER — TELEPHONE (OUTPATIENT)
Dept: CARDIOLOGY | Age: 72
End: 2022-02-28

## 2022-02-28 ENCOUNTER — TELEPHONE (OUTPATIENT)
Dept: ADMINISTRATIVE | Age: 72
End: 2022-02-28

## 2022-02-28 NOTE — TELEPHONE ENCOUNTER
One month  Have him as the GI doctor when he sees him in f/u when he can restart his eliquis.  That would be his decision, not mine

## 2022-02-28 NOTE — TELEPHONE ENCOUNTER
Pt calling for HFU apt/timing with Dr. Moose Childress dx: NSVT/JUAREZ/Anema -  discharged on: 2/26/22. He is going to cancel his upcoming echo (performed at the hospital).

## 2022-03-02 ENCOUNTER — OFFICE VISIT (OUTPATIENT)
Dept: PRIMARY CARE CLINIC | Age: 72
End: 2022-03-02
Payer: MEDICARE

## 2022-03-02 VITALS
HEIGHT: 66 IN | OXYGEN SATURATION: 98 % | HEART RATE: 70 BPM | DIASTOLIC BLOOD PRESSURE: 78 MMHG | TEMPERATURE: 97 F | WEIGHT: 207 LBS | SYSTOLIC BLOOD PRESSURE: 135 MMHG | BODY MASS INDEX: 33.27 KG/M2

## 2022-03-02 DIAGNOSIS — D64.9 ANEMIA, UNSPECIFIED TYPE: ICD-10-CM

## 2022-03-02 DIAGNOSIS — I48.0 PAROXYSMAL ATRIAL FIBRILLATION (HCC): Primary | ICD-10-CM

## 2022-03-02 PROCEDURE — 1111F DSCHRG MED/CURRENT MED MERGE: CPT | Performed by: FAMILY MEDICINE

## 2022-03-02 PROCEDURE — 99214 OFFICE O/P EST MOD 30 MIN: CPT | Performed by: FAMILY MEDICINE

## 2022-03-02 NOTE — PROGRESS NOTES
Post-Discharge Transitional Care  Follow Up      Roland Rhodes   YOB: 1950    Date of Office Visit:  3/2/2022  Date of Hospital Admission: 2/23/22  Date of Hospital Discharge: 2/25/22  Risk of hospital readmission (high >=14%. Medium >=10%) :Readmission Risk Score: 10 ( )      Care management risk score Rising risk (score 2-5) and Complex Care (Scores >=6): 1     Non face to face  following discharge, date last encounter closed (first attempt may have been earlier): *No documented post hospital discharge outreach found in the last 14 days    Call initiated 2 business days of discharge: *No response recorded in the last 14 days    ASSESSMENT/PLAN:   Paroxysmal atrial fibrillation ([facility] [address]  -     MO DISCHARGE MEDS RECONCILED W/ CURRENT OUTPATIENT MED LIST  -     CBC with Auto Differential; Future  -     Basic Metabolic Panel; Future  Anemia, unspecified type  -     MO DISCHARGE MEDS RECONCILED W/ CURRENT OUTPATIENT MED LIST  -     CBC with Auto Differential; Future  -     Basic Metabolic Panel; Future      Medical Decision Making: moderate complexity  Return in 3 months (on 6/2/2022). Subjective:   HPI:  Follow up of Hospital problems/diagnosis(es): anemia      Inpatient course: Discharge summary reviewed- see chart.     Interval history/Current status: improved    Patient Active Problem List   Diagnosis    Hypertension    Hyperlipidemia    Paroxysmal atrial fibrillation (HCC)    Valvular heart disease    Thoracic ascending aortic aneurysm (HCC)    Anemia       Medications listed as ordered at the time of discharge from hospital  [unfilled]      Medications marked \"taking\" at this time  Outpatient Medications Marked as Taking for the 3/2/22 encounter (Office Visit) with Domenic Viramontes, DO   Medication Sig Dispense Refill    metoprolol tartrate (LOPRESSOR) 50 MG tablet Take 1 tablet by mouth 2 times daily 60 tablet 3    ferrous sulfate (IRON 325) 325 (65 Fe) MG tablet Take 1 tablet by mouth daily (with breakfast) 30 tablet 3    pantoprazole (PROTONIX) 40 MG tablet Take 1 tablet by mouth 2 times daily (before meals) 180 tablet 1    acetaminophen (TYLENOL 8 HOUR ARTHRITIS PAIN) 650 MG extended release tablet Take 650 mg by mouth 2 times daily as needed for Pain      atorvastatin (LIPITOR) 40 MG tablet Take 40 mg by mouth nightly      apixaban (ELIQUIS) 5 MG TABS tablet Take 5 mg by mouth 2 times daily      ramipril (ALTACE) 10 MG capsule Take 10 mg by mouth 2 times daily      cetirizine (ZYRTEC) 5 MG tablet Take 5 mg by mouth nightly           Medications patient taking as of now reconciled against medications ordered at time of hospital discharge: Yes    A comprehensive review of systems was negative except for what was noted in the HPI.     Objective:    /78   Pulse 70   Temp 97 °F (36.1 °C)   Ht 5' 6\" (1.676 m)   Wt 207 lb (93.9 kg)   SpO2 98%   BMI 33.41 kg/m²   General Appearance: alert and oriented to person, place and time, well developed and well- nourished, in no acute distress  Skin: warm and dry, no rash or erythema  Head: normocephalic and atraumatic  Eyes: pupils equal, round, and reactive to light, extraocular eye movements intact, conjunctivae normal  ENT: tympanic membrane, external ear and ear canal normal bilaterally, nose without deformity, nasal mucosa and turbinates normal without polyps  Neck: supple and non-tender without mass, no thyromegaly or thyroid nodules, no cervical lymphadenopathy  Pulmonary/Chest: clear to auscultation bilaterally- no wheezes, rales or rhonchi, normal air movement, no respiratory distress  Cardiovascular: normal rate, regular rhythm, normal S1 and S2, no murmurs, rubs, clicks, or gallops, distal pulses intact, no carotid bruits  Abdomen: soft, non-tender, non-distended, normal bowel sounds, no masses or organomegaly  Extremities: no cyanosis, clubbing or edema  Musculoskeletal: normal range of motion, no joint swelling, deformity or tenderness  Neurologic: reflexes normal and symmetric, no cranial nerve deficit, gait, coordination and speech normal      An electronic signature was used to authenticate this note.   --Mohinder Song, DO

## 2022-03-15 NOTE — OP NOTE
[address] [address]                                OPERATIVE REPORT    PATIENT NAME: Krystal Mtz                   :        1950  MED REC NO:   [id number]                            ROOM:       0618  ACCOUNT NO:   [de-identified]                           ADMIT DATE: 2022  PROVIDER:     Socrates Gomez MD    DATE OF PROCEDURE:  2022    PROCEDURE PERFORMED:  Esophagogastroduodenoscopy plus biopsy. PREOPERATIVE DIAGNOSIS:  Iron deficiency anemia, colonoscopy in . POSTOPERATIVE DIAGNOSES:  There is a small amount of fresh blood with  inflammatory changes at the level of the gastroesophageal junction,  exclusively on the gastric side, biopsies obtained in the remaining  portion of the stomach and duodenum were normal.    INDICATIONS:  He is 72. He has a history of atrial fibrillation. He is  anticoagulated. He was admitted to the hospital with evidence of iron  deficiency anemia. He had had a colonoscopy completed last year with  unremarkable results. He is on a proton pump inhibitor at this point. Preop is monitored anesthesia care. DESCRIPTION OF PROCEDURE:  With the patient in left lateral decubitus  position, he was sedated. He had a bite block in place. The Olympus  GIF-H190 video endoscope was guided into the cervical esophagus. The  esophagus was unremarkable. At the gastroesophageal junction, there was  small amount of bright red blood, but no active bleeding. It was on a  small rim of tissue on the gastric side of the squamocolumnar transition  without an associated hiatal hernia. The endoscope was then passed into  the gastric chamber and retroflexion revealed a normal fundus and  cardia, body, antrum, pylorus, bulb, and postbulbar duodenum.   The  endoscope was withdrawn to the GE junction, which was again closely  inspected and the underlying mucosa and at least half the circumference  of the GE junction appeared to have underlying inflammatory changes, but  maeve ulceration was not seen. Three biopsies were obtained from the  most involved areas. The stomach was decompressed and the procedure  terminated. ASSESSMENT:  I am not certain what this represents. Again, there are no  inflammatory changes in the esophagus and everything distal to this very  short segment, a few millimeters in width is normal throughout the  gastric chamber and duodenum. Biopsies are pending. In the meantime,  escalate his dose of proton pump inhibitors to twice a day and resume  his Eliquis and discharge is safe for outpatient followup.         Irineo Brooks MD    D: 03/14/2022 8:55:03       T: 03/14/2022 8:59:20     [id number]  Job#: [id number]     Doc#: [id number]    CC:

## 2022-03-23 ENCOUNTER — TELEPHONE (OUTPATIENT)
Dept: CARDIOLOGY | Age: 72
End: 2022-03-23

## 2022-03-23 NOTE — TELEPHONE ENCOUNTER
Spoke with patient and confirmed nuclear stress test appointment on March 28, 2022 at 0830. Instructions for test, including holding lopressor, and COVID-19 preprocedure checklist reviewed with patient, questions answered.

## 2022-03-25 RX ORDER — METOPROLOL TARTRATE 50 MG/1
50 TABLET, FILM COATED ORAL 2 TIMES DAILY
Qty: 180 TABLET | Refills: 1 | Status: SHIPPED
Start: 2022-03-25 | End: 2022-03-28 | Stop reason: ALTCHOICE

## 2022-03-25 RX ORDER — PANTOPRAZOLE SODIUM 40 MG/1
40 TABLET, DELAYED RELEASE ORAL
Qty: 180 TABLET | Refills: 1 | Status: SHIPPED
Start: 2022-03-25 | End: 2022-08-11

## 2022-03-28 ENCOUNTER — HOSPITAL ENCOUNTER (OUTPATIENT)
Dept: CARDIOLOGY | Age: 72
Discharge: HOME | End: 2022-03-28
Payer: MEDICARE

## 2022-03-28 ENCOUNTER — TELEPHONE (OUTPATIENT)
Dept: CARDIOLOGY CLINIC | Age: 72
End: 2022-03-28

## 2022-03-28 VITALS
RESPIRATION RATE: 12 BRPM | SYSTOLIC BLOOD PRESSURE: 142 MMHG | HEART RATE: 72 BPM | HEIGHT: 66 IN | DIASTOLIC BLOOD PRESSURE: 80 MMHG | WEIGHT: 204 LBS | BODY MASS INDEX: 32.78 KG/M2

## 2022-03-28 DIAGNOSIS — R06.09 DOE (DYSPNEA ON EXERTION): ICD-10-CM

## 2022-03-28 DIAGNOSIS — I71.21 THORACIC ASCENDING AORTIC ANEURYSM: ICD-10-CM

## 2022-03-28 DIAGNOSIS — I38 VALVULAR HEART DISEASE: ICD-10-CM

## 2022-03-28 DIAGNOSIS — I48.0 PAROXYSMAL ATRIAL FIBRILLATION (HCC): ICD-10-CM

## 2022-03-28 DIAGNOSIS — R06.09 DOE (DYSPNEA ON EXERTION): Primary | ICD-10-CM

## 2022-03-28 LAB
LV EF: 39 %
LVEF MODALITY: NORMAL

## 2022-03-28 PROCEDURE — 93017 CV STRESS TEST TRACING ONLY: CPT

## 2022-03-28 PROCEDURE — 78452 HT MUSCLE IMAGE SPECT MULT: CPT

## 2022-03-28 PROCEDURE — 2580000003 HC RX 258: Performed by: INTERNAL MEDICINE

## 2022-03-28 PROCEDURE — A9500 TC99M SESTAMIBI: HCPCS | Performed by: INTERNAL MEDICINE

## 2022-03-28 PROCEDURE — 3430000000 HC RX DIAGNOSTIC RADIOPHARMACEUTICAL: Performed by: INTERNAL MEDICINE

## 2022-03-28 RX ORDER — SODIUM CHLORIDE 0.9 % (FLUSH) 0.9 %
10 SYRINGE (ML) INJECTION PRN
Status: DISCONTINUED | OUTPATIENT
Start: 2022-03-28 | End: 2022-03-29 | Stop reason: HOSPADM

## 2022-03-28 RX ORDER — METOPROLOL TARTRATE 50 MG/1
TABLET, FILM COATED ORAL
Qty: 270 TABLET | Refills: 0 | Status: SHIPPED
Start: 2022-03-28 | End: 2022-09-08 | Stop reason: SDUPTHER

## 2022-03-28 RX ADMIN — SODIUM CHLORIDE, PRESERVATIVE FREE 10 ML: 5 INJECTION INTRAVENOUS at 11:16

## 2022-03-28 RX ADMIN — SODIUM CHLORIDE, PRESERVATIVE FREE 10 ML: 5 INJECTION INTRAVENOUS at 08:37

## 2022-03-28 RX ADMIN — Medication 30.3 MILLICURIE: at 11:15

## 2022-03-28 RX ADMIN — Medication 10.4 MILLICURIE: at 08:37

## 2022-03-28 NOTE — TELEPHONE ENCOUNTER
Increase lopressor to 100 in am and 50 in pm  Give ov for week of 4/11 to discuss further     Patient notified and instructed. Chart reflects new dosage on Lopressor.

## 2022-03-29 ENCOUNTER — TELEPHONE (OUTPATIENT)
Dept: CARDIOLOGY CLINIC | Age: 72
End: 2022-03-29

## 2022-03-29 ENCOUNTER — OFFICE VISIT (OUTPATIENT)
Dept: CARDIOLOGY CLINIC | Age: 72
End: 2022-03-29
Payer: MEDICARE

## 2022-03-29 VITALS
HEIGHT: 66 IN | DIASTOLIC BLOOD PRESSURE: 80 MMHG | SYSTOLIC BLOOD PRESSURE: 152 MMHG | RESPIRATION RATE: 18 BRPM | WEIGHT: 205.8 LBS | BODY MASS INDEX: 33.07 KG/M2 | HEART RATE: 65 BPM

## 2022-03-29 DIAGNOSIS — I48.0 PAROXYSMAL ATRIAL FIBRILLATION (HCC): ICD-10-CM

## 2022-03-29 DIAGNOSIS — Z92.89 HISTORY OF NUCLEAR STRESS TEST: ICD-10-CM

## 2022-03-29 DIAGNOSIS — D50.0 IRON DEFICIENCY ANEMIA DUE TO CHRONIC BLOOD LOSS: Primary | ICD-10-CM

## 2022-03-29 DIAGNOSIS — K29.51 GASTROINTESTINAL HEMORRHAGE ASSOCIATED WITH CHRONIC GASTRITIS: ICD-10-CM

## 2022-03-29 DIAGNOSIS — D64.9 ANEMIA, UNSPECIFIED TYPE: ICD-10-CM

## 2022-03-29 DIAGNOSIS — I47.29 NSVT (NONSUSTAINED VENTRICULAR TACHYCARDIA): ICD-10-CM

## 2022-03-29 LAB
ANION GAP SERPL CALCULATED.3IONS-SCNC: 17 MMOL/L (ref 7–16)
ANISOCYTOSIS: ABNORMAL
BASOPHILS ABSOLUTE: 0.12 E9/L (ref 0–0.2)
BASOPHILS RELATIVE PERCENT: 2.6 % (ref 0–2)
BUN BLDV-MCNC: 21 MG/DL (ref 6–23)
BURR CELLS: ABNORMAL
CALCIUM SERPL-MCNC: 9.6 MG/DL (ref 8.6–10.2)
CHLORIDE BLD-SCNC: 107 MMOL/L (ref 98–107)
CO2: 21 MMOL/L (ref 22–29)
CREAT SERPL-MCNC: 1.1 MG/DL (ref 0.7–1.2)
EOSINOPHILS ABSOLUTE: 0.04 E9/L (ref 0.05–0.5)
EOSINOPHILS RELATIVE PERCENT: 0.9 % (ref 0–6)
GFR AFRICAN AMERICAN: >60
GFR NON-AFRICAN AMERICAN: >60 ML/MIN/1.73
GLUCOSE BLD-MCNC: 103 MG/DL (ref 74–99)
HCT VFR BLD CALC: 39.8 % (ref 37–54)
HEMOGLOBIN: 11 G/DL (ref 12.5–16.5)
HYPOCHROMIA: ABNORMAL
LYMPHOCYTES ABSOLUTE: 1.44 E9/L (ref 1.5–4)
LYMPHOCYTES RELATIVE PERCENT: 29.6 % (ref 20–42)
MCH RBC QN AUTO: 20.6 PG (ref 26–35)
MCHC RBC AUTO-ENTMCNC: 27.6 % (ref 32–34.5)
MCV RBC AUTO: 74.7 FL (ref 80–99.9)
MONOCYTES ABSOLUTE: 0.38 E9/L (ref 0.1–0.95)
MONOCYTES RELATIVE PERCENT: 7.8 % (ref 2–12)
NEUTROPHILS ABSOLUTE: 2.83 E9/L (ref 1.8–7.3)
NEUTROPHILS RELATIVE PERCENT: 59.1 % (ref 43–80)
OVALOCYTES: ABNORMAL
PDW BLD-RTO: 29 FL (ref 11.5–15)
PLATELET # BLD: 144 E9/L (ref 130–450)
PMV BLD AUTO: ABNORMAL FL (ref 7–12)
POIKILOCYTES: ABNORMAL
POLYCHROMASIA: ABNORMAL
POTASSIUM SERPL-SCNC: 5 MMOL/L (ref 3.5–5)
RBC # BLD: 5.33 E12/L (ref 3.8–5.8)
SCHISTOCYTES: ABNORMAL
SODIUM BLD-SCNC: 145 MMOL/L (ref 132–146)
TEAR DROP CELLS: ABNORMAL
WBC # BLD: 4.8 E9/L (ref 4.5–11.5)

## 2022-03-29 PROCEDURE — 1123F ACP DISCUSS/DSCN MKR DOCD: CPT | Performed by: INTERNAL MEDICINE

## 2022-03-29 PROCEDURE — G8417 CALC BMI ABV UP PARAM F/U: HCPCS | Performed by: INTERNAL MEDICINE

## 2022-03-29 PROCEDURE — 3017F COLORECTAL CA SCREEN DOC REV: CPT | Performed by: INTERNAL MEDICINE

## 2022-03-29 PROCEDURE — G8484 FLU IMMUNIZE NO ADMIN: HCPCS | Performed by: INTERNAL MEDICINE

## 2022-03-29 PROCEDURE — 99215 OFFICE O/P EST HI 40 MIN: CPT | Performed by: INTERNAL MEDICINE

## 2022-03-29 PROCEDURE — 4040F PNEUMOC VAC/ADMIN/RCVD: CPT | Performed by: INTERNAL MEDICINE

## 2022-03-29 PROCEDURE — 1036F TOBACCO NON-USER: CPT | Performed by: INTERNAL MEDICINE

## 2022-03-29 PROCEDURE — G8427 DOCREV CUR MEDS BY ELIG CLIN: HCPCS | Performed by: INTERNAL MEDICINE

## 2022-03-29 PROCEDURE — 93000 ELECTROCARDIOGRAM COMPLETE: CPT | Performed by: INTERNAL MEDICINE

## 2022-03-29 NOTE — TELEPHONE ENCOUNTER
Great  Stay on the eliquis then, but at any sign of bleeding or drop in hemoglobin he should stop it

## 2022-03-29 NOTE — PROGRESS NOTES
Chief Complaint   Patient presents with    Coronary Artery Disease    Irregular Heart Beat       Patient Active Problem List    Diagnosis Date Noted    NSVT (nonsustained ventricular tachycardia) ([facility] 03/29/2022     Overview Note:     A. ETT-N 3/28/2022: 5 METS, small anteroapical redistribting defect  B. TTE 2/25/2022: EF 50%. LVH      History of nuclear stress test 03/29/2022    Gastrointestinal hemorrhage associated with chronic gastritis 03/29/2022    Anemia 02/23/2022    Valvular heart disease 01/02/2020     Overview Note:     A. Echo 11/7/2019: mild to moderate MR, mild AI, EF 55%      Thoracic ascending aortic aneurysm ([facility] [address] 01/02/2020     Overview Note:     A. Echo 11/2019: 4.3 cm   B. CT scan 7/2020: 4.3 cm   C. CT 8/2021: 4.3 cm      Paroxysmal atrial fibrillation ([address] 05/19/2018     Overview Note:     A. CHADS-VASC = 3  (age, HTN, presumed CAD)  B .  DORINDA guided DC cardioversion 5/30/2018      Hypertension 07/23/2015    Hyperlipidemia 07/23/2015       Current Outpatient Medications   Medication Sig Dispense Refill    metoprolol tartrate (LOPRESSOR) 50 MG tablet Take 2 tablets by mouth in the morning and one tablet at night. 270 tablet 0    pantoprazole (PROTONIX) 40 MG tablet Take 1 tablet by mouth 2 times daily (before meals) 180 tablet 1    ferrous sulfate (IRON 325) 325 (65 Fe) MG tablet Take 1 tablet by mouth daily (with breakfast) 30 tablet 3    acetaminophen (TYLENOL 8 HOUR ARTHRITIS PAIN) 650 MG extended release tablet Take 650 mg by mouth 2 times daily as needed for Pain      atorvastatin (LIPITOR) 40 MG tablet Take 40 mg by mouth nightly      apixaban (ELIQUIS) 5 MG TABS tablet Take 5 mg by mouth 2 times daily      ramipril (ALTACE) 10 MG capsule Take 10 mg by mouth 2 times daily      cetirizine (ZYRTEC) 5 MG tablet Take 5 mg by mouth nightly        No current facility-administered medications for this visit.         Allergies   Allergen Reactions    Pcn [Penicillins] Itching, Swelling and Rash       Vitals:    22 0823   BP: (!) 152/80   Pulse: 65   Resp: 18   Weight: 205 lb 12.8 oz (93.4 kg)   Height: 5' 6\" (1.676 m)       Social History     Socioeconomic History    Marital status:      Spouse name: Not on file    Number of children: Not on file    Years of education: Not on file    Highest education level: Not on file   Occupational History     Employer: [employer]   Tobacco Use    Smoking status: Former Smoker     Packs/day: 0.25     Years: 0.50     Pack years: 0.12     Quit date: 1978     Years since quittin.8    Smokeless tobacco: Never Used   Vaping Use    Vaping Use: Never used   Substance and Sexual Activity    Alcohol use: Yes     Types: 3 Glasses of wine per week     Comment: occasionally    Drug use: No    Sexual activity: Not on file   Other Topics Concern    Not on file   Social History Narrative    Not on file     Social Determinants of Health     Financial Resource Strain: Low Risk     Difficulty of Paying Living Expenses: Not hard at all   Food Insecurity: No Food Insecurity    Worried About Running Out of Food in the Last Year: Never true    Tia of Food in the Last Year: Never true   Transportation Needs: No Transportation Needs    Lack of Transportation (Medical): No    Lack of Transportation (Non-Medical): No   Physical Activity: Inactive    Days of Exercise per Week: 0 days    Minutes of Exercise per Session: 0 min   Stress: No Stress Concern Present    Feeling of Stress : Not at all   Social Connections: Socially Integrated    Frequency of Communication with Friends and Family: More than three times a week    Frequency of Social Gatherings with Friends and Family: Twice a week    Attends [employer] Services: More than 4 times per year    Active Member of [address] [place] [address] [employer] or Organizations:  Yes    Attends Club or Organization Meetings: More than 4 times per year    Marital Status:    Intimate Partner Violence: Not At Risk    Fear of Current or Ex-Partner: No    Emotionally Abused: No    Physically Abused: No    Sexually Abused: No   Housing Stability: Unknown    Unable to Pay for Housing in the Last Year: No    Number of Places Lived in the Last Year: Not on file    Unstable Housing in the Last Year: No       Family History   Problem Relation Age of Onset    High Blood Pressure Father     Diabetes Brother          SUBJECTIVE: Demarcus Patterson presents to the office today for chronic cardiac diagnoses and hfu.  I reviewed notes of cardiology, GI. Has admitted with low hgb and EGD per dr Richard Tucker showed hemorrhagic gastritis. Eliquis was OK'd to resume. In hospital NSVT noted, as it had been on his OV prior, when   He had complained of an episode of profound weakness and sob while shoveling snow (although not accustomed to that activity)  Had stress test yesterday. Says he hasnt felt this well in months    Review of Systems:   Heart: as above   Lungs: as above   Eyes: denies changes in vision or discharge. Ears: denies changes in hearing or pain. Nose: denies epistaxis or masses   Throat: denies sore throat or trouble swallowing. Neuro: denies numbness, tingling, tremors. Skin: denies rashes or itching. : denies hematuria, dysuria   GI: denies vomiting, diarrhea   Psych: denies mood changed, anxiety, depression. all others negative. Physical Exam   BP (!) 152/80   Pulse 65   Resp 18   Ht 5' 6\" (1.676 m)   Wt 205 lb 12.8 oz (93.4 kg)   BMI 33.22 kg/m²   Constitutional: Oriented to person, place, and time. Well-developed and well-nourished. No distress. Head: Normocephalic and atraumatic. Eyes: EOM are normal. Pupils are equal, round, and reactive to light. Neck: Normal range of motion. Neck supple. No hepatojugular reflux and no JVD present. Carotid bruit is not present. No tracheal deviation present. No thyromegaly present.    Cardiovascular: Normal rate, regular rhythm, normal heart sounds and intact distal pulses. Exam reveals no gallop and no friction rub. Soft blowing murmur AI murmur heard in sitting position . Grade II/ plateau murmur of MR heard at apex with radiation to axilla, best appreciated in LLD positoinn  Pulmonary/Chest: Effort normal and breath sounds normal. No respiratory distress. No wheezes. No rales. No tenderness. Abdominal: Soft. Bowel sounds are normal. No distension and no mass. No tenderness. No rebound and no guarding. Musculoskeletal: Normal range of motion. No edema and no tenderness. Neurological: Alert and oriented to person, place, and time. Skin: Skin is warm and dry. No rash noted. Not diaphoretic. No erythema. Psychiatric: Normal mood and affect. Behavior is normal.     EKG:  NSR, left axis deviation    ASSESSMENT AND PLAN:  Patient Active Problem List   Diagnosis    Hypertension:      Hyperlipidemia: on high intensity statin .  Paroxysmal atrial fibrillation : DC cardioversion successful 2018  .   *    *  *  * Valve disease, Stage B:  Mild moderate MR on echo 3/2022  Ascending thoracic aneurysm:  4.3 cm by CT scan 2021, stable three years running. GI blood loss - EGD documented hemorrhagic gastritis 3/2022  CAD: ETT-N 3/28/2022: mild anteroapical redistributing defect  NSVT     See above. Patient with JUAREZ that is multifactorial, with large component severe anemia due to GI blood loss, compounded by [employer] usage.   Also with likely CAD given stress results above  Will avoid cath at this time given no angina, unresolved anemia to date  For CBC today, if hgb still down would consider stopping [employer] (last AF episode 4 years ago), as may represent more harm than good at this time      OV 2 months      Sen Mitchell M.D  [facility]

## 2022-04-07 RX ORDER — ATORVASTATIN CALCIUM 40 MG/1
TABLET, FILM COATED ORAL
Qty: 90 TABLET | Refills: 1 | Status: SHIPPED
Start: 2022-04-07 | End: 2022-09-06

## 2022-04-27 DIAGNOSIS — D64.9 ANEMIA, UNSPECIFIED TYPE: ICD-10-CM

## 2022-04-27 LAB
ACANTHOCYTES: ABNORMAL
ANISOCYTOSIS: ABNORMAL
ATYPICAL LYMPHOCYTE RELATIVE PERCENT: 2.6 % (ref 0–4)
BASOPHILS ABSOLUTE: 0.1 E9/L (ref 0–0.2)
BASOPHILS RELATIVE PERCENT: 1.8 % (ref 0–2)
BURR CELLS: ABNORMAL
EOSINOPHILS ABSOLUTE: 0 E9/L (ref 0.05–0.5)
EOSINOPHILS RELATIVE PERCENT: 1.3 % (ref 0–6)
HCT VFR BLD CALC: 41.4 % (ref 37–54)
HEMOGLOBIN: 12.3 G/DL (ref 12.5–16.5)
LYMPHOCYTES ABSOLUTE: 1.57 E9/L (ref 1.5–4)
LYMPHOCYTES RELATIVE PERCENT: 26.3 % (ref 20–42)
MCH RBC QN AUTO: 23.2 PG (ref 26–35)
MCHC RBC AUTO-ENTMCNC: 29.7 % (ref 32–34.5)
MCV RBC AUTO: 78.1 FL (ref 80–99.9)
METAMYELOCYTES RELATIVE PERCENT: 1.8 % (ref 0–1)
MONOCYTES ABSOLUTE: 0.43 E9/L (ref 0.1–0.95)
MONOCYTES RELATIVE PERCENT: 7.9 % (ref 2–12)
NEUTROPHILS ABSOLUTE: 3.29 E9/L (ref 1.8–7.3)
NEUTROPHILS RELATIVE PERCENT: 59.6 % (ref 43–80)
OVALOCYTES: ABNORMAL
PDW BLD-RTO: 26.3 FL (ref 11.5–15)
PLATELET # BLD: 143 E9/L (ref 130–450)
PMV BLD AUTO: ABNORMAL FL (ref 7–12)
POIKILOCYTES: ABNORMAL
POLYCHROMASIA: ABNORMAL
RBC # BLD: 5.3 E12/L (ref 3.8–5.8)
SCHISTOCYTES: ABNORMAL
TARGET CELLS: ABNORMAL
TEAR DROP CELLS: ABNORMAL
VACUOLATED NEUTROPHILS: ABNORMAL
WBC # BLD: 5.4 E9/L (ref 4.5–11.5)

## 2022-04-28 DIAGNOSIS — D64.9 ANEMIA, UNSPECIFIED TYPE: Primary | ICD-10-CM

## 2022-05-23 DIAGNOSIS — D64.9 ANEMIA, UNSPECIFIED TYPE: ICD-10-CM

## 2022-05-24 LAB
BASOPHILS ABSOLUTE: 0.04 E9/L (ref 0–0.2)
BASOPHILS RELATIVE PERCENT: 0.7 % (ref 0–2)
EOSINOPHILS ABSOLUTE: 0.06 E9/L (ref 0.05–0.5)
EOSINOPHILS RELATIVE PERCENT: 1 % (ref 0–6)
HCT VFR BLD CALC: 42 % (ref 37–54)
HEMOGLOBIN: 13.1 G/DL (ref 12.5–16.5)
IMMATURE GRANULOCYTES #: 0.01 E9/L
IMMATURE GRANULOCYTES %: 0.2 % (ref 0–5)
LYMPHOCYTES ABSOLUTE: 1.45 E9/L (ref 1.5–4)
LYMPHOCYTES RELATIVE PERCENT: 24.2 % (ref 20–42)
MCH RBC QN AUTO: 24.5 PG (ref 26–35)
MCHC RBC AUTO-ENTMCNC: 31.2 % (ref 32–34.5)
MCV RBC AUTO: 78.7 FL (ref 80–99.9)
MONOCYTES ABSOLUTE: 0.52 E9/L (ref 0.1–0.95)
MONOCYTES RELATIVE PERCENT: 8.7 % (ref 2–12)
NEUTROPHILS ABSOLUTE: 3.91 E9/L (ref 1.8–7.3)
NEUTROPHILS RELATIVE PERCENT: 65.2 % (ref 43–80)
PDW BLD-RTO: 21.9 FL (ref 11.5–15)
PLATELET # BLD: 154 E9/L (ref 130–450)
PMV BLD AUTO: ABNORMAL FL (ref 7–12)
RBC # BLD: 5.34 E12/L (ref 3.8–5.8)
WBC # BLD: 6 E9/L (ref 4.5–11.5)

## 2022-05-26 ENCOUNTER — OFFICE VISIT (OUTPATIENT)
Dept: CARDIOLOGY CLINIC | Age: 72
End: 2022-05-26
Payer: MEDICARE

## 2022-05-26 VITALS
BODY MASS INDEX: 32.62 KG/M2 | HEART RATE: 69 BPM | SYSTOLIC BLOOD PRESSURE: 162 MMHG | WEIGHT: 203 LBS | HEIGHT: 66 IN | RESPIRATION RATE: 16 BRPM | DIASTOLIC BLOOD PRESSURE: 91 MMHG

## 2022-05-26 DIAGNOSIS — I48.0 PAROXYSMAL ATRIAL FIBRILLATION (HCC): Primary | ICD-10-CM

## 2022-05-26 PROBLEM — Z92.89 HISTORY OF NUCLEAR STRESS TEST: Status: RESOLVED | Noted: 2022-03-29 | Resolved: 2022-05-26

## 2022-05-26 PROCEDURE — 1036F TOBACCO NON-USER: CPT | Performed by: INTERNAL MEDICINE

## 2022-05-26 PROCEDURE — 99214 OFFICE O/P EST MOD 30 MIN: CPT | Performed by: INTERNAL MEDICINE

## 2022-05-26 PROCEDURE — G8417 CALC BMI ABV UP PARAM F/U: HCPCS | Performed by: INTERNAL MEDICINE

## 2022-05-26 PROCEDURE — G8427 DOCREV CUR MEDS BY ELIG CLIN: HCPCS | Performed by: INTERNAL MEDICINE

## 2022-05-26 PROCEDURE — 1123F ACP DISCUSS/DSCN MKR DOCD: CPT | Performed by: INTERNAL MEDICINE

## 2022-05-26 PROCEDURE — 93000 ELECTROCARDIOGRAM COMPLETE: CPT | Performed by: INTERNAL MEDICINE

## 2022-05-26 PROCEDURE — 3017F COLORECTAL CA SCREEN DOC REV: CPT | Performed by: INTERNAL MEDICINE

## 2022-05-26 NOTE — PROGRESS NOTES
Chief Complaint   Patient presents with    Atrial Fibrillation       Patient Active Problem List    Diagnosis Date Noted    NSVT (nonsustained ventricular tachycardia) ([facility] [address] 03/29/2022     Overview Note:     A. ETT-N 3/28/2022: 5 METS, small anteroapical redistribting defect  B. TTE 2/25/2022: EF 50%. LVH      Gastrointestinal hemorrhage associated with chronic gastritis 03/29/2022    Anemia 02/23/2022    Valvular heart disease 01/02/2020     Overview Note:     A. Echo 11/7/2019: mild to moderate MR, mild AI, EF 55%      Thoracic ascending aortic aneurysm ([address] 01/02/2020     Overview Note:     A. Echo 11/2019: 4.3 cm   B. CT scan 7/2020: 4.3 cm   C. CT 8/2021: 4.3 cm      Paroxysmal atrial fibrillation ([facility] [address] 05/19/2018     Overview Note:     A. CHADS-VASC = 3  (age, HTN, presumed CAD)  B .  DORINDA guided DC cardioversion 5/30/2018      Hypertension 07/23/2015    Hyperlipidemia 07/23/2015       Current Outpatient Medications   Medication Sig Dispense Refill    atorvastatin (LIPITOR) 40 MG tablet TAKE 1 TABLET BY MOUTH  DAILY 90 tablet 1    metoprolol tartrate (LOPRESSOR) 50 MG tablet Take 2 tablets by mouth in the morning and one tablet at night. 270 tablet 0    pantoprazole (PROTONIX) 40 MG tablet Take 1 tablet by mouth 2 times daily (before meals) 180 tablet 1    ferrous sulfate (IRON 325) 325 (65 Fe) MG tablet Take 1 tablet by mouth daily (with breakfast) (Patient taking differently: Take 325 mg by mouth three times a week ) 30 tablet 3    acetaminophen (TYLENOL 8 HOUR ARTHRITIS PAIN) 650 MG extended release tablet Take 650 mg by mouth 2 times daily as needed for Pain      apixaban (ELIQUIS) 5 MG TABS tablet Take 5 mg by mouth 2 times daily      ramipril (ALTACE) 10 MG capsule Take 10 mg by mouth 2 times daily      cetirizine (ZYRTEC) 5 MG tablet Take 5 mg by mouth nightly        No current facility-administered medications for this visit.         Allergies   Allergen Reactions    Pcn [Penicillins] Itching, Swelling and Rash       Vitals:    22 0823 22 0827   BP: (!) 149/93 (!) 162/91   Pulse: 69    Resp: 16    Weight: 203 lb (92.1 kg)    Height: 5' 6\" (1.676 m)        Social History     Socioeconomic History    Marital status:      Spouse name: Not on file    Number of children: Not on file    Years of education: Not on file    Highest education level: Not on file   Occupational History     Employer: [employer]   Tobacco Use    Smoking status: Former Smoker     Packs/day: 0.25     Years: 0.50     Pack years: 0.12     Quit date: 1978     Years since quittin.0    Smokeless tobacco: Never Used   Vaping Use    Vaping Use: Never used   Substance and Sexual Activity    Alcohol use: Yes     Types: 3 Glasses of wine per week     Comment: occasionally    Drug use: No    Sexual activity: Not on file   Other Topics Concern    Not on file   Social History Narrative    Not on file     Social Determinants of Health     Financial Resource Strain: Low Risk     Difficulty of Paying Living Expenses: Not hard at all   Food Insecurity: No Food Insecurity    Worried About Running Out of Food in the Last Year: Never true    Tia of Food in the Last Year: Never true   Transportation Needs: No Transportation Needs    Lack of Transportation (Medical): No    Lack of Transportation (Non-Medical): No   Physical Activity: Inactive    Days of Exercise per Week: 0 days    Minutes of Exercise per Session: 0 min   Stress: No Stress Concern Present    Feeling of Stress : Not at all   Social Connections: Socially Integrated    Frequency of Communication with Friends and Family: More than three times a week    Frequency of Social Gatherings with Friends and Family: Twice a week    Attends [employer] Services: More than 4 times per year    Active Member of [address] [place] [address] [employer] or Organizations:  Yes    Attends Club or Organization Meetings: More than 4 times per year    Marital Status:    Intimate Partner Violence: Not At Risk    Fear of Current or Ex-Partner: No    Emotionally Abused: No    Physically Abused: No    Sexually Abused: No   Housing Stability: Unknown    Unable to Pay for Housing in the Last Year: No    Number of Places Lived in the Last Year: Not on file    Unstable Housing in the Last Year: No       Family History   Problem Relation Age of Onset    High Blood Pressure Father     Diabetes Brother          SUBJECTIVE: Ira Charles presents to the office today for chronic cardiac diagnoses . NO MORE episodes of hemorrhagic gastritis. Eliquis was OK'd to resume. Says he hasnt felt this well in months. Walking the golf course, doing all chores. No palps    Review of Systems:   Heart: as above   Lungs: as above   Eyes: denies changes in vision or discharge. Ears: denies changes in hearing or pain. Nose: denies epistaxis or masses   Throat: denies sore throat or trouble swallowing. Neuro: denies numbness, tingling, tremors. Skin: denies rashes or itching. : denies hematuria, dysuria   GI: denies vomiting, diarrhea   Psych: denies mood changed, anxiety, depression. all others negative. Physical Exam   BP (!) 162/91   Pulse 69   Resp 16   Ht 5' 6\" (1.676 m)   Wt 203 lb (92.1 kg)   BMI 32.77 kg/m²   Constitutional: Oriented to person, place, and time. Well-developed and well-nourished. No distress. Head: Normocephalic and atraumatic. Eyes: EOM are normal. Pupils are equal, round, and reactive to light. Neck: Normal range of motion. Neck supple. No hepatojugular reflux and no JVD present. Carotid bruit is not present. No tracheal deviation present. No thyromegaly present. Cardiovascular: Normal rate, regular rhythm, normal heart sounds and intact distal pulses. Exam reveals no gallop and no friction rub. Soft blowing murmur AI murmur heard in sitting position .   Grade II/ plateau murmur of MR heard at apex with radiation to axilla, best appreciated in LLD positoinn  Pulmonary/Chest: Effort normal and breath sounds normal. No respiratory distress. No wheezes. No rales. No tenderness. Abdominal: Soft. Bowel sounds are normal. No distension and no mass. No tenderness. No rebound and no guarding. Musculoskeletal: Normal range of motion. No edema and no tenderness. Neurological: Alert and oriented to person, place, and time. Skin: Skin is warm and dry. No rash noted. Not diaphoretic. No erythema. Psychiatric: Normal mood and affect. Behavior is normal.     EKG:  NSR, left axis deviation    ASSESSMENT AND PLAN:  Patient Active Problem List   Diagnosis    Hypertension:      Hyperlipidemia: on high intensity statin . LDL 59 !  Paroxysmal atrial fibrillation : DC cardioversion successful 2018  .   *    *  *  * Valve disease, Stage B:  Mild moderate MR on echo 3/2022  Ascending thoracic aneurysm:  4.3 cm by CT scan 2021, stable three years running. GI blood loss - EGD documented hemorrhagic gastritis 3/2022  CAD: ETT-N 3/28/2022: mild anteroapical redistributing defect  NSVT       Patient with JUAREZ (resolved)  that is multifactorial, with large component severe anemia due to GI blood loss, compounded by [facility] usage.   Also with likely CAD given stress results above stress results  Excellent functional capacity at this point  If Hgb still drops would stop [facility] (last AF episode 4 years ago), as may represent more harm than good at this time      OV 6 months      Ozzy Melendez M.D  [facility]

## 2022-08-11 RX ORDER — PANTOPRAZOLE SODIUM 40 MG/1
TABLET, DELAYED RELEASE ORAL
Qty: 180 TABLET | Refills: 3 | Status: SHIPPED | OUTPATIENT
Start: 2022-08-11

## 2022-09-06 RX ORDER — ATORVASTATIN CALCIUM 40 MG/1
TABLET, FILM COATED ORAL
Qty: 90 TABLET | Refills: 3 | Status: SHIPPED | OUTPATIENT
Start: 2022-09-06

## 2022-09-08 RX ORDER — METOPROLOL TARTRATE 50 MG/1
TABLET, FILM COATED ORAL
Qty: 270 TABLET | Refills: 0 | Status: SHIPPED | OUTPATIENT
Start: 2022-09-08

## 2022-09-14 ENCOUNTER — TELEPHONE (OUTPATIENT)
Dept: PRIMARY CARE CLINIC | Age: 72
End: 2022-09-14

## 2022-09-14 DIAGNOSIS — D64.9 ANEMIA, UNSPECIFIED TYPE: Primary | ICD-10-CM

## 2022-09-14 DIAGNOSIS — I48.0 PAROXYSMAL ATRIAL FIBRILLATION (HCC): ICD-10-CM

## 2022-11-09 RX ORDER — METOPROLOL TARTRATE 50 MG/1
TABLET, FILM COATED ORAL
Qty: 270 TABLET | Refills: 3 | Status: SHIPPED | OUTPATIENT
Start: 2022-11-09

## 2022-11-28 ENCOUNTER — OFFICE VISIT (OUTPATIENT)
Dept: CARDIOLOGY CLINIC | Age: 72
End: 2022-11-28
Payer: MEDICARE

## 2022-11-28 VITALS
HEIGHT: 66 IN | DIASTOLIC BLOOD PRESSURE: 65 MMHG | BODY MASS INDEX: 32.59 KG/M2 | RESPIRATION RATE: 16 BRPM | SYSTOLIC BLOOD PRESSURE: 130 MMHG | HEART RATE: 60 BPM | WEIGHT: 202.8 LBS

## 2022-11-28 DIAGNOSIS — I71.21 ANEURYSM OF ASCENDING AORTA WITHOUT RUPTURE: ICD-10-CM

## 2022-11-28 DIAGNOSIS — I48.0 PAROXYSMAL ATRIAL FIBRILLATION (HCC): Primary | ICD-10-CM

## 2022-11-28 DIAGNOSIS — I10 HYPERTENSION, UNSPECIFIED TYPE: ICD-10-CM

## 2022-11-28 PROCEDURE — G8427 DOCREV CUR MEDS BY ELIG CLIN: HCPCS | Performed by: INTERNAL MEDICINE

## 2022-11-28 PROCEDURE — 1036F TOBACCO NON-USER: CPT | Performed by: INTERNAL MEDICINE

## 2022-11-28 PROCEDURE — 99214 OFFICE O/P EST MOD 30 MIN: CPT | Performed by: INTERNAL MEDICINE

## 2022-11-28 PROCEDURE — 93000 ELECTROCARDIOGRAM COMPLETE: CPT | Performed by: INTERNAL MEDICINE

## 2022-11-28 PROCEDURE — G8484 FLU IMMUNIZE NO ADMIN: HCPCS | Performed by: INTERNAL MEDICINE

## 2022-11-28 PROCEDURE — 3074F SYST BP LT 130 MM HG: CPT | Performed by: INTERNAL MEDICINE

## 2022-11-28 PROCEDURE — 1123F ACP DISCUSS/DSCN MKR DOCD: CPT | Performed by: INTERNAL MEDICINE

## 2022-11-28 PROCEDURE — 3078F DIAST BP <80 MM HG: CPT | Performed by: INTERNAL MEDICINE

## 2022-11-28 PROCEDURE — G8417 CALC BMI ABV UP PARAM F/U: HCPCS | Performed by: INTERNAL MEDICINE

## 2022-11-28 PROCEDURE — 3017F COLORECTAL CA SCREEN DOC REV: CPT | Performed by: INTERNAL MEDICINE

## 2022-11-28 NOTE — PROGRESS NOTES
Chief Complaint   Patient presents with    Atrial Fibrillation       Patient Active Problem List    Diagnosis Date Noted    NSVT (nonsustained ventricular tachycardia) 03/29/2022     Overview Note:     A. ETT-N 3/28/2022: 5 METS, small anteroapical redistribting defect  B. TTE 2/25/2022: EF 50%. LVH      Gastrointestinal hemorrhage associated with chronic gastritis 03/29/2022    Anemia 02/23/2022    Valvular heart disease 01/02/2020     Overview Note:     A. Echo 11/7/2019: mild to moderate MR, mild AI, EF 55%      Thoracic ascending aortic aneurysm 01/02/2020     Overview Note:     A. Echo 11/2019: 4.3 cm   B. CT scan 7/2020: 4.3 cm   C. CT 8/2021: 4.3 cm      Paroxysmal atrial fibrillation ([address] 05/19/2018     Overview Note:     A. CHADS-VASC = 3  (age, HTN, presumed CAD)  B .  DORINDA guided DC cardioversion 5/30/2018      Hypertension 07/23/2015    Hyperlipidemia 07/23/2015       Current Outpatient Medications   Medication Sig Dispense Refill    metoprolol tartrate (LOPRESSOR) 50 MG tablet TAKE 2 TABLETS BY MOUTH IN  THE MORNING AND 1 TABLET BY MOUTH AT NIGHT 270 tablet 3    atorvastatin (LIPITOR) 40 MG tablet TAKE 1 TABLET BY MOUTH  DAILY 90 tablet 3    pantoprazole (PROTONIX) 40 MG tablet TAKE 1 TABLET BY MOUTH  TWICE DAILY BEFORE MEALS 180 tablet 3    acetaminophen (TYLENOL) 650 MG extended release tablet Take 650 mg by mouth 2 times daily as needed for Pain      apixaban (ELIQUIS) 5 MG TABS tablet Take 5 mg by mouth 2 times daily      ramipril (ALTACE) 10 MG capsule Take 10 mg by mouth 2 times daily      cetirizine (ZYRTEC) 5 MG tablet Take 5 mg by mouth nightly        No current facility-administered medications for this visit.         Allergies   Allergen Reactions    Pcn [Penicillins] Itching, Swelling and Rash       Vitals:    11/28/22 0833   BP: 130/65   Pulse: 60   Resp: 16   Weight: 202 lb 12.8 oz (92 kg)   Height: 5' 6\" (1.676 m)               SUBJECTIVE: Marilee Chu presents to the office today for chronic cardiac diagnoses . NO MORE episodes of hemorrhagic gastritis. On Eliquis with no bleeds. Walking the golf course, doing all chores. No palps        Physical Exam   /65   Pulse 60   Resp 16   Ht 5' 6\" (1.676 m)   Wt 202 lb 12.8 oz (92 kg)   BMI 32.73 kg/m²   Constitutional: Oriented to person, place, and time. Well-developed and well-nourished. No distress. Neck: No JVD present. Carotid bruit is not present. Cardiovascular: Normal rate, regular rhythm, normal heart sounds and intact distal pulses. Exam reveals no gallop and no friction rub. Soft blowing murmur AI murmur heard in sitting position . Grade II/ plateau murmur of MR heard at apex with radiation to axilla, best appreciated in LLD positoinn  Pulmonary/Chest: Effort normal and breath sounds normal. No respiratory distress. No wheezes. No rales. Abdominal: Soft. Bowel sounds are normal. No distension and no mass. Musculoskeletal: Normal range of motion. No edema   Neurological: Alert and oriented to person, place, and time. Skin: Skin is warm and dry. No rash noted. Not diaphoretic. No erythema. Psychiatric: Normal mood and affect. Behavior is normal.     EKG:  NSR, left axis deviation, no change    ASSESSMENT AND PLAN:  Patient Active Problem List   Diagnosis    Hypertension:      Hyperlipidemia: on high intensity statin . LDL 59 ! Paroxysmal atrial fibrillation : DC cardioversion successful 2018  . On NOAC     *      *  *  * Valve disease, Stage B:  Mild moderate MR on echo 3/2022  Ascending thoracic aneurysm:  4.3 cm by CT scan 2021, stable three years running, will defer this year. GI blood loss - EGD documented hemorrhagic gastritis 3/2022  CAD: ETT-N 3/28/2022: mild anteroapical redistributing defect  NSVT     See above  Patient with JUAREZ (resolved)  that is multifactorial, with large component severe anemia due to GI blood loss, compounded by [address] usage.   Also with likely CAD given stress results above stress results  Excellent functional capacity at this point  If Hgb still drops would stop [facility] (last AF episode 4 years ago), as may represent more harm than good at this time      OV 1 year      Danilo Ramirez M.D  [facility]

## 2022-12-06 RX ORDER — RAMIPRIL 10 MG/1
CAPSULE ORAL
Qty: 180 CAPSULE | Refills: 3 | Status: SHIPPED | OUTPATIENT
Start: 2022-12-06

## 2022-12-06 RX ORDER — APIXABAN 5 MG/1
TABLET, FILM COATED ORAL
Qty: 180 TABLET | Refills: 3 | Status: SHIPPED | OUTPATIENT
Start: 2022-12-06

## 2022-12-20 ENCOUNTER — TELEPHONE (OUTPATIENT)
Dept: PHARMACY | Facility: CLINIC | Age: 72
End: 2022-12-20

## 2022-12-20 NOTE — TELEPHONE ENCOUNTER
[facility] CLINICAL PHARMACY: ADHERENCE REVIEW  Identified care gap per [facility]: fills at [facility]: ACE/ARB and Statin adherence    Last Visit: 11/28/22    ASSESSMENT  ACE/ARB ADHERENCE    Fail date 04/26/23  RAMIPRIL CAP 10MG last filled on 10/05/22 for 90 day supply. Next refill due: 1/03/23    Per [facility]:   RAMIPRIL CAP 10MG last picked up on 10/06/22 for 90 day supply. 4 refills remaining. Billed through [facility]     BP Readings from Last 3 Encounters:   11/28/22 130/65   05/26/22 (!) 162/91   03/29/22 (!) 152/80     Estimated Creatinine Clearance: 57 mL/min (based on SCr of 1.2 mg/dL). STATIN ADHERENCE    Fail date  ATORVASTATIN TAB 40MG last filled on 07/06/22 for 90 day supply. Next refill due: 10/04/22    [facility]:   ATORVASTATIN TAB 40MG last picked up on 07/07/22 for 90 day supply. 3 refills remaining. Billed through [address] [place] [address] will process a refill     Lab Results   Component Value Date    CHOL 123 02/22/2022    TRIG 59 02/22/2022    HDL 52 02/22/2022    LDLCALC 59 02/22/2022     ALT   Date Value Ref Range Status   09/29/2022 21 0 - 40 U/L Final     AST   Date Value Ref Range Status   09/29/2022 21 0 - 39 U/L Final     The ASCVD Risk score (Saba DK, et al., 2019) failed to calculate for the following reasons: The valid total cholesterol range is 130 to 320 mg/dL     PLAN  Attempting to reach patient to review. Left message asking for return call.     Called patient to discuss adherence for Atorvastatin     Mailed letter    Future Appointments   Date Time Provider Josh Donnelly   11/27/2023  8:30 AM MD Lico Hinds Doris  // Department, toll free [phone], Option 2      For Pharmacy Admin Tracking Only    Program: [address]:  No  Recommendation Provided To: Pharmacy: 1  Intervention Detail: Adherence Monitorin  Gap Closed?: No   Intervention Accepted By: Pharmacy: 1  Time Spent (min): 10

## 2022-12-20 NOTE — LETTER
[place]  [address], [address]        [address]   [address] [facility]           12/20/22     Dear [address],    We tried to reach you recently regarding your ATORVASTATIN TAB 40MG, but were unable to reach you on the telephone. We have on file that you are currently taking ATORVASTATIN TAB 40MG. If you are no longer taking or taking differently, please call us at the number below so that we can discuss this and update your medication profile. It appears that this medication has not been filled at proper times. We are worried you might be missing doses or not taking it as directed. It is important that you take your medications regularly and try not to miss a single dose.     Some ways to help you remember to take and refill your medications are to:  · Use a pill box, set an alarm, and/or keep your medication near something that you do every day  · Ask your pharmacy if they participate in [facility]", a program where you can  all of your medications on the same day  · Ask your pharmacy if you can be set up with automatic refill, where they will automatically refill your prescription when it is due and let you know it's ready to     Sincerely,   [address]  // Department, toll free [phone], Option 2

## 2023-05-10 ENCOUNTER — TELEPHONE (OUTPATIENT)
Dept: PRIMARY CARE CLINIC | Age: 73
End: 2023-05-10

## 2023-05-10 DIAGNOSIS — I48.0 PAROXYSMAL ATRIAL FIBRILLATION (HCC): ICD-10-CM

## 2023-05-10 DIAGNOSIS — I10 ESSENTIAL HYPERTENSION: ICD-10-CM

## 2023-05-10 DIAGNOSIS — D64.9 ANEMIA, UNSPECIFIED TYPE: Primary | ICD-10-CM

## 2023-05-10 DIAGNOSIS — E78.5 HYPERLIPIDEMIA, UNSPECIFIED HYPERLIPIDEMIA TYPE: ICD-10-CM

## 2023-05-10 DIAGNOSIS — Z12.5 PROSTATE CANCER SCREENING: ICD-10-CM

## 2023-05-10 SDOH — HEALTH STABILITY: PHYSICAL HEALTH: ON AVERAGE, HOW MANY MINUTES DO YOU ENGAGE IN EXERCISE AT THIS LEVEL?: 10 MIN

## 2023-05-10 SDOH — HEALTH STABILITY: PHYSICAL HEALTH: ON AVERAGE, HOW MANY DAYS PER WEEK DO YOU ENGAGE IN MODERATE TO STRENUOUS EXERCISE (LIKE A BRISK WALK)?: 3 DAYS

## 2023-05-10 ASSESSMENT — PATIENT HEALTH QUESTIONNAIRE - PHQ9
SUM OF ALL RESPONSES TO PHQ QUESTIONS 1-9: 0
2. FEELING DOWN, DEPRESSED OR HOPELESS: 0
SUM OF ALL RESPONSES TO PHQ9 QUESTIONS 1 & 2: 0
SUM OF ALL RESPONSES TO PHQ QUESTIONS 1-9: 0
1. LITTLE INTEREST OR PLEASURE IN DOING THINGS: 0

## 2023-05-10 ASSESSMENT — LIFESTYLE VARIABLES
HOW OFTEN DO YOU HAVE A DRINK CONTAINING ALCOHOL: MONTHLY OR LESS
HOW MANY STANDARD DRINKS CONTAINING ALCOHOL DO YOU HAVE ON A TYPICAL DAY: 1
HOW OFTEN DO YOU HAVE SIX OR MORE DRINKS ON ONE OCCASION: 1
HOW OFTEN DO YOU HAVE A DRINK CONTAINING ALCOHOL: 2
HOW MANY STANDARD DRINKS CONTAINING ALCOHOL DO YOU HAVE ON A TYPICAL DAY: 1 OR 2

## 2023-05-11 DIAGNOSIS — E78.5 HYPERLIPIDEMIA, UNSPECIFIED HYPERLIPIDEMIA TYPE: ICD-10-CM

## 2023-05-11 DIAGNOSIS — I10 ESSENTIAL HYPERTENSION: ICD-10-CM

## 2023-05-11 DIAGNOSIS — I48.0 PAROXYSMAL ATRIAL FIBRILLATION (HCC): ICD-10-CM

## 2023-05-11 DIAGNOSIS — Z12.5 PROSTATE CANCER SCREENING: ICD-10-CM

## 2023-05-11 DIAGNOSIS — D64.9 ANEMIA, UNSPECIFIED TYPE: ICD-10-CM

## 2023-05-11 LAB
ALBUMIN SERPL-MCNC: 4.1 G/DL (ref 3.5–5.2)
ALP SERPL-CCNC: 53 U/L (ref 40–129)
ALT SERPL-CCNC: 20 U/L (ref 0–40)
ANION GAP SERPL CALCULATED.3IONS-SCNC: 9 MMOL/L (ref 7–16)
AST SERPL-CCNC: 22 U/L (ref 0–39)
BASOPHILS # BLD: 0.04 E9/L (ref 0–0.2)
BASOPHILS NFR BLD: 0.8 % (ref 0–2)
BILIRUB SERPL-MCNC: 1.6 MG/DL (ref 0–1.2)
BUN SERPL-MCNC: 20 MG/DL (ref 6–23)
CALCIUM SERPL-MCNC: 9.3 MG/DL (ref 8.6–10.2)
CHLORIDE SERPL-SCNC: 104 MMOL/L (ref 98–107)
CHOLESTEROL, TOTAL: 158 MG/DL (ref 0–199)
CO2 SERPL-SCNC: 27 MMOL/L (ref 22–29)
CREAT SERPL-MCNC: 1.1 MG/DL (ref 0.7–1.2)
EOSINOPHIL # BLD: 0.1 E9/L (ref 0.05–0.5)
EOSINOPHIL NFR BLD: 1.9 % (ref 0–6)
ERYTHROCYTE [DISTWIDTH] IN BLOOD BY AUTOMATED COUNT: 14 FL (ref 11.5–15)
GLUCOSE SERPL-MCNC: 106 MG/DL (ref 74–99)
HCT VFR BLD AUTO: 42.6 % (ref 37–54)
HDLC SERPL-MCNC: 53 MG/DL
HGB BLD-MCNC: 13.2 G/DL (ref 12.5–16.5)
IMM GRANULOCYTES # BLD: 0.01 E9/L
IMM GRANULOCYTES NFR BLD: 0.2 % (ref 0–5)
LDLC SERPL CALC-MCNC: 92 MG/DL (ref 0–99)
LYMPHOCYTES # BLD: 1.48 E9/L (ref 1.5–4)
LYMPHOCYTES NFR BLD: 28.1 % (ref 20–42)
MCH RBC QN AUTO: 26.6 PG (ref 26–35)
MCHC RBC AUTO-ENTMCNC: 31 % (ref 32–34.5)
MCV RBC AUTO: 85.9 FL (ref 80–99.9)
MONOCYTES # BLD: 0.57 E9/L (ref 0.1–0.95)
MONOCYTES NFR BLD: 10.8 % (ref 2–12)
NEUTROPHILS # BLD: 3.07 E9/L (ref 1.8–7.3)
NEUTS SEG NFR BLD: 58.2 % (ref 43–80)
PLATELET # BLD AUTO: 144 E9/L (ref 130–450)
PMV BLD AUTO: 11.3 FL (ref 7–12)
POTASSIUM SERPL-SCNC: 4.4 MMOL/L (ref 3.5–5)
PROT SERPL-MCNC: 7.1 G/DL (ref 6.4–8.3)
PSA SERPL-MCNC: 1.92 NG/ML (ref 0–4)
RBC # BLD AUTO: 4.96 E12/L (ref 3.8–5.8)
SODIUM SERPL-SCNC: 140 MMOL/L (ref 132–146)
TRIGL SERPL-MCNC: 67 MG/DL (ref 0–149)
TSH SERPL-MCNC: 2.28 UIU/ML (ref 0.27–4.2)
VLDLC SERPL CALC-MCNC: 13 MG/DL
WBC # BLD: 5.3 E9/L (ref 4.5–11.5)

## 2023-05-16 ENCOUNTER — OFFICE VISIT (OUTPATIENT)
Dept: PRIMARY CARE CLINIC | Age: 73
End: 2023-05-16

## 2023-05-16 VITALS
OXYGEN SATURATION: 98 % | DIASTOLIC BLOOD PRESSURE: 86 MMHG | WEIGHT: 205 LBS | RESPIRATION RATE: 18 BRPM | HEIGHT: 66 IN | BODY MASS INDEX: 32.95 KG/M2 | SYSTOLIC BLOOD PRESSURE: 138 MMHG | TEMPERATURE: 98.5 F | HEART RATE: 69 BPM

## 2023-05-16 DIAGNOSIS — B36.0 TINEA VERSICOLOR: ICD-10-CM

## 2023-05-16 DIAGNOSIS — D69.6 THROMBOCYTOPENIA, UNSPECIFIED (HCC): ICD-10-CM

## 2023-05-16 DIAGNOSIS — I48.0 PAROXYSMAL ATRIAL FIBRILLATION (HCC): ICD-10-CM

## 2023-05-16 DIAGNOSIS — I10 ESSENTIAL HYPERTENSION: ICD-10-CM

## 2023-05-16 DIAGNOSIS — Z00.00 MEDICARE ANNUAL WELLNESS VISIT, SUBSEQUENT: Primary | ICD-10-CM

## 2023-05-16 DIAGNOSIS — I71.21 ANEURYSM OF ASCENDING AORTA WITHOUT RUPTURE (HCC): ICD-10-CM

## 2023-05-16 DIAGNOSIS — D64.9 ANEMIA, UNSPECIFIED TYPE: ICD-10-CM

## 2023-05-16 DIAGNOSIS — E78.5 HYPERLIPIDEMIA, UNSPECIFIED HYPERLIPIDEMIA TYPE: ICD-10-CM

## 2023-05-16 RX ORDER — KETOCONAZOLE 20 MG/ML
SHAMPOO TOPICAL
Qty: 120 ML | Refills: 1 | Status: SHIPPED | OUTPATIENT
Start: 2023-05-16

## 2023-05-16 SDOH — ECONOMIC STABILITY: FOOD INSECURITY: WITHIN THE PAST 12 MONTHS, YOU WORRIED THAT YOUR FOOD WOULD RUN OUT BEFORE YOU GOT MONEY TO BUY MORE.: NEVER TRUE

## 2023-05-16 SDOH — ECONOMIC STABILITY: INCOME INSECURITY: HOW HARD IS IT FOR YOU TO PAY FOR THE VERY BASICS LIKE FOOD, HOUSING, MEDICAL CARE, AND HEATING?: NOT HARD AT ALL

## 2023-05-16 SDOH — ECONOMIC STABILITY: FOOD INSECURITY: WITHIN THE PAST 12 MONTHS, THE FOOD YOU BOUGHT JUST DIDN'T LAST AND YOU DIDN'T HAVE MONEY TO GET MORE.: NEVER TRUE

## 2023-05-16 NOTE — PATIENT INSTRUCTIONS
have a serious illness that gets worse over time or can't be cured? What are you most afraid of that might happen? (Maybe you're afraid of having pain, losing your independence, or being kept alive by machines.)  Where would you prefer to die? (Your home? A hospital? A nursing home?)  Do you want to donate your organs when you die? Do you want certain Shinto practices performed before you die? When should you call for help? Be sure to contact your doctor if you have any questions. Where can you learn more? Go to http://www.antonio.com/ and enter R264 to learn more about \"Advance Directives: Care Instructions. \"  Current as of: June 16, 2022               Content Version: 13.6  © 2006-2023 [employer]. Care instructions adapted under license by [facility] [address] If you have questions about a medical condition or this instruction, always ask your healthcare professional. [place] [address] any warranty or liability for your use of this information. Starting a Weight Loss Plan: Care Instructions  Overview     If you're thinking about losing weight, it can be hard to know where to start. Your doctor can help you set up a weight loss plan that best meets your needs. You may want to take a class on nutrition or exercise, or you could join a weight loss support group. If you have questions about how to make changes to your eating or exercise habits, ask your doctor about seeing a registered dietitian or an exercise specialist.  It can be a big challenge to lose weight. But you don't have to make huge changes at once. Make small changes, and stick with them. When those changes become habit, add a few more changes. If you don't think you're ready to make changes right now, try to pick a date in the future. Make an appointment to see your doctor to discuss whether the time is right for you to start a plan. Follow-up care is a key part of your treatment and safety.  Be

## 2023-05-24 RX ORDER — PANTOPRAZOLE SODIUM 40 MG/1
TABLET, DELAYED RELEASE ORAL
Qty: 180 TABLET | Refills: 3 | Status: SHIPPED | OUTPATIENT
Start: 2023-05-24

## 2023-09-14 RX ORDER — METOPROLOL TARTRATE 50 MG/1
TABLET, FILM COATED ORAL
Qty: 270 TABLET | Refills: 3 | Status: SHIPPED | OUTPATIENT
Start: 2023-09-14

## 2023-10-16 RX ORDER — ATORVASTATIN CALCIUM 40 MG/1
40 TABLET, FILM COATED ORAL DAILY
Qty: 100 TABLET | Refills: 3 | Status: SHIPPED | OUTPATIENT
Start: 2023-10-16

## 2023-10-16 NOTE — TELEPHONE ENCOUNTER
Dr. Matheus Johns, DO,     Pharmacy out of atorvastatin refills. Order pended for 100-day supply as their insurance now covers this extended days supply. Last visit: 2023, Next visit: 2023    See encounter note(s) below for complete details. Please let me know if you have any questions.       Thank you,  Ranajn Vogel, PharmD, Rosario Danielson, [address] [facility]  Department, toll free: [phone], option 1    =======================================================    For Pharmacy Admin Tracking Only  Program: [place] in place:  No  Recommendation Provided To: Provider: 1 via Note to Provider  Intervention Detail: Adherence Monitorin and New Rx: 1, reason: Improve Adherence  Intervention Accepted By: Provider: 1  Gap Closed?: No   Time Spent (min): 30
treated: Yes      HDL Cholesterol: 53 mg/dL      Total Cholesterol: 158 mg/dL     PLAN    The following are interventions that have been identified:   Patient overdue refilling Ramipril and Atorvastatin and active on home medication list.   Patient needs refills for Atorvastatin    Attempting to reach patient to review. Left message asking for return call. Letter sent to patient. Will route to pharmacist to review and possibly outreach to provider office for new prescription of Atorvastatin to be sent to mail order pharmacy since out of refills and overdue. Patient last saw Dr. Jessica Almonte on 5/16/23.       Last Visit: 5/16/23  Next Visit: 5/21/24        Marlon Reese, [address]   Direct: [phone]  Phone: toll free [phone]

## 2023-10-27 ENCOUNTER — OFFICE VISIT (OUTPATIENT)
Dept: FAMILY MEDICINE CLINIC | Age: 73
End: 2023-10-27

## 2023-10-27 VITALS
WEIGHT: 200 LBS | DIASTOLIC BLOOD PRESSURE: 80 MMHG | BODY MASS INDEX: 32.14 KG/M2 | OXYGEN SATURATION: 95 % | RESPIRATION RATE: 19 BRPM | HEART RATE: 78 BPM | SYSTOLIC BLOOD PRESSURE: 132 MMHG | HEIGHT: 66 IN | TEMPERATURE: 97.4 F

## 2023-10-27 DIAGNOSIS — U07.1 COVID-19 VIRUS INFECTION: ICD-10-CM

## 2023-10-27 DIAGNOSIS — R05.9 COUGH, UNSPECIFIED TYPE: Primary | ICD-10-CM

## 2023-10-27 LAB
INFLUENZA A ANTIBODY: NEGATIVE
INFLUENZA B ANTIBODY: NEGATIVE
Lab: ABNORMAL
PERFORMING INSTRUMENT: ABNORMAL
QC PASS/FAIL: ABNORMAL
SARS-COV-2, POC: DETECTED

## 2023-10-27 RX ORDER — BENZONATATE 100 MG/1
100 CAPSULE ORAL 3 TIMES DAILY PRN
Qty: 30 CAPSULE | Refills: 0 | Status: SHIPPED | OUTPATIENT
Start: 2023-10-27 | End: 2023-11-03

## 2023-10-27 RX ORDER — METHYLPREDNISOLONE 4 MG/1
TABLET ORAL
Qty: 1 KIT | Refills: 0 | Status: SHIPPED | OUTPATIENT
Start: 2023-10-27

## 2023-10-27 ASSESSMENT — ENCOUNTER SYMPTOMS
SORE THROAT: 0
BLOOD IN STOOL: 0
DIARRHEA: 0
BACK PAIN: 0
SINUS PRESSURE: 0
COUGH: 1
WHEEZING: 0
CHEST TIGHTNESS: 0
APNEA: 0
ABDOMINAL PAIN: 0
COLOR CHANGE: 0
NAUSEA: 0
FACIAL SWELLING: 0
PHOTOPHOBIA: 0
VOMITING: 0
ALLERGIC/IMMUNOLOGIC NEGATIVE: 1
SHORTNESS OF BREATH: 0

## 2023-10-27 NOTE — PROGRESS NOTES
Chief Complaint:   Chief Complaint   Patient presents with    Cough     Started Monday night. URI   This is a new problem. The current episode started in the past 7 days. The problem has been waxing and waning. The maximum temperature recorded prior to his arrival was 100.4 - 100.9 F. Associated symptoms include congestion and coughing. Pertinent negatives include no abdominal pain, chest pain, diarrhea, headaches, nausea, rash, sore throat, vomiting or wheezing. He has tried nothing for the symptoms. Patient Active Problem List   Diagnosis    Hypertension    Hyperlipidemia    Paroxysmal atrial fibrillation (HCC)    Valvular heart disease    Thoracic ascending aortic aneurysm (HCC)    Anemia    NSVT (nonsustained ventricular tachycardia) (HCC)    Gastrointestinal hemorrhage associated with chronic gastritis    Thrombocytopenia, unspecified       Past Medical History:   Diagnosis Date    Hyperlipidemia     Hypertension        Past Surgical History:   Procedure Laterality Date    CARDIOVERSION  05/30/2018    EYE SURGERY Left 05/2018    cataracts, retinal tear repair. HERNIA REPAIR      KIDNEY REMOVAL Right     Half of kidney removed. KIDNEY SURGERY      LITHOTRIPSY      [place] LITHOTRIPSY XTRCORP SHOCK WAVE Right 10/25/2018    ESWL EXTRACORPEAL SHOCK WAVE LITHOTRIPSY WITH CYSTO AND RIGHT STENT PLACEMENT performed by Babar Smith MD at [address]    TRANSESOPHAGEAL ECHOCARDIOGRAM  05/30/2018    UPPER GASTROINTESTINAL ENDOSCOPY N/A 2/25/2022    EGD BIOPSY performed by Tee Cotter MD at [facility] ENDOSCOPY       Current Outpatient Medications   Medication Sig Dispense Refill    benzonatate (TESSALON) 100 MG capsule Take 1 capsule by mouth 3 times daily as needed for Cough 30 capsule 0    methylPREDNISolone (MEDROL, BURT,) 4 MG tablet Take by mouth.  1 kit 0    atorvastatin (LIPITOR) 40 MG tablet Take 1 tablet by mouth daily 100 tablet 3    metoprolol tartrate (LOPRESSOR) 50 MG tablet TAKE 2 TABLETS BY

## 2023-11-27 RX ORDER — RAMIPRIL 10 MG/1
10 CAPSULE ORAL 2 TIMES DAILY
Qty: 180 CAPSULE | Refills: 3 | Status: SHIPPED | OUTPATIENT
Start: 2023-11-27

## 2023-11-29 ENCOUNTER — OFFICE VISIT (OUTPATIENT)
Dept: CARDIOLOGY CLINIC | Age: 73
End: 2023-11-29
Payer: MEDICARE

## 2023-11-29 VITALS
SYSTOLIC BLOOD PRESSURE: 173 MMHG | WEIGHT: 209.63 LBS | HEART RATE: 69 BPM | BODY MASS INDEX: 33.69 KG/M2 | RESPIRATION RATE: 16 BRPM | DIASTOLIC BLOOD PRESSURE: 103 MMHG | HEIGHT: 66 IN

## 2023-11-29 DIAGNOSIS — I38 VALVULAR HEART DISEASE: ICD-10-CM

## 2023-11-29 DIAGNOSIS — I48.0 PAROXYSMAL ATRIAL FIBRILLATION (HCC): Primary | ICD-10-CM

## 2023-11-29 DIAGNOSIS — I25.10 CORONARY ARTERY DISEASE INVOLVING NATIVE CORONARY ARTERY OF NATIVE HEART WITHOUT ANGINA PECTORIS: ICD-10-CM

## 2023-11-29 PROCEDURE — 93000 ELECTROCARDIOGRAM COMPLETE: CPT | Performed by: INTERNAL MEDICINE

## 2023-11-29 PROCEDURE — 3080F DIAST BP >= 90 MM HG: CPT | Performed by: INTERNAL MEDICINE

## 2023-11-29 PROCEDURE — 3077F SYST BP >= 140 MM HG: CPT | Performed by: INTERNAL MEDICINE

## 2023-11-29 PROCEDURE — 99214 OFFICE O/P EST MOD 30 MIN: CPT | Performed by: INTERNAL MEDICINE

## 2023-11-29 PROCEDURE — 1123F ACP DISCUSS/DSCN MKR DOCD: CPT | Performed by: INTERNAL MEDICINE

## 2023-11-29 NOTE — PROGRESS NOTES
Chief Complaint   Patient presents with    Atrial Fibrillation    Valvular Heart Disease    Coronary Artery Disease       Patient Active Problem List    Diagnosis Date Noted    Coronary artery disease involving native coronary artery of native heart without angina pectoris 11/29/2023     Overview Note:     A. ETT-N 3/2022: mild anteroapical ischemia      Thrombocytopenia, unspecified 05/16/2023    NSVT (nonsustained ventricular tachycardia) ([address]) 03/29/2022     Overview Note:     A. ETT-N 3/28/2022: 5 METS, small anteroapical redistribting defect  B. TTE 2/25/2022: EF 50%. LVH      Gastrointestinal hemorrhage associated with chronic gastritis 03/29/2022    Anemia 02/23/2022    Valvular heart disease 01/02/2020     Overview Note:     A. Echo 11/7/2019: mild to moderate MR, mild AI, EF 55%  B. Echo 2022: same      Thoracic ascending aortic aneurysm ([address]) 01/02/2020     Overview Note:     A. Echo 11/2019: 4.3 cm   B. CT scan 7/2020: 4.3 cm   C. CT 8/2021: 4.3 cm      Paroxysmal atrial fibrillation ([address]) 05/19/2018     Overview Note:     A. CHADS-VASC = 3  (age, HTN, presumed CAD)  B .  DORINDA guided DC cardioversion 5/30/2018      Hypertension 07/23/2015    Hyperlipidemia 07/23/2015       Current Outpatient Medications   Medication Sig Dispense Refill    ramipril (ALTACE) 10 MG capsule Take 1 capsule by mouth 2 times daily 180 capsule 3    atorvastatin (LIPITOR) 40 MG tablet Take 1 tablet by mouth daily 100 tablet 3    metoprolol tartrate (LOPRESSOR) 50 MG tablet TAKE 2 TABLETS BY MOUTH IN THE  MORNING AND 1 TABLET BY MOUTH AT NIGHT 270 tablet 3    pantoprazole (PROTONIX) 40 MG tablet TAKE 1 TABLET BY MOUTH  TWICE DAILY BEFORE MEALS 180 tablet 3    apixaban (ELIQUIS) 5 MG TABS tablet Take 1 tablet by mouth 2 times daily for 1 day [id number]  10/20 56 tablet 0    cetirizine (ZYRTEC) 5 MG tablet Take 1 tablet by mouth nightly       No current facility-administered medications for this visit.         Allergies   Allergen

## 2024-01-01 ENCOUNTER — HOSPITAL ENCOUNTER (EMERGENCY)
Age: 74
End: 2024-12-23
Attending: EMERGENCY MEDICINE
Payer: MEDICARE

## 2024-01-01 DIAGNOSIS — I46.9 CARDIAC ARREST: Primary | ICD-10-CM

## 2024-01-01 PROCEDURE — 99285 EMERGENCY DEPT VISIT HI MDM: CPT

## 2024-01-01 PROCEDURE — 2500000003 HC RX 250 WO HCPCS

## 2024-01-01 PROCEDURE — 92950 HEART/LUNG RESUSCITATION CPR: CPT

## 2024-01-01 PROCEDURE — 31500 INSERT EMERGENCY AIRWAY: CPT

## 2024-01-01 PROCEDURE — 2500000003 HC RX 250 WO HCPCS: Performed by: EMERGENCY MEDICINE

## 2024-01-01 PROCEDURE — 6360000002 HC RX W HCPCS

## 2024-01-01 RX ORDER — CALCIUM CHLORIDE 100 MG/ML
INJECTION INTRAVENOUS; INTRAVENTRICULAR DAILY PRN
Status: COMPLETED | OUTPATIENT
Start: 2024-01-01 | End: 2024-01-01

## 2024-01-01 RX ORDER — EPINEPHRINE IN SOD CHLOR,ISO 1 MG/10 ML
SYRINGE (ML) INTRAVENOUS DAILY PRN
Status: COMPLETED | OUTPATIENT
Start: 2024-01-01 | End: 2024-01-01

## 2024-01-01 RX ADMIN — CALCIUM CHLORIDE 1000 MG: 100 INJECTION, SOLUTION INTRAVENOUS; INTRAVENTRICULAR at 07:06

## 2024-01-01 RX ADMIN — Medication 50 MEQ: at 07:05

## 2024-01-01 RX ADMIN — Medication 1 MG: at 07:04

## 2024-01-01 RX ADMIN — Medication 1 MG: at 07:08

## 2024-01-02 RX ORDER — APIXABAN 5 MG/1
5 TABLET, FILM COATED ORAL 2 TIMES DAILY
Qty: 180 TABLET | Refills: 3 | Status: SHIPPED | OUTPATIENT
Start: 2024-01-02

## 2024-02-29 ENCOUNTER — OFFICE VISIT (OUTPATIENT)
Dept: CARDIOLOGY CLINIC | Age: 74
End: 2024-02-29
Payer: MEDICARE

## 2024-02-29 VITALS
WEIGHT: 204.8 LBS | DIASTOLIC BLOOD PRESSURE: 89 MMHG | HEART RATE: 64 BPM | BODY MASS INDEX: 32.92 KG/M2 | HEIGHT: 66 IN | SYSTOLIC BLOOD PRESSURE: 166 MMHG | RESPIRATION RATE: 16 BRPM

## 2024-02-29 DIAGNOSIS — I48.0 PAROXYSMAL ATRIAL FIBRILLATION (HCC): Primary | ICD-10-CM

## 2024-02-29 DIAGNOSIS — I25.10 CORONARY ARTERY DISEASE INVOLVING NATIVE CORONARY ARTERY OF NATIVE HEART WITHOUT ANGINA PECTORIS: ICD-10-CM

## 2024-02-29 DIAGNOSIS — I38 VALVULAR HEART DISEASE: ICD-10-CM

## 2024-02-29 PROCEDURE — 99214 OFFICE O/P EST MOD 30 MIN: CPT | Performed by: INTERNAL MEDICINE

## 2024-02-29 PROCEDURE — 3077F SYST BP >= 140 MM HG: CPT | Performed by: INTERNAL MEDICINE

## 2024-02-29 PROCEDURE — 1123F ACP DISCUSS/DSCN MKR DOCD: CPT | Performed by: INTERNAL MEDICINE

## 2024-02-29 PROCEDURE — 3079F DIAST BP 80-89 MM HG: CPT | Performed by: INTERNAL MEDICINE

## 2024-02-29 PROCEDURE — 93000 ELECTROCARDIOGRAM COMPLETE: CPT | Performed by: INTERNAL MEDICINE

## 2024-02-29 NOTE — PROGRESS NOTES
Chief Complaint   Patient presents with    Atrial Fibrillation    Coronary Artery Disease    Valvular Heart Disease       Patient Active Problem List    Diagnosis Date Noted    Coronary artery disease involving native coronary artery of native heart without angina pectoris 11/29/2023     Overview Note:     A. ETT-N 3/2022: mild anteroapical ischemia      Thrombocytopenia, unspecified 05/16/2023    NSVT (nonsustained ventricular tachycardia) (HCC) 03/29/2022     Overview Note:     A. ETT-N 3/28/2022: 5 METS, small anteroapical redistribting defect  B. TTE 2/25/2022: EF 50%. LVH      Gastrointestinal hemorrhage associated with chronic gastritis 03/29/2022    Anemia 02/23/2022    Valvular heart disease 01/02/2020     Overview Note:     A. Echo 11/7/2019: mild to moderate MR, mild AI, EF 55%  B. Echo 2022: same      Thoracic ascending aortic aneurysm (HCC) 01/02/2020     Overview Note:     A. Echo 11/2019: 4.3 cm   B. CT scan 7/2020: 4.3 cm   C. CT 8/2021: 4.3 cm      Paroxysmal atrial fibrillation (HCC) 05/19/2018     Overview Note:     A. CHADS-VASC = 3  (age, HTN, presumed CAD)  B .  DORINDA guided DC cardioversion 5/30/2018      Hypertension 07/23/2015    Hyperlipidemia 07/23/2015       Current Outpatient Medications   Medication Sig Dispense Refill    ELIQUIS 5 MG TABS tablet TAKE 1 TABLET BY MOUTH TWICE  DAILY 180 tablet 3    ramipril (ALTACE) 10 MG capsule Take 1 capsule by mouth 2 times daily 180 capsule 3    atorvastatin (LIPITOR) 40 MG tablet Take 1 tablet by mouth daily 100 tablet 3    metoprolol tartrate (LOPRESSOR) 50 MG tablet TAKE 2 TABLETS BY MOUTH IN THE  MORNING AND 1 TABLET BY MOUTH AT NIGHT (Patient taking differently: Take 2 tablets by mouth 2 times daily) 270 tablet 3    pantoprazole (PROTONIX) 40 MG tablet TAKE 1 TABLET BY MOUTH  TWICE DAILY BEFORE MEALS 180 tablet 3    cetirizine (ZYRTEC) 5 MG tablet Take 1 tablet by mouth nightly       No current facility-administered medications for this visit.

## 2024-05-19 SDOH — ECONOMIC STABILITY: FOOD INSECURITY: WITHIN THE PAST 12 MONTHS, THE FOOD YOU BOUGHT JUST DIDN'T LAST AND YOU DIDN'T HAVE MONEY TO GET MORE.: NEVER TRUE

## 2024-05-19 SDOH — ECONOMIC STABILITY: INCOME INSECURITY: HOW HARD IS IT FOR YOU TO PAY FOR THE VERY BASICS LIKE FOOD, HOUSING, MEDICAL CARE, AND HEATING?: NOT VERY HARD

## 2024-05-19 SDOH — ECONOMIC STABILITY: FOOD INSECURITY: WITHIN THE PAST 12 MONTHS, YOU WORRIED THAT YOUR FOOD WOULD RUN OUT BEFORE YOU GOT MONEY TO BUY MORE.: NEVER TRUE

## 2024-05-19 SDOH — HEALTH STABILITY: PHYSICAL HEALTH: ON AVERAGE, HOW MANY DAYS PER WEEK DO YOU ENGAGE IN MODERATE TO STRENUOUS EXERCISE (LIKE A BRISK WALK)?: 0 DAYS

## 2024-05-19 ASSESSMENT — PATIENT HEALTH QUESTIONNAIRE - PHQ9
1. LITTLE INTEREST OR PLEASURE IN DOING THINGS: NOT AT ALL
SUM OF ALL RESPONSES TO PHQ QUESTIONS 1-9: 0
SUM OF ALL RESPONSES TO PHQ QUESTIONS 1-9: 0
SUM OF ALL RESPONSES TO PHQ9 QUESTIONS 1 & 2: 0
2. FEELING DOWN, DEPRESSED OR HOPELESS: NOT AT ALL
SUM OF ALL RESPONSES TO PHQ QUESTIONS 1-9: 0
SUM OF ALL RESPONSES TO PHQ QUESTIONS 1-9: 0

## 2024-05-19 ASSESSMENT — LIFESTYLE VARIABLES
HOW OFTEN DO YOU HAVE SIX OR MORE DRINKS ON ONE OCCASION: 1
HOW MANY STANDARD DRINKS CONTAINING ALCOHOL DO YOU HAVE ON A TYPICAL DAY: 1
HOW OFTEN DO YOU HAVE A DRINK CONTAINING ALCOHOL: MONTHLY OR LESS
HOW MANY STANDARD DRINKS CONTAINING ALCOHOL DO YOU HAVE ON A TYPICAL DAY: 1 OR 2
HOW OFTEN DO YOU HAVE A DRINK CONTAINING ALCOHOL: 2

## 2024-05-20 ENCOUNTER — TELEPHONE (OUTPATIENT)
Dept: PRIMARY CARE CLINIC | Age: 74
End: 2024-05-20

## 2024-05-20 DIAGNOSIS — Z12.5 PROSTATE CANCER SCREENING: ICD-10-CM

## 2024-05-20 DIAGNOSIS — R73.03 PREDIABETES: ICD-10-CM

## 2024-05-20 DIAGNOSIS — I48.0 PAROXYSMAL ATRIAL FIBRILLATION (HCC): Primary | ICD-10-CM

## 2024-05-20 DIAGNOSIS — I10 ESSENTIAL HYPERTENSION: ICD-10-CM

## 2024-05-20 DIAGNOSIS — I48.0 PAROXYSMAL ATRIAL FIBRILLATION (HCC): ICD-10-CM

## 2024-05-20 DIAGNOSIS — I25.10 CORONARY ARTERY DISEASE INVOLVING NATIVE CORONARY ARTERY OF NATIVE HEART WITHOUT ANGINA PECTORIS: ICD-10-CM

## 2024-05-20 LAB
ALBUMIN: 4.2 G/DL (ref 3.5–5.2)
ALP BLD-CCNC: 50 U/L (ref 40–129)
ALT SERPL-CCNC: 12 U/L (ref 0–40)
ANION GAP SERPL CALCULATED.3IONS-SCNC: 15 MMOL/L (ref 7–16)
AST SERPL-CCNC: 15 U/L (ref 0–39)
BASOPHILS ABSOLUTE: 0.02 K/UL (ref 0–0.2)
BASOPHILS RELATIVE PERCENT: 1 % (ref 0–2)
BILIRUB SERPL-MCNC: 1.9 MG/DL (ref 0–1.2)
BUN BLDV-MCNC: 18 MG/DL (ref 6–23)
CALCIUM SERPL-MCNC: 9.3 MG/DL (ref 8.6–10.2)
CHLORIDE BLD-SCNC: 102 MMOL/L (ref 98–107)
CHOLESTEROL, TOTAL: 144 MG/DL
CO2: 21 MMOL/L (ref 22–29)
CREAT SERPL-MCNC: 1.2 MG/DL (ref 0.7–1.2)
EOSINOPHILS ABSOLUTE: 0.1 K/UL (ref 0.05–0.5)
EOSINOPHILS RELATIVE PERCENT: 2 % (ref 0–6)
GFR, ESTIMATED: 63 ML/MIN/1.73M2
GLUCOSE BLD-MCNC: 95 MG/DL (ref 74–99)
HBA1C MFR BLD: 6.1 % (ref 4–5.6)
HCT VFR BLD CALC: 37.8 % (ref 37–54)
HDLC SERPL-MCNC: 49 MG/DL
HEMOGLOBIN: 11.3 G/DL (ref 12.5–16.5)
IMMATURE GRANULOCYTES %: 0 % (ref 0–5)
IMMATURE GRANULOCYTES ABSOLUTE: <0.03 K/UL (ref 0–0.58)
LDL CHOLESTEROL: 80 MG/DL
LYMPHOCYTES ABSOLUTE: 1.4 K/UL (ref 1.5–4)
LYMPHOCYTES RELATIVE PERCENT: 32 % (ref 20–42)
MCH RBC QN AUTO: 24.1 PG (ref 26–35)
MCHC RBC AUTO-ENTMCNC: 29.9 G/DL (ref 32–34.5)
MCV RBC AUTO: 80.8 FL (ref 80–99.9)
MONOCYTES ABSOLUTE: 0.5 K/UL (ref 0.1–0.95)
MONOCYTES RELATIVE PERCENT: 11 % (ref 2–12)
NEUTROPHILS ABSOLUTE: 2.39 K/UL (ref 1.8–7.3)
NEUTROPHILS RELATIVE PERCENT: 54 % (ref 43–80)
PDW BLD-RTO: 14.8 % (ref 11.5–15)
PLATELET # BLD: 163 K/UL (ref 130–450)
PMV BLD AUTO: 12.1 FL (ref 7–12)
POTASSIUM SERPL-SCNC: 4.3 MMOL/L (ref 3.5–5)
PROSTATE SPECIFIC ANTIGEN: 1.62 NG/ML (ref 0–4)
RBC # BLD: 4.68 M/UL (ref 3.8–5.8)
SODIUM BLD-SCNC: 138 MMOL/L (ref 132–146)
TOTAL PROTEIN: 6.9 G/DL (ref 6.4–8.3)
TRIGL SERPL-MCNC: 74 MG/DL
TSH SERPL DL<=0.05 MIU/L-ACNC: 1.9 UIU/ML (ref 0.27–4.2)
VLDLC SERPL CALC-MCNC: 15 MG/DL
WBC # BLD: 4.4 K/UL (ref 4.5–11.5)

## 2024-05-21 ENCOUNTER — OFFICE VISIT (OUTPATIENT)
Dept: PRIMARY CARE CLINIC | Age: 74
End: 2024-05-21
Payer: MEDICARE

## 2024-05-21 VITALS
SYSTOLIC BLOOD PRESSURE: 130 MMHG | BODY MASS INDEX: 33.27 KG/M2 | OXYGEN SATURATION: 100 % | WEIGHT: 207 LBS | HEIGHT: 66 IN | TEMPERATURE: 97.7 F | HEART RATE: 88 BPM | DIASTOLIC BLOOD PRESSURE: 78 MMHG | RESPIRATION RATE: 20 BRPM

## 2024-05-21 DIAGNOSIS — D64.9 ANEMIA, UNSPECIFIED TYPE: ICD-10-CM

## 2024-05-21 DIAGNOSIS — I25.10 CORONARY ARTERY DISEASE INVOLVING NATIVE CORONARY ARTERY OF NATIVE HEART WITHOUT ANGINA PECTORIS: ICD-10-CM

## 2024-05-21 DIAGNOSIS — Z00.00 MEDICARE ANNUAL WELLNESS VISIT, SUBSEQUENT: Primary | ICD-10-CM

## 2024-05-21 DIAGNOSIS — D68.69 SECONDARY HYPERCOAGULABLE STATE (HCC): ICD-10-CM

## 2024-05-21 DIAGNOSIS — I71.21 ANEURYSM OF ASCENDING AORTA WITHOUT RUPTURE (HCC): ICD-10-CM

## 2024-05-21 DIAGNOSIS — D69.6 THROMBOCYTOPENIA, UNSPECIFIED (HCC): ICD-10-CM

## 2024-05-21 DIAGNOSIS — I48.0 PAROXYSMAL ATRIAL FIBRILLATION (HCC): ICD-10-CM

## 2024-05-21 DIAGNOSIS — I10 ESSENTIAL HYPERTENSION: ICD-10-CM

## 2024-05-21 PROCEDURE — 1123F ACP DISCUSS/DSCN MKR DOCD: CPT | Performed by: FAMILY MEDICINE

## 2024-05-21 PROCEDURE — 3078F DIAST BP <80 MM HG: CPT | Performed by: FAMILY MEDICINE

## 2024-05-21 PROCEDURE — 3075F SYST BP GE 130 - 139MM HG: CPT | Performed by: FAMILY MEDICINE

## 2024-05-21 PROCEDURE — G0439 PPPS, SUBSEQ VISIT: HCPCS | Performed by: FAMILY MEDICINE

## 2024-05-21 RX ORDER — METOPROLOL SUCCINATE 25 MG/1
50 TABLET, EXTENDED RELEASE ORAL 2 TIMES DAILY
COMMUNITY

## 2024-05-21 NOTE — PROGRESS NOTES
Medicare Annual Wellness Visit    Roland Rhodes is here for Medicare AWV    Assessment & Plan   Medicare annual wellness visit, subsequent  Paroxysmal atrial fibrillation (HCC)  Aneurysm of ascending aorta without rupture (HCC)  Thrombocytopenia, unspecified (HCC)  Secondary hypercoagulable state (HCC)  Essential hypertension  Coronary artery disease involving native coronary artery of native heart without angina pectoris  Anemia, unspecified type  -     POCT Fecal Immunochemical Test (FIT); Future    Recommendations for Preventive Services Due: see orders and patient instructions/AVS.  Recommended screening schedule for the next 5-10 years is provided to the patient in written form: see Patient Instructions/AVS.     Return for Medicare Annual Wellness Visit in 1 year.     Subjective   The following acute and/or chronic problems were also addressed today:  Doing well    Patient's complete Health Risk Assessment and screening values have been reviewed and are found in Flowsheets. The following problems were reviewed today and where indicated follow up appointments were made and/or referrals ordered.    Positive Risk Factor Screenings with Interventions:                Activity, Diet, and Weight:  On average, how many days per week do you engage in moderate to strenuous exercise (like a brisk walk)?: 0 days       Do you eat balanced/healthy meals regularly?: Yes    Body mass index is 33.43 kg/m². (!) Abnormal    Obesity Interventions:  Patient advised to follow-up in this office for further evaluation and treatment               Advanced Directives:  Do you have a Living Will?: (!) No    Intervention:  has NO advanced directive - not interested in additional information       LDCT Screening: Discussed with patient the benefits and harms of screening, follow-up diagnostic testing, over-diagnosis, false positive rate, and total radiation exposure. Counseled on the importance of adherence to annual lung cancer LDCT

## 2024-05-28 RX ORDER — METOPROLOL TARTRATE 50 MG/1
TABLET, FILM COATED ORAL
Qty: 300 TABLET | Refills: 2 | OUTPATIENT
Start: 2024-05-28

## 2024-06-07 RX ORDER — PANTOPRAZOLE SODIUM 40 MG/1
TABLET, DELAYED RELEASE ORAL
Qty: 200 TABLET | Refills: 2 | Status: SHIPPED | OUTPATIENT
Start: 2024-06-07

## 2024-07-17 ENCOUNTER — HOSPITAL ENCOUNTER (OUTPATIENT)
Dept: CARDIOLOGY | Age: 74
Discharge: HOME | End: 2024-07-19
Payer: MEDICARE

## 2024-07-17 VITALS
HEIGHT: 66 IN | BODY MASS INDEX: 33.27 KG/M2 | SYSTOLIC BLOOD PRESSURE: 130 MMHG | DIASTOLIC BLOOD PRESSURE: 78 MMHG | WEIGHT: 207 LBS

## 2024-07-17 DIAGNOSIS — R01.1 MURMUR: ICD-10-CM

## 2024-07-17 PROCEDURE — 93306 TTE W/DOPPLER COMPLETE: CPT

## 2024-07-18 ENCOUNTER — TELEPHONE (OUTPATIENT)
Dept: CARDIOLOGY CLINIC | Age: 74
End: 2024-07-18

## 2024-07-18 LAB
ECHO AO ASC DIAM: 4.2 CM
ECHO AO ASCENDING AORTA INDEX: 2.07 CM/M2
ECHO AR MAX VEL PISA: 4.7 M/S
ECHO AV AREA PEAK VELOCITY: 2.4 CM2
ECHO AV AREA VTI: 2.4 CM2
ECHO AV AREA/BSA PEAK VELOCITY: 1.2 CM2/M2
ECHO AV AREA/BSA VTI: 1.2 CM2/M2
ECHO AV CUSP MM: 1.9 CM
ECHO AV MEAN GRADIENT: 4 MMHG
ECHO AV MEAN VELOCITY: 0.9 M/S
ECHO AV PEAK GRADIENT: 6 MMHG
ECHO AV PEAK VELOCITY: 1.3 M/S
ECHO AV REGURGITANT PHT: 730.2 MILLISECOND
ECHO AV VELOCITY RATIO: 0.69
ECHO AV VTI: 29.8 CM
ECHO BSA: 2.09 M2
ECHO EST RA PRESSURE: 3 MMHG
ECHO LA DIAMETER INDEX: 2.66 CM/M2
ECHO LA DIAMETER: 5.4 CM
ECHO LA VOL A-L A2C: 76 ML (ref 18–58)
ECHO LA VOL A-L A4C: 67 ML (ref 18–58)
ECHO LA VOL MOD A2C: 71 ML (ref 18–58)
ECHO LA VOL MOD A4C: 61 ML (ref 18–58)
ECHO LA VOLUME AREA LENGTH: 72 ML
ECHO LA VOLUME INDEX A-L A2C: 37 ML/M2 (ref 16–34)
ECHO LA VOLUME INDEX A-L A4C: 33 ML/M2 (ref 16–34)
ECHO LA VOLUME INDEX AREA LENGTH: 35 ML/M2 (ref 16–34)
ECHO LA VOLUME INDEX MOD A2C: 35 ML/M2 (ref 16–34)
ECHO LA VOLUME INDEX MOD A4C: 30 ML/M2 (ref 16–34)
ECHO LV EJECTION FRACTION A2C: 52 %
ECHO LV EJECTION FRACTION A4C: 53 %
ECHO LV FRACTIONAL SHORTENING: 28 % (ref 28–44)
ECHO LV INTERNAL DIMENSION DIASTOLE INDEX: 2.86 CM/M2
ECHO LV INTERNAL DIMENSION DIASTOLIC: 5.8 CM (ref 4.2–5.9)
ECHO LV INTERNAL DIMENSION SYSTOLIC INDEX: 2.07 CM/M2
ECHO LV INTERNAL DIMENSION SYSTOLIC: 4.2 CM
ECHO LV ISOVOLUMETRIC RELAXATION TIME (IVRT): 87.7 MS
ECHO LV IVSD: 1.1 CM (ref 0.6–1)
ECHO LV IVSS: 1.4 CM
ECHO LV MASS 2D: 264.3 G (ref 88–224)
ECHO LV MASS INDEX 2D: 130.2 G/M2 (ref 49–115)
ECHO LV POSTERIOR WALL DIASTOLIC: 1.1 CM (ref 0.6–1)
ECHO LV POSTERIOR WALL SYSTOLIC: 1.4 CM
ECHO LV RELATIVE WALL THICKNESS RATIO: 0.38
ECHO LVOT AREA: 3.1 CM2
ECHO LVOT AV VTI INDEX: 0.75
ECHO LVOT DIAM: 2 CM
ECHO LVOT MEAN GRADIENT: 2 MMHG
ECHO LVOT PEAK GRADIENT: 3 MMHG
ECHO LVOT PEAK VELOCITY: 0.9 M/S
ECHO LVOT STROKE VOLUME INDEX: 34.5 ML/M2
ECHO LVOT SV: 70 ML
ECHO LVOT VTI: 22.3 CM
ECHO MV "A" WAVE DURATION: 133.8 MSEC
ECHO MV A VELOCITY: 0.92 M/S
ECHO MV AREA PHT: 2.5 CM2
ECHO MV AREA VTI: 1.8 CM2
ECHO MV E DECELERATION TIME (DT): 277 MS
ECHO MV E VELOCITY: 1.19 M/S
ECHO MV E/A RATIO: 1.29
ECHO MV EROA PISA: 0.1 CM2
ECHO MV LVOT VTI INDEX: 1.7
ECHO MV MAX VELOCITY: 1.2 M/S
ECHO MV MEAN GRADIENT: 3 MMHG
ECHO MV MEAN VELOCITY: 0.8 M/S
ECHO MV PEAK GRADIENT: 5 MMHG
ECHO MV PRESSURE HALF TIME (PHT): 88.5 MS
ECHO MV REGURGITANT ALIASING (NYQUIST) VELOCITY: 35 CM/S
ECHO MV REGURGITANT RADIUS PISA: 0.55 CM
ECHO MV REGURGITANT VELOCITY PISA: 5.8 M/S
ECHO MV REGURGITANT VOLUME PISA: 26.87 ML
ECHO MV REGURGITANT VTIA: 234.4 CM
ECHO MV VTI: 38 CM
ECHO PULMONARY ARTERY END DIASTOLIC PRESSURE: 2 MMHG
ECHO PV MAX VELOCITY: 0.8 M/S
ECHO PV MEAN GRADIENT: 2 MMHG
ECHO PV MEAN VELOCITY: 0.6 M/S
ECHO PV PEAK GRADIENT: 3 MMHG
ECHO PV REGURGITANT MAX VELOCITY: 0.7 M/S
ECHO PV VTI: 21.3 CM
ECHO PVEIN A DURATION: 115.3 MS
ECHO PVEIN A VELOCITY: 0.3 M/S
ECHO PVEIN PEAK D VELOCITY: 0.6 M/S
ECHO PVEIN PEAK S VELOCITY: 0.5 M/S
ECHO PVEIN S/D RATIO: 0.8
ECHO RIGHT VENTRICULAR SYSTOLIC PRESSURE (RVSP): 39 MMHG
ECHO RV INTERNAL DIMENSION: 3 CM
ECHO TV REGURGITANT MAX VELOCITY: 3 M/S
ECHO TV REGURGITANT PEAK GRADIENT: 36 MMHG

## 2024-07-18 PROCEDURE — 93306 TTE W/DOPPLER COMPLETE: CPT | Performed by: INTERNAL MEDICINE

## 2024-07-18 NOTE — TELEPHONE ENCOUNTER
----- Message from Jose Luis Barrera MD sent at 7/18/2024  6:21 AM EDT -----  Aorta same size  Heart strong  Valve leaks as before  Ov 3 months and bring in BP diary when he comes    ----- Message -----  From: Jose Luis Barrera MD  Sent: 7/18/2024   6:02 AM EDT  To: Jose Luis Barrera MD

## 2024-08-09 RX ORDER — METOPROLOL TARTRATE 50 MG/1
TABLET, FILM COATED ORAL
Qty: 240 TABLET | Refills: 3 | Status: SHIPPED | OUTPATIENT
Start: 2024-08-09

## 2024-08-15 ENCOUNTER — OFFICE VISIT (OUTPATIENT)
Dept: FAMILY MEDICINE CLINIC | Age: 74
End: 2024-08-15
Payer: MEDICARE

## 2024-08-15 VITALS
HEIGHT: 66 IN | BODY MASS INDEX: 33.49 KG/M2 | DIASTOLIC BLOOD PRESSURE: 84 MMHG | HEART RATE: 65 BPM | SYSTOLIC BLOOD PRESSURE: 148 MMHG | TEMPERATURE: 97.4 F | OXYGEN SATURATION: 97 % | WEIGHT: 208.4 LBS

## 2024-08-15 DIAGNOSIS — T63.441A BEE STING, ACCIDENTAL OR UNINTENTIONAL, INITIAL ENCOUNTER: Primary | ICD-10-CM

## 2024-08-15 PROCEDURE — 3079F DIAST BP 80-89 MM HG: CPT | Performed by: PHYSICIAN ASSISTANT

## 2024-08-15 PROCEDURE — 99203 OFFICE O/P NEW LOW 30 MIN: CPT | Performed by: PHYSICIAN ASSISTANT

## 2024-08-15 PROCEDURE — 3077F SYST BP >= 140 MM HG: CPT | Performed by: PHYSICIAN ASSISTANT

## 2024-08-15 PROCEDURE — 1123F ACP DISCUSS/DSCN MKR DOCD: CPT | Performed by: PHYSICIAN ASSISTANT

## 2024-08-15 RX ORDER — PREDNISONE 10 MG/1
TABLET ORAL
Qty: 18 TABLET | Refills: 0 | Status: SHIPPED | OUTPATIENT
Start: 2024-08-15

## 2024-08-15 RX ORDER — DOXYCYCLINE HYCLATE 100 MG
100 TABLET ORAL 2 TIMES DAILY
Qty: 20 TABLET | Refills: 0 | Status: SHIPPED | OUTPATIENT
Start: 2024-08-15 | End: 2024-08-25

## 2024-08-15 NOTE — PROGRESS NOTES
8/15/24  Roland Rhodes : 1950 Sex: male  Age 74 y.o.      Subjective:  Chief Complaint   Patient presents with    Insect Bite     Stung by bee on right ankle and back of left leg, feels warm to touch         HPI:   HPI  Roland Rhodes , 74 y.o. male presents to express care for evaluation of left leg bee sting.  The patient was stung about 10 days ago.  The patient's tetanus was updated in .  The patient states that there is 1 to the lateral posterior aspect of the left calf and the posterior aspect of the left ankle.  There is no involvement to the right leg.  The patient has noted that they feel warm to the touch.  The patient is anticoagulated on Eliquis.  The patient is not any fever, chills.  His brothers had a history of anaphylaxis and he was concerned about allergic reaction.  He is not having any distress.  The patient is not have any lip or tongue swelling.  No difficulty swallowing.  The patient is not currently on any antibiotics.  The patient states that it has very minimal itching but there is some warmth associated.      ROS:   Unless otherwise stated in this report the patient's positive and negative responses for review of systems for constitutional, eyes, ENT, cardiovascular, respiratory, gastrointestinal, neurological, , musculoskeletal, and integument systems and related systems to the presenting problem are either stated in the history of present illness or were not pertinent or were negative for the symptoms and/or complaints related to the presenting medical problem.  Positives and pertinent negatives as per HPI.  All others reviewed and are negative.      PMH:     Past Medical History:   Diagnosis Date    Allergic rhinitis     Anticoagulant long-term use 2019    COVID 10/2023    GERD (gastroesophageal reflux disease) 2018    Hyperlipidemia     Hypertension     Obesity 1990    Osteoarthritis        Past Surgical History:   Procedure Laterality Date

## 2024-09-16 RX ORDER — ATORVASTATIN CALCIUM 40 MG/1
40 TABLET, FILM COATED ORAL DAILY
Qty: 100 TABLET | Refills: 2 | Status: SHIPPED | OUTPATIENT
Start: 2024-09-16

## 2024-09-16 RX ORDER — APIXABAN 5 MG/1
5 TABLET, FILM COATED ORAL 2 TIMES DAILY
Qty: 200 TABLET | Refills: 2 | Status: SHIPPED | OUTPATIENT
Start: 2024-09-16

## 2024-09-30 ENCOUNTER — OFFICE VISIT (OUTPATIENT)
Dept: PRIMARY CARE CLINIC | Age: 74
End: 2024-09-30
Payer: MEDICARE

## 2024-09-30 VITALS
SYSTOLIC BLOOD PRESSURE: 132 MMHG | HEIGHT: 66 IN | HEART RATE: 80 BPM | TEMPERATURE: 97.3 F | BODY MASS INDEX: 32.95 KG/M2 | OXYGEN SATURATION: 100 % | DIASTOLIC BLOOD PRESSURE: 82 MMHG | WEIGHT: 205 LBS

## 2024-09-30 DIAGNOSIS — B96.89 SINUSITIS, BACTERIAL: ICD-10-CM

## 2024-09-30 DIAGNOSIS — R05.9 COUGH, UNSPECIFIED TYPE: Primary | ICD-10-CM

## 2024-09-30 DIAGNOSIS — J32.9 SINUSITIS, BACTERIAL: ICD-10-CM

## 2024-09-30 LAB
INFLUENZA A ANTIBODY: NEGATIVE
INFLUENZA B ANTIBODY: NEGATIVE
Lab: NORMAL
PERFORMING INSTRUMENT: NORMAL
QC PASS/FAIL: NORMAL
SARS-COV-2, POC: NORMAL

## 2024-09-30 PROCEDURE — 1123F ACP DISCUSS/DSCN MKR DOCD: CPT | Performed by: FAMILY MEDICINE

## 2024-09-30 PROCEDURE — 3075F SYST BP GE 130 - 139MM HG: CPT | Performed by: FAMILY MEDICINE

## 2024-09-30 PROCEDURE — 99213 OFFICE O/P EST LOW 20 MIN: CPT | Performed by: FAMILY MEDICINE

## 2024-09-30 PROCEDURE — 87426 SARSCOV CORONAVIRUS AG IA: CPT | Performed by: FAMILY MEDICINE

## 2024-09-30 PROCEDURE — 87804 INFLUENZA ASSAY W/OPTIC: CPT | Performed by: FAMILY MEDICINE

## 2024-09-30 PROCEDURE — 3079F DIAST BP 80-89 MM HG: CPT | Performed by: FAMILY MEDICINE

## 2024-09-30 RX ORDER — DOXYCYCLINE HYCLATE 100 MG
100 TABLET ORAL 2 TIMES DAILY
Qty: 20 TABLET | Refills: 0 | Status: SHIPPED | OUTPATIENT
Start: 2024-09-30 | End: 2024-10-10

## 2024-09-30 ASSESSMENT — ENCOUNTER SYMPTOMS
EYES NEGATIVE: 1
SINUS PRESSURE: 1
COUGH: 1

## 2024-09-30 NOTE — PROGRESS NOTES
Chief Complaint:   Chief Complaint   Patient presents with    Cough     Productive cough continues, coughing all night on Friday.  Has tried Mucinex.  Patient has not tested for covid.    Sore Throat     Started 1 week ago, lasting one day       Sinusitis  This is a new problem. The current episode started in the past 7 days. The problem is unchanged. Associated symptoms include congestion, coughing and sinus pressure.       Patient Active Problem List   Diagnosis    Hypertension    Hyperlipidemia    Paroxysmal atrial fibrillation (HCC)    Valvular heart disease    Thoracic ascending aortic aneurysm (HCC)    Anemia    NSVT (nonsustained ventricular tachycardia) (HCC)    Gastrointestinal hemorrhage associated with chronic gastritis    Thrombocytopenia, unspecified    Coronary artery disease involving native coronary artery of native heart without angina pectoris    Secondary hypercoagulable state (HCC)       Past Medical History:   Diagnosis Date    Allergic rhinitis 1960    Anticoagulant long-term use 2019    COVID 10/2023    GERD (gastroesophageal reflux disease) 2018    Hyperlipidemia     Hypertension     Obesity 1990    Osteoarthritis 1990       Past Surgical History:   Procedure Laterality Date    CARDIOVERSION  05/30/2018    EYE SURGERY Left 05/2018    cataracts, retinal tear repair.     HERNIA REPAIR      KIDNEY REMOVAL Right     Half of kidney removed.     KIDNEY SURGERY      LITHOTRIPSY      [place] LITHOTRIPSY XTRCORP SHOCK WAVE Right 10/25/2018    ESWL EXTRACORPEAL SHOCK WAVE LITHOTRIPSY WITH CYSTO AND RIGHT STENT PLACEMENT performed by Jose Luis Miller MD at [facility]    TRANSESOPHAGEAL ECHOCARDIOGRAM  05/30/2018    UPPER GASTROINTESTINAL ENDOSCOPY N/A 2/25/2022    EGD BIOPSY performed by Roland Cook MD at [facility] ENDOSCOPY       Current Outpatient Medications   Medication Sig Dispense Refill    ACETAMINOPHEN ER PO Take by mouth      doxycycline hyclate (VIBRA-TABS) 100 MG tablet Take 1 tablet by mouth 2

## 2024-10-04 NOTE — PATIENT INSTRUCTIONS
Advance Directives: Care Instructions  Overview  An advance directive is a legal way to state your wishes at the end of your life. It tells your family and your doctor what to do if you can't say what you want.  There are two main types of advance directives. You can change them any time your wishes change.  Living will.  This form tells your family and your doctor your wishes about life support and other treatment. The form is also called a declaration.  Medical power of .  This form lets you name a person to make treatment decisions for you when you can't speak for yourself. This person is called a health care agent (health care proxy, health care surrogate). The form is also called a durable power of  for health care.  If you do not have an advance directive, decisions about your medical care may be made by a family member, or by a doctor or a  who doesn't know you.  It may help to think of an advance directive as a gift to the people who care for you. If you have one, they won't have to make tough decisions by themselves.  For more information, including forms for your state, see the [employer] website (www.caringinfo.org/planning/advance-directives/).  Follow-up care is a key part of your treatment and safety. Be sure to make and go to all appointments, and call your doctor if you are having problems. It's also a good idea to know your test results and keep a list of the medicines you take.  What should you include in an advance directive?  Many states have a unique advance directive form. (It may ask you to address specific issues.) Or you might use a universal form that's approved by many states.  If your form doesn't tell you what to address, it may be hard to know what to include in your advance directive. Use the questions below to help you get started.  Who do you want to make decisions about your medical care if you are not able to?  What life-support measures do you want if you  "Preceptor attestation:  Vital signs reviewed: BP (!) 148/88   Pulse 99   Temp 98.3  F (36.8  C) (Oral)   Resp 20   Ht 1.778 m (5' 10\")   Wt 59.4 kg (131 lb)   SpO2 100%   BMI 18.80 kg/m      Patient seen, evaluated, and discussed with the resident.  I verified the content of the note, which accurately reflects my assessment of the patient and the plan of care.    Supervising physician: Charisse Smith MD  [facility]  "

## 2024-10-17 ENCOUNTER — OFFICE VISIT (OUTPATIENT)
Dept: CARDIOLOGY CLINIC | Age: 74
End: 2024-10-17
Payer: MEDICARE

## 2024-10-17 VITALS
HEIGHT: 66 IN | SYSTOLIC BLOOD PRESSURE: 136 MMHG | WEIGHT: 205 LBS | DIASTOLIC BLOOD PRESSURE: 88 MMHG | BODY MASS INDEX: 32.95 KG/M2 | HEART RATE: 65 BPM | RESPIRATION RATE: 17 BRPM

## 2024-10-17 DIAGNOSIS — I38 VALVULAR HEART DISEASE: ICD-10-CM

## 2024-10-17 DIAGNOSIS — I71.21 ANEURYSM OF ASCENDING AORTA WITHOUT RUPTURE (HCC): Primary | ICD-10-CM

## 2024-10-17 DIAGNOSIS — I48.0 PAROXYSMAL ATRIAL FIBRILLATION (HCC): ICD-10-CM

## 2024-10-17 DIAGNOSIS — I25.10 CORONARY ARTERY DISEASE INVOLVING NATIVE CORONARY ARTERY OF NATIVE HEART WITHOUT ANGINA PECTORIS: ICD-10-CM

## 2024-10-17 PROBLEM — D68.69 SECONDARY HYPERCOAGULABLE STATE (HCC): Status: RESOLVED | Noted: 2024-05-21 | Resolved: 2024-10-17

## 2024-10-17 PROCEDURE — 99214 OFFICE O/P EST MOD 30 MIN: CPT | Performed by: INTERNAL MEDICINE

## 2024-10-17 PROCEDURE — 3075F SYST BP GE 130 - 139MM HG: CPT | Performed by: INTERNAL MEDICINE

## 2024-10-17 PROCEDURE — 1123F ACP DISCUSS/DSCN MKR DOCD: CPT | Performed by: INTERNAL MEDICINE

## 2024-10-17 PROCEDURE — 93000 ELECTROCARDIOGRAM COMPLETE: CPT | Performed by: INTERNAL MEDICINE

## 2024-10-17 PROCEDURE — 3079F DIAST BP 80-89 MM HG: CPT | Performed by: INTERNAL MEDICINE

## 2024-10-17 RX ORDER — CHLORTHALIDONE 25 MG/1
25 TABLET ORAL DAILY
Qty: 30 TABLET | Refills: 3 | Status: SHIPPED | OUTPATIENT
Start: 2024-10-17

## 2024-10-17 NOTE — PROGRESS NOTES
Chief Complaint   Patient presents with    Coronary Artery Disease    Valvular Heart Disease    Atrial Fibrillation       Patient Active Problem List    Diagnosis Date Noted    Coronary artery disease involving native coronary artery of native heart without angina pectoris 11/29/2023     Overview Note:     A. ETT-N 3/2022: mild anteroapical ischemia      Thrombocytopenia, unspecified 05/16/2023    NSVT (nonsustained ventricular tachycardia) (HCC) 03/29/2022     Overview Note:     A. ETT-N 3/28/2022: 5 METS, small anteroapical redistribting defect  B. TTE 2/25/2022: EF 50%. LVH      Gastrointestinal hemorrhage associated with chronic gastritis 03/29/2022    Anemia 02/23/2022    Valvular heart disease 01/02/2020     Overview Note:     A. Echo 11/7/2019: mild to moderate MR, mild AI, EF 55%  B. Echo 2022: same  C. Echo 7/2024: moderate to severe MR, posterior jet, normal LV      Thoracic ascending aortic aneurysm (HCC) 01/02/2020     Overview Note:     A. Echo 11/2019: 4.3 cm   B. CT scan 7/2020: 4.3 cm   C. CT 8/2021: 4.3 cm  D. TTE 2024: 4.2 cm      Paroxysmal atrial fibrillation (HCC) 05/19/2018     Overview Note:     A. CHADS-VASC = 3  (age, HTN, presumed CAD)  B .  DORINDA guided DC cardioversion 5/30/2018      Hypertension 07/23/2015    Hyperlipidemia 07/23/2015       Current Outpatient Medications   Medication Sig Dispense Refill    chlorthalidone (HYGROTON) 25 MG tablet Take 1 tablet by mouth daily 30 tablet 3    ACETAMINOPHEN ER PO Take by mouth      atorvastatin (LIPITOR) 40 MG tablet TAKE 1 TABLET BY MOUTH DAILY 100 tablet 2    ELIQUIS 5 MG TABS tablet TAKE 1 TABLET BY MOUTH TWICE  DAILY 200 tablet 2    metoprolol tartrate (LOPRESSOR) 50 MG tablet TAKE 2 TABLETS BY MOUTH IN THE  MORNING AND 1 TABLET BY MOUTH AT NIGHT (Patient taking differently: Take 2 tablets by mouth 2 times daily) 240 tablet 3    pantoprazole (PROTONIX) 40 MG tablet TAKE 1 TABLET BY MOUTH TWICE  DAILY BEFORE MEALS 200 tablet 2    ramipril

## 2024-11-05 RX ORDER — RAMIPRIL 10 MG/1
10 CAPSULE ORAL 2 TIMES DAILY
Qty: 180 CAPSULE | Refills: 3 | Status: SHIPPED | OUTPATIENT
Start: 2024-11-05

## 2024-11-13 ENCOUNTER — OFFICE VISIT (OUTPATIENT)
Dept: PRIMARY CARE CLINIC | Age: 74
End: 2024-11-13

## 2024-11-13 VITALS
TEMPERATURE: 97.1 F | HEIGHT: 66 IN | SYSTOLIC BLOOD PRESSURE: 128 MMHG | OXYGEN SATURATION: 97 % | DIASTOLIC BLOOD PRESSURE: 66 MMHG | BODY MASS INDEX: 33.01 KG/M2 | WEIGHT: 205.4 LBS | HEART RATE: 65 BPM

## 2024-11-13 DIAGNOSIS — I10 ESSENTIAL HYPERTENSION: ICD-10-CM

## 2024-11-13 DIAGNOSIS — I10 ESSENTIAL HYPERTENSION: Primary | ICD-10-CM

## 2024-11-13 LAB
ANION GAP SERPL CALCULATED.3IONS-SCNC: 7 MMOL/L (ref 7–16)
BUN BLDV-MCNC: 22 MG/DL (ref 6–23)
CALCIUM SERPL-MCNC: 9.2 MG/DL (ref 8.6–10.2)
CHLORIDE BLD-SCNC: 102 MMOL/L (ref 98–107)
CO2: 28 MMOL/L (ref 22–29)
CREAT SERPL-MCNC: 1.3 MG/DL (ref 0.7–1.2)
GFR, ESTIMATED: 57 ML/MIN/1.73M2
GLUCOSE BLD-MCNC: 84 MG/DL (ref 74–99)
MAGNESIUM: 2.1 MG/DL (ref 1.6–2.6)
POTASSIUM SERPL-SCNC: 5 MMOL/L (ref 3.5–5)
SODIUM BLD-SCNC: 137 MMOL/L (ref 132–146)

## 2024-11-13 ASSESSMENT — ENCOUNTER SYMPTOMS
COUGH: 0
APNEA: 0
BLOOD IN STOOL: 0
SINUS PRESSURE: 0
ABDOMINAL PAIN: 0
PHOTOPHOBIA: 0
SHORTNESS OF BREATH: 0
COLOR CHANGE: 0
DIARRHEA: 0
FACIAL SWELLING: 0
WHEEZING: 0
CHEST TIGHTNESS: 0
NAUSEA: 0
BACK PAIN: 0
SORE THROAT: 0
VOMITING: 0
ALLERGIC/IMMUNOLOGIC NEGATIVE: 1

## 2024-11-13 NOTE — PROGRESS NOTES
Chief Complaint:   Chief Complaint   Patient presents with    1 Month Follow-Up     Follow up from cardiology.        Hypertension  This is a chronic problem. The current episode started more than 1 year ago. The problem has been resolved since onset. The problem is controlled. Pertinent negatives include no chest pain, headaches, palpitations or shortness of breath. Past treatments include ACE inhibitors, beta blockers and diuretics. The current treatment provides significant improvement.       Patient Active Problem List   Diagnosis    Hypertension    Hyperlipidemia    Paroxysmal atrial fibrillation (HCC)    Valvular heart disease    Thoracic ascending aortic aneurysm (HCC)    Anemia    NSVT (nonsustained ventricular tachycardia) (HCC)    Gastrointestinal hemorrhage associated with chronic gastritis    Thrombocytopenia, unspecified    Coronary artery disease involving native coronary artery of native heart without angina pectoris       Past Medical History:   Diagnosis Date    Allergic rhinitis 1960    Anticoagulant long-term use 2019    COVID 10/2023    GERD (gastroesophageal reflux disease) 2018    Hyperlipidemia     Hypertension     Obesity 1990    Osteoarthritis 1990       Past Surgical History:   Procedure Laterality Date    CARDIOVERSION  05/30/2018    EYE SURGERY Left 05/2018    cataracts, retinal tear repair.     HERNIA REPAIR      KIDNEY REMOVAL Right     Half of kidney removed.     KIDNEY SURGERY      LITHOTRIPSY      [place] LITHOTRIPSY XTRCORP SHOCK WAVE Right 10/25/2018    ESWL EXTRACORPEAL SHOCK WAVE LITHOTRIPSY WITH CYSTO AND RIGHT STENT PLACEMENT performed by Jose Luis Miller MD at [facility]    TRANSESOPHAGEAL ECHOCARDIOGRAM  05/30/2018    UPPER GASTROINTESTINAL ENDOSCOPY N/A 2/25/2022    EGD BIOPSY performed by Roland Cook MD at [facility] ENDOSCOPY       Current Outpatient Medications   Medication Sig Dispense Refill    ramipril (ALTACE) 10 MG capsule TAKE 1 CAPSULE BY MOUTH TWICE A  capsule 3

## 2024-11-14 ENCOUNTER — TELEPHONE (OUTPATIENT)
Dept: OTHER | Age: 74
End: 2024-11-14

## 2024-11-14 DIAGNOSIS — Z59.86 PATIENT CANNOT AFFORD MEDICATIONS: Primary | ICD-10-CM

## 2024-11-14 DIAGNOSIS — Z13.9 ENCOUNTER FOR SCREENING INVOLVING SOCIAL DETERMINANTS OF HEALTH (SDOH): ICD-10-CM

## 2024-11-14 SDOH — ECONOMIC STABILITY - INCOME SECURITY: FINANCIAL INSECURITY: Z59.86

## 2024-11-14 NOTE — TELEPHONE ENCOUNTER
[facility] Initial Call  11/14/2024  9:42 AM    Patient need: prescription assistance  Notes: CHW called patient to assist with prescription coverage. I told pt about the PAP and that I can send a referral to [facility]. I also told pt to call as well since they have been so busy with other applications . I gave pt their number to call. I also called his pharmacy and gave them a 30 day free trial offer for Eliquis. I told pt if that runs out he can call his cardiologist office to get more samples for Eliquis.          Patient in agreement with plan and further assistance.  [x] Agreed    [] Declined      CHW informed patient of the services and resources that can be provided to them. CHW instructed patient to call [facility] at [phone] for any future assistance.     Electronically signed by Jody Sequeira on 11/14/2024 at 9:42 AM

## 2024-12-23 NOTE — ED PROVIDER NOTES
TABLET BY MOUTH TWICE  DAILY    METOPROLOL TARTRATE (LOPRESSOR) 50 MG TABLET    TAKE 2 TABLETS BY MOUTH IN THE  MORNING AND 1 TABLET BY MOUTH AT NIGHT    PANTOPRAZOLE (PROTONIX) 40 MG TABLET    TAKE 1 TABLET BY MOUTH TWICE  DAILY BEFORE MEALS    RAMIPRIL (ALTACE) 10 MG CAPSULE    TAKE 1 CAPSULE BY MOUTH TWICE A DAY       ALLERGIES     Pcn [penicillins]    FAMILYHISTORY       Family History   Problem Relation Age of Onset    High Blood Pressure Father     Diabetes Brother         SOCIAL HISTORY       Social History     Tobacco Use    Smoking status: Former     Current packs/day: 0.00     Average packs/day: 0.3 packs/day for 12.0 years (3.0 ttl pk-yrs)     Types: Cigarettes     Start date: 1966     Quit date: 1978     Years since quittin.6    Smokeless tobacco: Never    Tobacco comments:     gave up smokes 50 years ago......never inhaled   Vaping Use    Vaping status: Never Used   Substance Use Topics    Alcohol use: Not Currently     Comment: occasionally    Drug use: No       SCREENINGS        [place] Coma Scale  Eye Opening: None  Best Verbal Response: None  Best Motor Response: None  [place] Coma Scale Score: 3  OPO Notified: Not yet                            PHYSICAL EXAM  1 or more Elements     ED Triage Vitals   BP Systolic BP Percentile Diastolic BP Percentile Temp Temp src Pulse Resp SpO2   -- -- -- -- -- -- -- --      Height Weight         -- --                 Constitutional/General: Intubated.  Head: Normocephalic and atraumatic  Eyes: Pupils fixed.  ENT: Intubated  Neck: Supple  Respiratory: Intubated.  Lungs clear to auscultation bilaterally  Cardiovascular: Asystole.  GI:  Abdomen Soft, Non distended  Musculoskeletal: Extremities are atraumatic.  Cool  Integument: Cool and dry.  Neurologic: GCS 3T        DIAGNOSTIC RESULTS   LABS:    Labs Reviewed - No data to display    As interpreted by me, the above displayed labs are abnormal. All other labs obtained during this visit were within 
hour(s)).              Medical Decision Making  Arrives in cardiac arrest. Down for over an hour prior to arrival. ETT confirmed, CPR continued, ACLS protocol continued. Multiple rounds of ACLS without ROSC. Code called. TOD 70    Problems Addressed:  Cardiac arrest: acute illness or injury    Risk  Prescription drug management.                  CONSULTS:   IP CONSULT TO PRIMARY CARE PROVIDER            PROCEDURES   Unless otherwise noted below, none      CRITICAL CARE TIME (.cct)   None did not achieve ROSC      Jag VIVAR MD, am the primary provider of record      FINAL IMPRESSION      1. Cardiac arrest          DISPOSITION/PLAN     DISPOSITION  2024 07:14:08 AM      PATIENT REFERRED TO:  No follow-up provider specified.    DISCHARGE MEDICATIONS:  Discharge Medication List as of 2024  5:04 PM          DISCONTINUED MEDICATIONS:  Discharge Medication List as of 2024  5:04 PM                 (Please note that portions of this note were completed with a voice recognition program.  Efforts were made to edit the dictations but occasionally words are mis-transcribed.)    Jag Calvillo MD (electronically signed)            Jag Calvillo MD  [phone]

## 2024-12-23 NOTE — ED TRIAGE NOTES
Family found around 5am- intubated by ems- 5 rounds of epi, 1 bicarb prior to arrival- et tube 7.0

## 2024-12-23 NOTE — ED NOTES
Per Dangelo Benitez, patient family accepted. [facility] to collect patient, today

## 2025-02-07 RX ORDER — CHLORTHALIDONE 25 MG/1
25 TABLET ORAL DAILY
Qty: 90 TABLET | Refills: 1 | OUTPATIENT
Start: 2025-02-07

## (undated) DEVICE — GARMENT COMPR STD FOR 17IN CALF UNIF THER FLOTRN

## (undated) DEVICE — BLOCK BITE 60FR RUBBER ADLT DENTAL

## (undated) DEVICE — SOLUTION IV IRRIG WATER 1000ML POUR BRL 2F7114

## (undated) DEVICE — BAG DRAINAGE CONTAINER 15 LT FLUID COLLCTN

## (undated) DEVICE — GUIDEWIRE ENDOSCP L150CM DIA0.035IN TIP 3CM PTFE NIT

## (undated) DEVICE — CATHETER URET 5FR L70CM OPN END SGL LUMN INJ HUB FLEXIMA

## (undated) DEVICE — GUIDEWIRE ENDOSCP L150CM DIA0.035IN TIP L15CM BENT PTFE

## (undated) DEVICE — CAMERA STRYKER 1488 HD GEN

## (undated) DEVICE — CYSTO PACK: Brand: MEDLINE INDUSTRIES, INC.

## (undated) DEVICE — GRADUATE TRIANG MEASURE 1000ML BLK PRNT

## (undated) DEVICE — SPECIMEN CUP W/LID: Brand: DEROYAL

## (undated) DEVICE — TRAY,VAG PREP,2PR VNYL GLV,4 C: Brand: MEDLINE INDUSTRIES, INC.

## (undated) DEVICE — SPONGE GZ W4XL4IN RAYON POLY FILL CVR W/ NONWOVEN FAB

## (undated) DEVICE — FORCEPS BX OVL CUP FEN DISPOSABLE CAP L 160CM RAD JAW 4

## (undated) DEVICE — BAG DRNGE COMB PK

## (undated) DEVICE — Z INACTIVE USE 2635503 SOLUTION IRRIG 3000ML ST H2O USP UROMATIC PLAS CONT